# Patient Record
Sex: MALE | Race: WHITE | NOT HISPANIC OR LATINO | Employment: UNEMPLOYED | URBAN - NONMETROPOLITAN AREA
[De-identification: names, ages, dates, MRNs, and addresses within clinical notes are randomized per-mention and may not be internally consistent; named-entity substitution may affect disease eponyms.]

---

## 2017-03-11 ENCOUNTER — APPOINTMENT (OUTPATIENT)
Dept: GENERAL RADIOLOGY | Facility: HOSPITAL | Age: 62
End: 2017-03-11

## 2017-03-11 ENCOUNTER — HOSPITAL ENCOUNTER (INPATIENT)
Facility: HOSPITAL | Age: 62
LOS: 1 days | Discharge: ANOTHER HOSPITAL | End: 2017-03-15
Attending: EMERGENCY MEDICINE | Admitting: INTERNAL MEDICINE

## 2017-03-11 DIAGNOSIS — J44.1 DECOMPENSATED COPD WITH EXACERBATION (CHRONIC OBSTRUCTIVE PULMONARY DISEASE) (HCC): Primary | ICD-10-CM

## 2017-03-11 DIAGNOSIS — M54.50 CHRONIC LOW BACK PAIN WITHOUT SCIATICA, UNSPECIFIED BACK PAIN LATERALITY: ICD-10-CM

## 2017-03-11 DIAGNOSIS — J18.9 PNEUMONIA DUE TO INFECTIOUS ORGANISM, UNSPECIFIED LATERALITY, UNSPECIFIED PART OF LUNG: ICD-10-CM

## 2017-03-11 DIAGNOSIS — R13.13 DYSPHAGIA, PHARYNGEAL PHASE: ICD-10-CM

## 2017-03-11 DIAGNOSIS — R06.89 HYPERCAPNIA: ICD-10-CM

## 2017-03-11 DIAGNOSIS — G89.29 CHRONIC LOW BACK PAIN WITHOUT SCIATICA, UNSPECIFIED BACK PAIN LATERALITY: ICD-10-CM

## 2017-03-11 DIAGNOSIS — F17.200 SMOKER: ICD-10-CM

## 2017-03-11 DIAGNOSIS — Z74.09 IMPAIRED PHYSICAL MOBILITY: ICD-10-CM

## 2017-03-11 DIAGNOSIS — R09.02 HYPOXIA: ICD-10-CM

## 2017-03-11 PROBLEM — J96.21 ACUTE ON CHRONIC RESPIRATORY FAILURE WITH HYPOXIA AND HYPERCAPNIA (HCC): Chronic | Status: ACTIVE | Noted: 2017-03-11

## 2017-03-11 PROBLEM — J96.22 ACUTE ON CHRONIC RESPIRATORY FAILURE WITH HYPOXIA AND HYPERCAPNIA (HCC): Chronic | Status: ACTIVE | Noted: 2017-03-11

## 2017-03-11 LAB
ALBUMIN SERPL-MCNC: 3 G/DL (ref 3.4–4.8)
ALBUMIN/GLOB SERPL: 0.8 G/DL (ref 1.1–1.8)
ALP SERPL-CCNC: 115 U/L (ref 38–126)
ALT SERPL W P-5'-P-CCNC: 35 U/L (ref 21–72)
ANION GAP SERPL CALCULATED.3IONS-SCNC: 8 MMOL/L (ref 5–15)
ARTERIAL PATENCY WRIST A: ABNORMAL
AST SERPL-CCNC: 31 U/L (ref 17–59)
ATMOSPHERIC PRESS: ABNORMAL MMHG
BASE EXCESS BLDA CALC-SCNC: 7.8 MMOL/L (ref -2.4–2.4)
BASOPHILS # BLD AUTO: 0.01 10*3/MM3 (ref 0–0.2)
BASOPHILS NFR BLD AUTO: 0.1 % (ref 0–2)
BDY SITE: ABNORMAL
BILIRUB SERPL-MCNC: 0.3 MG/DL (ref 0.2–1.3)
BUN BLD-MCNC: 19 MG/DL (ref 7–21)
BUN/CREAT SERPL: 30.6 (ref 7–25)
CA-I BLD-MCNC: 4.5 MG/DL (ref 4.5–4.9)
CALCIUM SPEC-SCNC: 8.2 MG/DL (ref 8.4–10.2)
CHLORIDE SERPL-SCNC: 99 MMOL/L (ref 95–110)
CK MB SERPL-CCNC: 0.32 NG/ML (ref 0–5)
CK SERPL-CCNC: <20 U/L (ref 55–170)
CO2 BLDA-SCNC: 36.5 MMOL/L (ref 23–27)
CO2 SERPL-SCNC: 33 MMOL/L (ref 22–31)
CREAT BLD-MCNC: 0.62 MG/DL (ref 0.7–1.3)
D-LACTATE SERPL-SCNC: 1.5 MMOL/L (ref 0.5–2)
DEPRECATED RDW RBC AUTO: 44.3 FL (ref 35.1–43.9)
EOSINOPHIL # BLD AUTO: 0.07 10*3/MM3 (ref 0–0.7)
EOSINOPHIL NFR BLD AUTO: 0.7 % (ref 0–7)
ERYTHROCYTE [DISTWIDTH] IN BLOOD BY AUTOMATED COUNT: 13.3 % (ref 11.5–14.5)
GFR SERPL CREATININE-BSD FRML MDRD: 133 ML/MIN/1.73 (ref 56–130)
GLOBULIN UR ELPH-MCNC: 3.6 GM/DL (ref 2.3–3.5)
GLUCOSE BLD-MCNC: 128 MG/DL (ref 60–100)
GLUCOSE BLDA-MCNC: 144 MMOL/L
HCO3 BLDA-SCNC: 34.7 MMOL/L (ref 22–26)
HCT VFR BLD AUTO: 35.2 % (ref 39–49)
HCT VFR BLD CALC: 37 % (ref 40–54)
HGB BLD-MCNC: 12.1 G/DL (ref 13.7–17.3)
HGB BLDA-MCNC: 12.7 G/DL (ref 14–18)
HOLD SPECIMEN: NORMAL
HOLD SPECIMEN: NORMAL
IMM GRANULOCYTES # BLD: 0.06 10*3/MM3 (ref 0–0.02)
IMM GRANULOCYTES NFR BLD: 0.6 % (ref 0–0.5)
INR PPP: 1.05 (ref 0.8–1.2)
LYMPHOCYTES # BLD AUTO: 1.75 10*3/MM3 (ref 0.6–4.2)
LYMPHOCYTES NFR BLD AUTO: 18.2 % (ref 10–50)
MCH RBC QN AUTO: 30.7 PG (ref 26.5–34)
MCHC RBC AUTO-ENTMCNC: 34.4 G/DL (ref 31.5–36.3)
MCV RBC AUTO: 89.3 FL (ref 80–98)
MODALITY: ABNORMAL
MONOCYTES # BLD AUTO: 0.88 10*3/MM3 (ref 0–0.9)
MONOCYTES NFR BLD AUTO: 9.2 % (ref 0–12)
NEUTROPHILS # BLD AUTO: 6.84 10*3/MM3 (ref 2–8.6)
NEUTROPHILS NFR BLD AUTO: 71.2 % (ref 37–80)
NT-PROBNP SERPL-MCNC: 249 PG/ML (ref 0–900)
PCO2 BLDA: 59 MM HG (ref 35–45)
PH BLDA: 7.39 PH UNITS (ref 7.35–7.45)
PLATELET # BLD AUTO: 345 10*3/MM3 (ref 150–450)
PMV BLD AUTO: 9 FL (ref 8–12)
PO2 BLDA: 67.8 MM HG (ref 80–105)
POTASSIUM BLD-SCNC: 4.6 MMOL/L (ref 3.5–5.1)
POTASSIUM BLDA-SCNC: 3.92 MMOL/L (ref 3.6–4.9)
PROT SERPL-MCNC: 6.6 G/DL (ref 6.3–8.6)
PROTHROMBIN TIME: 13.7 SECONDS (ref 11.1–15.3)
RBC # BLD AUTO: 3.94 10*6/MM3 (ref 4.37–5.74)
SAO2 % BLDCOA: 93.2 %
SODIUM BLD-SCNC: 140 MMOL/L (ref 137–145)
SODIUM BLDA-SCNC: 138 MMOL/L (ref 138–146)
TROPONIN I SERPL-MCNC: <0.012 NG/ML
WBC NRBC COR # BLD: 9.61 10*3/MM3 (ref 3.2–9.8)
WHOLE BLOOD HOLD SPECIMEN: NORMAL
WHOLE BLOOD HOLD SPECIMEN: NORMAL

## 2017-03-11 PROCEDURE — 93010 ELECTROCARDIOGRAM REPORT: CPT | Performed by: INTERNAL MEDICINE

## 2017-03-11 PROCEDURE — 83605 ASSAY OF LACTIC ACID: CPT | Performed by: EMERGENCY MEDICINE

## 2017-03-11 PROCEDURE — 25010000002 CEFTRIAXONE: Performed by: EMERGENCY MEDICINE

## 2017-03-11 PROCEDURE — 83880 ASSAY OF NATRIURETIC PEPTIDE: CPT | Performed by: EMERGENCY MEDICINE

## 2017-03-11 PROCEDURE — 71010 HC CHEST PA OR AP: CPT

## 2017-03-11 PROCEDURE — G0378 HOSPITAL OBSERVATION PER HR: HCPCS

## 2017-03-11 PROCEDURE — 82550 ASSAY OF CK (CPK): CPT | Performed by: EMERGENCY MEDICINE

## 2017-03-11 PROCEDURE — 85025 COMPLETE CBC W/AUTO DIFF WBC: CPT | Performed by: EMERGENCY MEDICINE

## 2017-03-11 PROCEDURE — 25010000002 METHYLPREDNISOLONE PER 125 MG: Performed by: INTERNAL MEDICINE

## 2017-03-11 PROCEDURE — 36600 WITHDRAWAL OF ARTERIAL BLOOD: CPT

## 2017-03-11 PROCEDURE — 93005 ELECTROCARDIOGRAM TRACING: CPT

## 2017-03-11 PROCEDURE — 82553 CREATINE MB FRACTION: CPT | Performed by: EMERGENCY MEDICINE

## 2017-03-11 PROCEDURE — 84484 ASSAY OF TROPONIN QUANT: CPT | Performed by: EMERGENCY MEDICINE

## 2017-03-11 PROCEDURE — 93005 ELECTROCARDIOGRAM TRACING: CPT | Performed by: EMERGENCY MEDICINE

## 2017-03-11 PROCEDURE — 82803 BLOOD GASES ANY COMBINATION: CPT | Performed by: EMERGENCY MEDICINE

## 2017-03-11 PROCEDURE — 99284 EMERGENCY DEPT VISIT MOD MDM: CPT

## 2017-03-11 PROCEDURE — 85610 PROTHROMBIN TIME: CPT | Performed by: EMERGENCY MEDICINE

## 2017-03-11 PROCEDURE — 80053 COMPREHEN METABOLIC PANEL: CPT | Performed by: EMERGENCY MEDICINE

## 2017-03-11 RX ORDER — ALBUTEROL SULFATE 2.5 MG/3ML
2.5 SOLUTION RESPIRATORY (INHALATION)
Status: DISCONTINUED | OUTPATIENT
Start: 2017-03-12 | End: 2017-03-15 | Stop reason: HOSPADM

## 2017-03-11 RX ORDER — GABAPENTIN 100 MG/1
200 CAPSULE ORAL EVERY 8 HOURS SCHEDULED
Status: DISCONTINUED | OUTPATIENT
Start: 2017-03-12 | End: 2017-03-15 | Stop reason: HOSPADM

## 2017-03-11 RX ORDER — LORAZEPAM 1 MG/1
1 TABLET ORAL EVERY 6 HOURS PRN
Status: DISCONTINUED | OUTPATIENT
Start: 2017-03-11 | End: 2017-03-15 | Stop reason: HOSPADM

## 2017-03-11 RX ORDER — AZITHROMYCIN 250 MG/1
500 TABLET, FILM COATED ORAL ONCE
Status: COMPLETED | OUTPATIENT
Start: 2017-03-12 | End: 2017-03-12

## 2017-03-11 RX ORDER — OXYCODONE AND ACETAMINOPHEN 10; 325 MG/1; MG/1
1 TABLET ORAL EVERY 4 HOURS PRN
Status: DISCONTINUED | OUTPATIENT
Start: 2017-03-11 | End: 2017-03-15 | Stop reason: HOSPADM

## 2017-03-11 RX ORDER — IBUPROFEN 600 MG/1
600 TABLET ORAL ONCE
Status: COMPLETED | OUTPATIENT
Start: 2017-03-11 | End: 2017-03-11

## 2017-03-11 RX ORDER — TRAZODONE HYDROCHLORIDE 50 MG/1
50 TABLET ORAL NIGHTLY
Status: DISCONTINUED | OUTPATIENT
Start: 2017-03-12 | End: 2017-03-15 | Stop reason: HOSPADM

## 2017-03-11 RX ORDER — BUDESONIDE AND FORMOTEROL FUMARATE DIHYDRATE 160; 4.5 UG/1; UG/1
2 AEROSOL RESPIRATORY (INHALATION)
Status: DISCONTINUED | OUTPATIENT
Start: 2017-03-11 | End: 2017-03-15 | Stop reason: HOSPADM

## 2017-03-11 RX ORDER — TRAMADOL HYDROCHLORIDE 50 MG/1
50 TABLET ORAL 2 TIMES DAILY
COMMUNITY
End: 2017-03-15 | Stop reason: HOSPADM

## 2017-03-11 RX ORDER — SODIUM CHLORIDE 0.9 % (FLUSH) 0.9 %
1-10 SYRINGE (ML) INJECTION AS NEEDED
Status: DISCONTINUED | OUTPATIENT
Start: 2017-03-11 | End: 2017-03-15 | Stop reason: HOSPADM

## 2017-03-11 RX ORDER — ASPIRIN 81 MG/1
81 TABLET, CHEWABLE ORAL DAILY
Status: DISCONTINUED | OUTPATIENT
Start: 2017-03-12 | End: 2017-03-15 | Stop reason: HOSPADM

## 2017-03-11 RX ORDER — METHYLPREDNISOLONE SODIUM SUCCINATE 125 MG/2ML
60 INJECTION, POWDER, LYOPHILIZED, FOR SOLUTION INTRAMUSCULAR; INTRAVENOUS EVERY 6 HOURS
Status: DISCONTINUED | OUTPATIENT
Start: 2017-03-11 | End: 2017-03-15 | Stop reason: HOSPADM

## 2017-03-11 RX ORDER — AZITHROMYCIN 250 MG/1
500 TABLET, FILM COATED ORAL ONCE
Status: COMPLETED | OUTPATIENT
Start: 2017-03-11 | End: 2017-03-11

## 2017-03-11 RX ORDER — SODIUM CHLORIDE 0.9 % (FLUSH) 0.9 %
10 SYRINGE (ML) INJECTION AS NEEDED
Status: DISCONTINUED | OUTPATIENT
Start: 2017-03-11 | End: 2017-03-15 | Stop reason: HOSPADM

## 2017-03-11 RX ORDER — IBUPROFEN 400 MG/1
400 TABLET ORAL EVERY 6 HOURS PRN
Status: DISCONTINUED | OUTPATIENT
Start: 2017-03-11 | End: 2017-03-15 | Stop reason: HOSPADM

## 2017-03-11 RX ADMIN — IBUPROFEN 600 MG: 600 TABLET ORAL at 20:41

## 2017-03-11 RX ADMIN — CEFTRIAXONE 1 G: 1 INJECTION, POWDER, FOR SOLUTION INTRAMUSCULAR; INTRAVENOUS at 20:50

## 2017-03-11 RX ADMIN — AZITHROMYCIN 500 MG: 250 TABLET, FILM COATED ORAL at 20:50

## 2017-03-11 RX ADMIN — Medication 10 ML: at 23:48

## 2017-03-11 RX ADMIN — OXYCODONE HYDROCHLORIDE AND ACETAMINOPHEN 1 TABLET: 10; 325 TABLET ORAL at 23:52

## 2017-03-11 RX ADMIN — METHYLPREDNISOLONE SODIUM SUCCINATE 60 MG: 125 INJECTION, POWDER, FOR SOLUTION INTRAMUSCULAR; INTRAVENOUS at 23:42

## 2017-03-12 PROCEDURE — 94640 AIRWAY INHALATION TREATMENT: CPT

## 2017-03-12 PROCEDURE — 25010000002 METHYLPREDNISOLONE PER 125 MG: Performed by: INTERNAL MEDICINE

## 2017-03-12 PROCEDURE — 25010000002 CEFTRIAXONE: Performed by: INTERNAL MEDICINE

## 2017-03-12 PROCEDURE — 92610 EVALUATE SWALLOWING FUNCTION: CPT | Performed by: SPEECH-LANGUAGE PATHOLOGIST

## 2017-03-12 PROCEDURE — 94760 N-INVAS EAR/PLS OXIMETRY 1: CPT

## 2017-03-12 PROCEDURE — G8997 SWALLOW GOAL STATUS: HCPCS | Performed by: SPEECH-LANGUAGE PATHOLOGIST

## 2017-03-12 PROCEDURE — 94799 UNLISTED PULMONARY SVC/PX: CPT

## 2017-03-12 PROCEDURE — G0378 HOSPITAL OBSERVATION PER HR: HCPCS

## 2017-03-12 PROCEDURE — G8996 SWALLOW CURRENT STATUS: HCPCS | Performed by: SPEECH-LANGUAGE PATHOLOGIST

## 2017-03-12 RX ADMIN — BUDESONIDE AND FORMOTEROL FUMARATE DIHYDRATE 2 PUFF: 160; 4.5 AEROSOL RESPIRATORY (INHALATION) at 08:05

## 2017-03-12 RX ADMIN — METHYLPREDNISOLONE SODIUM SUCCINATE 60 MG: 125 INJECTION, POWDER, FOR SOLUTION INTRAMUSCULAR; INTRAVENOUS at 05:12

## 2017-03-12 RX ADMIN — METHYLPREDNISOLONE SODIUM SUCCINATE 60 MG: 125 INJECTION, POWDER, FOR SOLUTION INTRAMUSCULAR; INTRAVENOUS at 10:44

## 2017-03-12 RX ADMIN — ALBUTEROL SULFATE 2.5 MG: 2.5 SOLUTION RESPIRATORY (INHALATION) at 18:56

## 2017-03-12 RX ADMIN — GABAPENTIN 200 MG: 100 CAPSULE ORAL at 21:34

## 2017-03-12 RX ADMIN — BUDESONIDE AND FORMOTEROL FUMARATE DIHYDRATE 2 PUFF: 160; 4.5 AEROSOL RESPIRATORY (INHALATION) at 18:56

## 2017-03-12 RX ADMIN — OXYCODONE HYDROCHLORIDE AND ACETAMINOPHEN 1 TABLET: 10; 325 TABLET ORAL at 17:29

## 2017-03-12 RX ADMIN — GABAPENTIN 200 MG: 100 CAPSULE ORAL at 06:40

## 2017-03-12 RX ADMIN — ALBUTEROL SULFATE 2.5 MG: 2.5 SOLUTION RESPIRATORY (INHALATION) at 12:24

## 2017-03-12 RX ADMIN — BUDESONIDE AND FORMOTEROL FUMARATE DIHYDRATE 2 PUFF: 160; 4.5 AEROSOL RESPIRATORY (INHALATION) at 00:02

## 2017-03-12 RX ADMIN — CEFTRIAXONE 1 G: 1 INJECTION, POWDER, FOR SOLUTION INTRAMUSCULAR; INTRAVENOUS at 21:34

## 2017-03-12 RX ADMIN — OXYCODONE HYDROCHLORIDE AND ACETAMINOPHEN 1 TABLET: 10; 325 TABLET ORAL at 05:13

## 2017-03-12 RX ADMIN — ALBUTEROL SULFATE 2.5 MG: 2.5 SOLUTION RESPIRATORY (INHALATION) at 00:02

## 2017-03-12 RX ADMIN — AZITHROMYCIN 500 MG: 250 TABLET, FILM COATED ORAL at 06:40

## 2017-03-12 RX ADMIN — OXYCODONE HYDROCHLORIDE AND ACETAMINOPHEN 1 TABLET: 10; 325 TABLET ORAL at 09:16

## 2017-03-12 RX ADMIN — OXYCODONE HYDROCHLORIDE AND ACETAMINOPHEN 1 TABLET: 10; 325 TABLET ORAL at 21:35

## 2017-03-12 RX ADMIN — METHYLPREDNISOLONE SODIUM SUCCINATE 60 MG: 125 INJECTION, POWDER, FOR SOLUTION INTRAMUSCULAR; INTRAVENOUS at 22:50

## 2017-03-12 RX ADMIN — OXYCODONE HYDROCHLORIDE AND ACETAMINOPHEN 1 TABLET: 10; 325 TABLET ORAL at 13:30

## 2017-03-12 RX ADMIN — ALBUTEROL SULFATE 2.5 MG: 2.5 SOLUTION RESPIRATORY (INHALATION) at 08:06

## 2017-03-12 RX ADMIN — METHYLPREDNISOLONE SODIUM SUCCINATE 60 MG: 125 INJECTION, POWDER, FOR SOLUTION INTRAMUSCULAR; INTRAVENOUS at 17:30

## 2017-03-12 RX ADMIN — GABAPENTIN 200 MG: 100 CAPSULE ORAL at 13:30

## 2017-03-12 RX ADMIN — Medication 10 ML: at 10:44

## 2017-03-12 RX ADMIN — ASPIRIN 81 MG 81 MG: 81 TABLET ORAL at 09:16

## 2017-03-12 NOTE — PLAN OF CARE
Problem: Fall Risk (Adult)  Goal: Identify Related Risk Factors and Signs and Symptoms  Outcome: Outcome(s) achieved Date Met:  03/12/17

## 2017-03-12 NOTE — CONSULTS
"Adult Nutrition  Assessment    Patient Name:  Héctor Koo  YOB: 1955  MRN: [id number]  Admit Date:  3/11/2017    Assessment Date:  3/12/2017          Reason for Assessment       03/12/17 1619    Reason for Assessment    Reason For Assessment/Visit admission assessment;identified at risk by screening criteria    Identified At Risk By Screening Criteria MST SCORE 2+;BMI                Nutrition/Diet History       03/12/17 1645    Nutrition/Diet History    Typical Food/Fluid Intake Lives alone. Cooks/shops for self (when feels like it). Tired of snack foods.     Supplemental Drinks/Foods/Additives Agreed to Ensure Enlive with meals.              Labs/Tests/Procedures/Meds       03/12/17 1648    Labs/Tests/Procedures/Meds    Diagnostic Test/Procedure Review reviewed, pertinent    Significant Vitals reviewed, pertinent            Physical Findings       03/12/17 1648    Physical Findings/Assessment    Additional Documentation Physical Appearance (Group)    Physical Appearance    Overall Physical Appearance generalized wasting;loss of muscle mass    Oral/Mouth Cavity --   Pt does not appear to have teeth, but states can chew regular texture.            Estimated/Assessed Needs       03/12/17 1650    Calculation Measurements    Weight Used For Calculations 47.6 kg (105 lb)    Height Used for Calculations 1.626 m (5' 4\")    Estimated/Assessed Energy Needs    Energy Need Method [place] Je[place]    Age 61    RMR ([place] Jeor Equation) 1202.28    Activity Factors ([place] Je[place])  Out of bed, ambulatory  1.3    Total estimated needs ([place] Jeor) 1562 for weight main    Estimated Kcal Range  7403-6323 for weight gain    Estimated/Assessed Protein Needs    Weight Used for Protein Calculation 59 kg (130 lb)   used IBW for calculation    Protein (gm/kg) 1.3    1.3 Gm Protein (gm) 76.66    Estimated Protein Range 75-85            Nutrition Prescription Ordered       03/12/17 1653    Nutrition " Prescription PO    Current PO Diet Regular    Supplement Ensure Enlive    Supplement Frequency 3 times a day              Comments:  61WM admitted due to exacerbation COPD/pneu/dysphagia (says is not a problem now). Hx: sciatica (lower back).  Significant unintentional wt loss 25-30 lb. Pt lives alone. Shops and cooks for self (when able). Tired of snack foods.  Pt agreed to Ensure Enlive with meals (raeann) while in hospital. Discussed options for meals/supplements at home.  Income is limited.         Electronically signed by:  Laurie Rosas RD  03/12/17 4:59 PM

## 2017-03-12 NOTE — PLAN OF CARE
Problem: Skin Integrity Impairment, Risk/Actual (Adult)  Goal: Identify Related Risk Factors and Signs and Symptoms  Outcome: Outcome(s) achieved Date Met:  03/12/17

## 2017-03-12 NOTE — PROGRESS NOTES
[facility] Medicine [facility]  INPATIENT PROGRESS NOTE    Length of Stay: 0  Date of Admission: 3/11/2017  Primary Care Physician: Cory Correia MD    Subjective   Subjective    Feels SOA.  Breathing is slowly improving .     Review of Systems   Constitutional: Negative for activity change.   Respiratory: Positive for cough and shortness of breath. Negative for chest tightness.    Gastrointestinal: Negative for diarrhea.        All pertinent negatives and positives are as above. All other systems have been reviewed and are negative unless otherwise stated.     Objective    Temp:  [97.2 °F (36.2 °C)-97.8 °F (36.6 °C)] 97.2 °F (36.2 °C)  Heart Rate:  [74-96] 78  Resp:  [18-25] 18  BP: (118-148)/(59-81) 147/71  Physical Exam   Constitutional: No distress.   HENT:   Head: Normocephalic and atraumatic.   Pulmonary/Chest: Effort normal. He has rales.   Abdominal: Soft. He exhibits no distension. There is no tenderness.   Musculoskeletal: Normal range of motion.   Neurological: He is alert.   Skin: Skin is warm and dry. He is not diaphoretic.   Psychiatric: He has a normal mood and affect. His behavior is normal. Thought content normal.           Results Review:  I have reviewed the labs, radiology results, and diagnostic studies.    Laboratory Data:   Lab Results (last 24 hours)     Procedure Component Value Units Date/Time    Blood Gas, Arterial [[id number]]  (Abnormal) Collected:  03/11/17 1845    Specimen:  Arterial Blood Updated:  03/11/17 1853     Site --       Not performed at this site.        Paul's Test --       Not performed at this site.        pH, Arterial 7.387 pH units      pCO2, Arterial 59.0 (H) mm Hg      pO2, Arterial 67.8 (L) mm Hg      HCO3, Arterial 34.7 (H) mmol/L      Base Excess, Arterial 7.8 (H) mmol/L      O2 Saturation, Arterial 93.2 %      Hemoglobin, Blood Gas 12.7 (L) g/dL      Hematocrit, Blood Gas 37.0 (L) %      CO2 Content 36.5 (H)       Sodium, Arterial 138.0 mmol/L      Potassium, Arterial 3.92 mmol/L      Glucose, Arterial 144 mmol/L      Barometric Pressure for Blood Gas -- mmHg       Not performed at this site.        Modality --       Not performed at this site.        Ionized Calcium 4.5 mg/dL     CBC & Differential [[id number]] Collected:  03/11/17 1845    Specimen:  Blood Updated:  03/11/17 1854    Narrative:       The following orders were created for panel order CBC & Differential.  Procedure                               Abnormality         Status                     ---------                               -----------         ------                     CBC Auto Differential[[id number]]         Abnormal            Final result                 Please view results for these tests on the individual orders.    CBC Auto Differential [[id number]]  (Abnormal) Collected:  03/11/17 1845    Specimen:  Blood Updated:  03/11/17 1854     WBC 9.61 10*3/mm3      RBC 3.94 (L) 10*6/mm3      Hemoglobin 12.1 (L) g/dL      Hematocrit 35.2 (L) %      MCV 89.3 fL      MCH 30.7 pg      MCHC 34.4 g/dL      RDW 13.3 %      RDW-SD 44.3 (H) fl      MPV 9.0 fL      Platelets 345 10*3/mm3      Neutrophil % 71.2 %      Lymphocyte % 18.2 %      Monocyte % 9.2 %      Eosinophil % 0.7 %      Basophil % 0.1 %      Immature Grans % 0.6 (H) %      Neutrophils, Absolute 6.84 10*3/mm3      Lymphocytes, Absolute 1.75 10*3/mm3      Monocytes, Absolute 0.88 10*3/mm3      Eosinophils, Absolute 0.07 10*3/mm3      Basophils, Absolute 0.01 10*3/mm3      Immature Grans, Absolute 0.06 (H) 10*3/mm3     Lactic Acid, Plasma [[id number]]  (Normal) Collected:  03/11/17 1845    Specimen:  Blood Updated:  03/11/17 1902     Lactate 1.5 mmol/L     Comprehensive Metabolic Panel [[id number]]  (Abnormal) Collected:  03/11/17 1845    Specimen:  Blood Updated:  03/11/17 1903     Glucose 128 (H) mg/dL      BUN 19 mg/dL      Creatinine 0.62 (L) mg/dL      Sodium 140 mmol/L      Potassium 4.6 mmol/L       Chloride 99 mmol/L      CO2 33.0 (H) mmol/L      Calcium 8.2 (L) mg/dL      Total Protein 6.6 g/dL      Albumin 3.00 (L) g/dL      ALT (SGPT) 35 U/L      AST (SGOT) 31 U/L      Alkaline Phosphatase 115 U/L      Total Bilirubin 0.3 mg/dL      eGFR Non African Amer 133 (H) mL/min/1.73      Globulin 3.6 (H) gm/dL      A/G Ratio 0.8 (L) g/dL      BUN/Creatinine Ratio 30.6 (H)      Anion Gap 8.0 mmol/L     CK [[id number]]  (Abnormal) Collected:  03/11/17 1845    Specimen:  Blood Updated:  03/11/17 1950     Creatine Kinase <20 (L) U/L     Troponin [[id number]]  (Normal) Collected:  03/11/17 1845    Specimen:  Blood Updated:  03/11/17 2003     Troponin I <0.012 ng/mL     CK-MB [[id number]]  (Normal) Collected:  03/11/17 1845    Specimen:  Blood Updated:  03/11/17 2003     CKMB 0.32 ng/mL     BNP [[id number]]  (Normal) Collected:  03/11/17 1845    Specimen:  Blood Updated:  03/11/17 2003     proBNP 249.0 pg/mL     Protime-INR [[id number]]  (Normal) Collected:  03/11/17 1845    Specimen:  Blood Updated:  03/11/17 2009     Protime 13.7 Seconds      INR 1.05     Narrative:       Therapeutic range for most indications is 2.0-3.0 INR,  or 2.5-3.5 for mechanical heart valves.    [place] Draw [[id number]] Collected:  03/11/17 1845    Specimen:  Blood Updated:  03/11/17 2301    Narrative:       The following orders were created for panel order [place] Draw.  Procedure                               Abnormality         Status                     ---------                               -----------         ------                     Light Blue Top[[id number]]                                    Final result               Green Top (Gel)[[id number]]                                   Final result               Lavender Top[[id number]]                                      Final result               Gold Top - SST[[id number]]                                    Final result                 Please view results for these tests on the individual orders.    Light  Blue Top [[id number]] Collected:  03/11/17 1845    Specimen:  Blood Updated:  03/11/17 2301     Extra Tube hold for add-on       Auto resulted       Green Top (Gel) [[id number]] Collected:  03/11/17 1845    Specimen:  Blood Updated:  03/11/17 2301     Extra Tube Hold for add-ons.       Auto resulted.       Lavender Top [[id number]] Collected:  03/11/17 1845    Specimen:  Blood Updated:  03/11/17 2301     Extra Tube hold for add-on       Auto resulted       Gold Top - SST [[id number]] Collected:  03/11/17 1845    Specimen:  Blood Updated:  03/11/17 2301     Extra Tube Hold for add-ons.       Auto resulted.             Culture Data:   No results found for: BLOODCX  No results found for: URINECX  No results found for: RESPCX  No results found for: WOUNDCX  No results found for: STOOLCX  No components found for: BODYFLD    Radiology Data:   Imaging Results (last 24 hours)     Procedure Component Value Units Date/Time    XR Chest 1 View [[id number]] Collected:  03/11/17 1950     Updated:  03/11/17 1955    Narrative:       Patient Name:  ORAL FRYE  Patient ID:  [id number]   Ordering:  SHY SMITH  Attending:  SHY SMITH  Referring:  SHY SMITH  ------------------------------------------------  PROCEDURE: XR CHEST 1 VIEW    INDICATION FOR PROCEDURE:  61 years -old patient presents for  evaluation of midsternal chest pain    COMPARISON:  December 12, 2015.    FINDINGS: XR CHEST 1 view  reveals the lungs are expanded. There  is heterogeneous airspace consolidation and atelectasis. There is  left apical pleural thickening, similar to previous study. There  may also be related apical pleural thickening. There is blunting  of left lateral costophrenic angle suggesting a small pleural  effusion or pleural thickening.    There is no radiographic evidence for pneumothorax. Mediastinal  and cardiac silhouettes are within normal limits.     The bones are osteopenic. The skeletal structures are within  normal limits.      A large amount of bowel gas is visualized. A nonspecific lucency  is visible in the right upper quadrant that may represent bowel  gas.      Impression:         1.  Probable bilateral airspace disease. This likely represents  pneumonia.   2.  Patient has extensive sequela of pleural thickening and  pulmonary fibrosis.    Electronically signed by:  Jacquelyn Melendrez MD  3/11/2017 7:54 PM CST  Workstation: [employer]          I have reviewed the patient current medications.     Assessment/Plan     Hospital Problem List     * (Principal)Acute on chronic respiratory failure with hypoxia and hypercapnia (Chronic)    Decompensated COPD with exacerbation (chronic obstructive pulmonary disease)    Chronic pain (Chronic)    Pneumonia          Plan:    COPD exacerbation - solumedrol  PNA - rocephin/azithromycin  Deconditioning - PTOT              Discharge Planning: I expect patient to be discharged to home in 1-2 days.    Chris Marie MD   03/12/17   3:43 PM

## 2017-03-12 NOTE — PLAN OF CARE
Problem: Patient Care Overview (Adult)  Goal: Plan of Care Review  Outcome: Ongoing (interventions implemented as appropriate)    03/12/17 0907   Coping/Psychosocial Response Interventions   Plan Of Care Reviewed With patient   Outcome Evaluation   Outcome Summary/Follow up Plan Evaluation completed; 1-2 follow up sessions for diet tolerance.          Problem: Inpatient SLP  Goal: Dysphagia- Patient will safely consume diet as per recommendation with no signs/symptoms of aspiration  Outcome: Ongoing (interventions implemented as appropriate)    03/12/17 0907   Safely Consume Diet   Safely Consume Diet- SLP, Date Established 03/12/17   Safely Consume Diet- SLP, Time to Achieve 2 - 3 days   Safely Consume Diet- SLP, Additional Goal Pt to tolerate least restrictive diet with no s/s of aspiration for adequate nutrition/hydration.

## 2017-03-12 NOTE — PROGRESS NOTES
Acute Care - Speech Language Pathology Initial Evaluation  [facility]     Patient Name: Héctor Koo  : 1955  MRN: [id number]  Today's Date: 3/12/2017               Admit Date: 3/11/2017    Evaluation completed; 1-2 follow up sessions needed to ensure swallow safety and efficiency. Pt with mild cough with straw however when straw removed no s/s of aspiration noted.        Visit Dx:    ICD-10-CM ICD-9-CM   1. Decompensated COPD with exacerbation (chronic obstructive pulmonary disease) J44.1 491.21   2. Pneumonia due to infectious organism, unspecified laterality, unspecified part of lung J18.9 136.9     484.8   3. Hypoxia R09.02 799.02   4. Hypercapnia R06.89 786.09   5. Chronic low back pain without sciatica, unspecified back pain laterality M54.5 724.2    G89.29 338.29   6. Smoker F17.200 305.1   7. Dysphagia, pharyngeal phase R13.13 787.23     Patient Active Problem List   Diagnosis   • Decompensated COPD with exacerbation (chronic obstructive pulmonary disease)   • Chronic pain   • Acute on chronic respiratory failure with hypoxia and hypercapnia   • Pneumonia     Past Medical History   Diagnosis Date   • Chronic back pain    • Chronic hepatitis      B and C   • Chronic obstructive lung disease    • Chronic pain    • Drug dependence    • Nondependent alcohol abuse, in remission    • Pneumonia      Past Surgical History   Procedure Laterality Date   • Diaphragmatic hernia repair     • Hip surgery  2015   • Liver biopsy     • Joint replacement              EDUCATION  The patient has been educated in the following areas:   Dysphagia (Swallowing Impairment).    SLP Recommendation and Plan              Anticipated Discharge Disposition: home with home health                Plan of Care Review  Plan Of Care Reviewed With: patient  Outcome Summary/Follow up Plan: Evaluation completed; 1-2 follow up sessions for diet tolerance.           IP SLP Goals       17 0907          Safely Consume  Diet    Safely Consume Diet- SLP, Date Established 03/12/17  -EK      Safely Consume Diet- SLP, Time to Achieve 2 - 3 days  -EK      Safely Consume Diet- SLP, Additional Goal Pt to tolerate least restrictive diet with no s/s of aspiration for adequate nutrition/hydration.   -EK        User Key  (r) = Recorded By, (t) = Taken By, (c) = Cosigned By    Initials Name Provider Type    SHAHZAD Jung Speech and Language Pathologist              Time Calculation:         Time Calculation- SLP       03/12/17 0909          Time Calculation- SLP    SLP Start Time 0840  -EK      SLP Stop Time 0910  -EK      SLP Time Calculation (min) 30 min  -EK      SLP Received On 03/12/17  -EK      SLP Goal Re-Cert Due Date 03/25/17  -EK        User Key  (r) = Recorded By, (t) = Taken By, (c) = Cosigned By    Initials Name Provider Type    SHAHZAD Jung Speech and Language Pathologist          Therapy Charges for Today     Code Description Service Date Service Provider Modifiers Qty    74723670813 HC ST SWALLOWING CURRENT STATUS 3/12/2017 SHAHZAD Haq GN, CI 1    72914966136 HC ST SWALLOWING PROJECTED 3/12/2017 SHAHZAD Haq GN, CI 1    53905005018 HC ST EVAL ORAL PHARYNG SWALLOW 2 3/12/2017 SHAHZAD Haq GN 1              SLP G-Codes  Functional Limitations: Swallowing  Swallow Current Status (): At least 1 percent but less than 20 percent impaired, limited or restricted  Swallow Goal Status (): At least 1 percent but less than 20 percent impaired, limited or restricted      SHAHZAD Haq  3/12/2017

## 2017-03-12 NOTE — PLAN OF CARE
Problem: Patient Care Overview (Adult)  Goal: Plan of Care Review  Outcome: Ongoing (interventions implemented as appropriate)    03/12/17 1629   Coping/Psychosocial Response Interventions   Plan Of Care Reviewed With patient   Patient Care Overview   Progress no change   Outcome Evaluation   Outcome Summary/Follow up Plan patient weak, appetite good, vitals stable       Goal: Adult Individualization and Mutuality  Outcome: Ongoing (interventions implemented as appropriate)  Goal: Discharge Needs Assessment  Outcome: Ongoing (interventions implemented as appropriate)    Problem: Pneumonia (Adult)  Goal: Signs and Symptoms of Listed Potential Problems Will be Absent or Manageable (Pneumonia)  Outcome: Ongoing (interventions implemented as appropriate)    Problem: Infection, Risk/Actual (Adult)  Goal: Infection Prevention/Resolution  Outcome: Ongoing (interventions implemented as appropriate)    Problem: Skin Integrity Impairment, Risk/Actual (Adult)  Goal: Skin Integrity/Wound Healing  Outcome: Ongoing (interventions implemented as appropriate)    Problem: Fall Risk (Adult)  Goal: Absence of Falls  Outcome: Ongoing (interventions implemented as appropriate)

## 2017-03-12 NOTE — PLAN OF CARE
Problem: Patient Care Overview (Adult)  Goal: Plan of Care Review  Outcome: Ongoing (interventions implemented as appropriate)    03/12/17 0443   Coping/Psychosocial Response Interventions   Plan Of Care Reviewed With patient   Patient Care Overview   Progress no change   Outcome Evaluation   Outcome Summary/Follow up Plan no falls; educated pt on importance of turning to prevent skin breakdown; pt afebrile;        Goal: Adult Individualization and Mutuality  Outcome: Ongoing (interventions implemented as appropriate)  Goal: Discharge Needs Assessment  Outcome: Ongoing (interventions implemented as appropriate)    Problem: Pneumonia (Adult)  Goal: Signs and Symptoms of Listed Potential Problems Will be Absent or Manageable (Pneumonia)  Outcome: Ongoing (interventions implemented as appropriate)    Problem: Infection, Risk/Actual (Adult)  Goal: Identify Related Risk Factors and Signs and Symptoms  Outcome: Outcome(s) achieved Date Met:  03/12/17  Goal: Infection Prevention/Resolution  Outcome: Ongoing (interventions implemented as appropriate)    Problem: Skin Integrity Impairment, Risk/Actual (Adult)  Goal: Skin Integrity/Wound Healing  Outcome: Ongoing (interventions implemented as appropriate)  Educated pt on importance of turning to prevent skin breakdown     Problem: Fall Risk (Adult)  Goal: Absence of Falls  Outcome: Ongoing (interventions implemented as appropriate)

## 2017-03-12 NOTE — H&P
History and Physical  Jaya Harrison MD  Hospitalist      Patient Care Team:  Cory Correia MD as PCP - General    Chief complaint   Chief Complaint   Patient presents with   • Shortness of Breath       Subjective     Patient is a 61 y.o. male admitted for dyspnea, cough, wheezing. His symptoms are chronic, became worse lately.      History  Past Medical History   Diagnosis Date   • Chronic back pain    • Chronic hepatitis      B and C   • Chronic obstructive lung disease    • Chronic pain    • Drug dependence    • Nondependent alcohol abuse, in remission    • Pneumonia      Past Surgical History   Procedure Laterality Date   • Diaphragmatic hernia repair     • Hip surgery  12/14/2015   • Liver biopsy       Family History   Problem Relation Age of Onset   • Diabetes Neg Hx    • Hypertension Neg Hx    • Coronary artery disease Neg Hx      Social History   Substance Use Topics   • Smoking status: Current Every Day Smoker     Packs/day: 0.50   • Smokeless tobacco: None   • Alcohol use No      Comment: quit 10 years ago, prev alcoholic       (Not in a hospital admission)  Allergies:  Review of patient's allergies indicates no known allergies.    Review of Systems  Review of Systems   Constitutional: Negative for chills and fever.   Respiratory: Positive for cough, shortness of breath and wheezing. Negative for stridor.    Cardiovascular: Negative for chest pain, palpitations and leg swelling.   Gastrointestinal: Negative for abdominal distention, constipation and diarrhea.   Genitourinary: Negative for dysuria, frequency and urgency.   Musculoskeletal: Positive for arthralgias, back pain and neck pain.   Neurological: Positive for weakness and light-headedness. Negative for numbness.   Psychiatric/Behavioral: Negative for agitation and behavioral problems.   All other systems reviewed and are negative.      Objective     Vital Signs  Temp:  [97.7 °F (36.5 °C)] 97.7 °F (36.5 °C)  Heart Rate:  [74-92] 74  Resp:   [20-25] 20  BP: (118-148)/(59-81) 136/81    Physical Exam:  Physical Exam   Constitutional: He is oriented to person, place, and time. He appears cachectic. He has a sickly appearance.   HENT:   Head: Normocephalic.   Eyes: EOM are normal.   Neck: Neck supple.   Cardiovascular: Normal rate and regular rhythm.    Pulmonary/Chest: He is in respiratory distress. He has wheezes. He has rales.   Abdominal: Soft. Bowel sounds are normal. He exhibits no distension and no mass. There is no tenderness. There is no guarding.   Musculoskeletal: Normal range of motion. He exhibits no edema.   Neurological: He is alert and oriented to person, place, and time. No cranial nerve deficit.   Skin: Skin is dry. No erythema.   Psychiatric: He has a normal mood and affect. His behavior is normal.   Vitals reviewed.      Results Review:   Lab Results (last 24 hours)     Procedure Component Value Units Date/Time    [place] Draw [[id number]] Collected:  03/11/17 1845    Specimen:  Blood Updated:  03/11/17 1850    Narrative:       The following orders were created for panel order [place] Draw.  Procedure                               Abnormality         Status                     ---------                               -----------         ------                     Light Blue Top[[id number]]                                    In process                 Green Top (Gel)[[id number]]                                   In process                 Lavender Top[[id number]]                                      In process                 Gold Top - SST[[id number]]                                    In process                   Please view results for these tests on the individual orders.    Light Blue Top [[id number]] Collected:  03/11/17 1845    Specimen:  Blood Updated:  03/11/17 1850    Green Top (Gel) [[id number]] Collected:  03/11/17 1845    Specimen:  Blood Updated:  03/11/17 1850    Gold Top - SST [[id number]] Collected:  03/11/17 1845    Specimen:  Blood  Updated:  03/11/17 1850    Lavender Top [[id number]] Collected:  03/11/17 1845    Specimen:  Blood Updated:  03/11/17 1850    Blood Gas, Arterial [[id number]]  (Abnormal) Collected:  03/11/17 1845    Specimen:  Arterial Blood Updated:  03/11/17 1853     Site --       Not performed at this site.        Paul's Test --       Not performed at this site.        pH, Arterial 7.387 pH units      pCO2, Arterial 59.0 (H) mm Hg      pO2, Arterial 67.8 (L) mm Hg      HCO3, Arterial 34.7 (H) mmol/L      Base Excess, Arterial 7.8 (H) mmol/L      O2 Saturation, Arterial 93.2 %      Hemoglobin, Blood Gas 12.7 (L) g/dL      Hematocrit, Blood Gas 37.0 (L) %      CO2 Content 36.5 (H)      Sodium, Arterial 138.0 mmol/L      Potassium, Arterial 3.92 mmol/L      Glucose, Arterial 144 mmol/L      Barometric Pressure for Blood Gas -- mmHg       Not performed at this site.        Modality --       Not performed at this site.        Ionized Calcium 4.5 mg/dL     CBC & Differential [[id number]] Collected:  03/11/17 1845    Specimen:  Blood Updated:  03/11/17 1854    Narrative:       The following orders were created for panel order CBC & Differential.  Procedure                               Abnormality         Status                     ---------                               -----------         ------                     CBC Auto Differential[[id number]]         Abnormal            Final result                 Please view results for these tests on the individual orders.    CBC Auto Differential [[id number]]  (Abnormal) Collected:  03/11/17 1845    Specimen:  Blood Updated:  03/11/17 1854     WBC 9.61 10*3/mm3      RBC 3.94 (L) 10*6/mm3      Hemoglobin 12.1 (L) g/dL      Hematocrit 35.2 (L) %      MCV 89.3 fL      MCH 30.7 pg      MCHC 34.4 g/dL      RDW 13.3 %      RDW-SD 44.3 (H) fl      MPV 9.0 fL      Platelets 345 10*3/mm3      Neutrophil % 71.2 %      Lymphocyte % 18.2 %      Monocyte % 9.2 %      Eosinophil % 0.7 %      Basophil % 0.1 %       Immature Grans % 0.6 (H) %      Neutrophils, Absolute 6.84 10*3/mm3      Lymphocytes, Absolute 1.75 10*3/mm3      Monocytes, Absolute 0.88 10*3/mm3      Eosinophils, Absolute 0.07 10*3/mm3      Basophils, Absolute 0.01 10*3/mm3      Immature Grans, Absolute 0.06 (H) 10*3/mm3     Lactic Acid, Plasma [[id number]]  (Normal) Collected:  03/11/17 1845    Specimen:  Blood Updated:  03/11/17 1902     Lactate 1.5 mmol/L     Comprehensive Metabolic Panel [[id number]]  (Abnormal) Collected:  03/11/17 1845    Specimen:  Blood Updated:  03/11/17 1903     Glucose 128 (H) mg/dL      BUN 19 mg/dL      Creatinine 0.62 (L) mg/dL      Sodium 140 mmol/L      Potassium 4.6 mmol/L      Chloride 99 mmol/L      CO2 33.0 (H) mmol/L      Calcium 8.2 (L) mg/dL      Total Protein 6.6 g/dL      Albumin 3.00 (L) g/dL      ALT (SGPT) 35 U/L      AST (SGOT) 31 U/L      Alkaline Phosphatase 115 U/L      Total Bilirubin 0.3 mg/dL      eGFR Non African Amer 133 (H) mL/min/1.73      Globulin 3.6 (H) gm/dL      A/G Ratio 0.8 (L) g/dL      BUN/Creatinine Ratio 30.6 (H)      Anion Gap 8.0 mmol/L     CK [[id number]]  (Abnormal) Collected:  03/11/17 1845    Specimen:  Blood Updated:  03/11/17 1950     Creatine Kinase <20 (L) U/L     Troponin [[id number]]  (Normal) Collected:  03/11/17 1845    Specimen:  Blood Updated:  03/11/17 2003     Troponin I <0.012 ng/mL     CK-MB [[id number]]  (Normal) Collected:  03/11/17 1845    Specimen:  Blood Updated:  03/11/17 2003     CKMB 0.32 ng/mL     BNP [[id number]]  (Normal) Collected:  03/11/17 1845    Specimen:  Blood Updated:  03/11/17 2003     proBNP 249.0 pg/mL     Protime-INR [[id number]]  (Normal) Collected:  03/11/17 1845    Specimen:  Blood Updated:  03/11/17 2009     Protime 13.7 Seconds      INR 1.05     Narrative:       Therapeutic range for most indications is 2.0-3.0 INR,  or 2.5-3.5 for mechanical heart valves.          Imaging Results (last 24 hours)     Procedure Component Value Units Date/Time    XR Chest 1 View  [[id number]] Collected:  03/11/17 1950     Updated:  03/11/17 1955    Narrative:       Patient Name:  ORAL FRYE  Patient ID:  [id number]   Ordering:  SHY SMITH  Attending:  SHY SMITH  Referring:  SHY SMITH  ------------------------------------------------  PROCEDURE: XR CHEST 1 VIEW    INDICATION FOR PROCEDURE:  61 years -old patient presents for  evaluation of midsternal chest pain    COMPARISON:  December 12, 2015.    FINDINGS: XR CHEST 1 view  reveals the lungs are expanded. There  is heterogeneous airspace consolidation and atelectasis. There is  left apical pleural thickening, similar to previous study. There  may also be related apical pleural thickening. There is blunting  of left lateral costophrenic angle suggesting a small pleural  effusion or pleural thickening.    There is no radiographic evidence for pneumothorax. Mediastinal  and cardiac silhouettes are within normal limits.     The bones are osteopenic. The skeletal structures are within  normal limits.     A large amount of bowel gas is visualized. A nonspecific lucency  is visible in the right upper quadrant that may represent bowel  gas.      Impression:         1.  Probable bilateral airspace disease. This likely represents  pneumonia.   2.  Patient has extensive sequela of pleural thickening and  pulmonary fibrosis.    Electronically signed by:  Jacquelyn Melendrez MD  3/11/2017 7:54 PM CST  Workstation: [id number]          Assessment/Plan     Principal Problem:    Acute on chronic respiratory failure with hypoxia and hypercapnia  Active Problems:    Decompensated COPD with exacerbation (chronic obstructive pulmonary disease)    Pneumonia    Chronic pain    IV steroids, IV antibiotics, nebulized treatments    Jaya Harrison MD  03/11/17  10:12 PM

## 2017-03-12 NOTE — ED PROVIDER NOTES
Subjective   HPI Comments: 61 years old well-developed but poor nourished male came complaining of shortness of breath.  He stated he lives alone and was brought by EMS.  He was given nebulizer treatment in route to the emergency department    He had a shortness of breath for 2 weeks chronic back problem and abdominal pain occasional for 4 weeks.    Past surgical history: Right hip surgery:, [place] had a [place] on his [place].  Hiatal hernia.    Denies any allergies.    Past medical history: COPD, diabetes, hepatitis, chronic back pain,        Family history: Diabetes, cancer.    Smokes tobacco about a pack a day of cigarettes.        Patient is a 61 y.o. male presenting with shortness of breath.   History provided by:  Patient  Shortness of Breath   Severity:  Moderate  Onset quality:  Gradual  Duration:  2 weeks  Timing:  Intermittent  Progression:  Waxing and waning  Chronicity:  Recurrent  Context: activity, fumes and smoke exposure    Relieved by:  Nothing  Worsened by:  Nothing  Ineffective treatments:  None tried  Associated symptoms: abdominal pain and wheezing    Associated symptoms: no chest pain, no cough, no fever, no headaches, no neck pain, no rash and no sore throat    Risk factors: alcohol use    Risk factors: no recent surgery        Review of Systems   Constitutional: Negative for activity change, appetite change, fatigue and fever.   HENT: Negative for congestion, facial swelling, mouth sores, nosebleeds, sore throat and trouble swallowing.    Eyes: Negative for discharge, redness and itching.   Respiratory: Positive for shortness of breath and wheezing. Negative for apnea and cough.    Cardiovascular: Negative for chest pain and palpitations.   Gastrointestinal: Positive for abdominal pain. Negative for blood in stool and nausea.   Endocrine: Negative for cold intolerance, heat intolerance, polydipsia, polyphagia and polyuria.   Genitourinary: Negative for difficulty urinating, dysuria, flank pain,  frequency and hematuria.   Musculoskeletal: Negative for gait problem, joint swelling and neck pain.   Skin: Negative.  Negative for color change, pallor and rash.   Allergic/Immunologic: Negative for environmental allergies.   Neurological: Negative for dizziness, seizures, syncope, speech difficulty, light-headedness, numbness and headaches.   Hematological: Negative for adenopathy.   Psychiatric/Behavioral: Negative for agitation, behavioral problems, confusion and sleep disturbance. The patient is not nervous/anxious.        Past Medical History   Diagnosis Date   • Chronic back pain    • Chronic hepatitis      B and C   • Chronic obstructive lung disease    • Chronic pain    • Drug dependence    • Nondependent alcohol abuse, in remission    • Pneumonia        No Known Allergies    Past Surgical History   Procedure Laterality Date   • Diaphragmatic hernia repair     • Hip surgery  12/14/2015   • Liver biopsy     • Joint replacement         Family History   Problem Relation Age of Onset   • Diabetes Neg Hx    • Hypertension Neg Hx    • Coronary artery disease Neg Hx        Social History     Social History   • Marital status:      Spouse name: N/A   • Number of children: N/A   • Years of education: N/A     Social History Main Topics   • Smoking status: Current Every Day Smoker     Packs/day: 1.00   • Smokeless tobacco: None   • Alcohol use No      Comment: quit 10 years ago, prev alcoholic   • Drug use: No      Comment: Misused opiates, quit 10 years ago   • Sexual activity: Defer     Other Topics Concern   • None     Social History Narrative   • None           Objective   Physical Exam   Constitutional: He is oriented to person, place, and time. He appears well-developed and well-nourished.   HENT:   Head: Normocephalic and atraumatic.   Nose: Nose normal.   Mouth/Throat: Oropharynx is clear and moist.   Eyes: Conjunctivae and EOM are normal. Pupils are equal, round, and reactive to light.   Neck: Normal  range of motion. Neck supple.   Cardiovascular: Normal rate, regular rhythm, normal heart sounds and intact distal pulses.    Pulmonary/Chest: Effort normal. He has wheezes.   Abdominal: Soft. Bowel sounds are normal.   Musculoskeletal: Normal range of motion.   Neurological: He is alert and oriented to person, place, and time.   Skin: Skin is warm and dry.   Psychiatric: He has a normal mood and affect. His behavior is normal. Judgment and thought content normal.   Nursing note and vitals reviewed.      Procedures         ED Course  ED Course        Labs Reviewed   COMPREHENSIVE METABOLIC PANEL - Abnormal; Notable for the following:        Result Value    Glucose 128 (*)     Creatinine 0.62 (*)     CO2 33.0 (*)     Calcium 8.2 (*)     Albumin 3.00 (*)     eGFR Non  Amer 133 (*)     Globulin 3.6 (*)     A/G Ratio 0.8 (*)     BUN/Creatinine Ratio 30.6 (*)     All other components within normal limits   CBC WITH AUTO DIFFERENTIAL - Abnormal; Notable for the following:     RBC 3.94 (*)     Hemoglobin 12.1 (*)     Hematocrit 35.2 (*)     RDW-SD 44.3 (*)     Immature Grans % 0.6 (*)     Immature Grans, Absolute 0.06 (*)     All other components within normal limits   BLOOD GAS, ARTERIAL - Abnormal; Notable for the following:     pCO2, Arterial 59.0 (*)     pO2, Arterial 67.8 (*)     HCO3, Arterial 34.7 (*)     Base Excess, Arterial 7.8 (*)     Hemoglobin, Blood Gas 12.7 (*)     Hematocrit, Blood Gas 37.0 (*)     CO2 Content 36.5 (*)     All other components within normal limits   CK - Abnormal; Notable for the following:     Creatine Kinase <20 (*)     All other components within normal limits   LACTIC ACID, PLASMA - Normal   PROTIME-INR - Normal    Narrative:     Therapeutic range for most indications is 2.0-3.0 INR,  or 2.5-3.5 for mechanical heart valves.   TROPONIN (IN-HOUSE) - Normal   CK MB - Normal   BNP (IN-HOUSE) - Normal   RAINBOW DRAW    Narrative:     The following orders were created for panel order  [place] Draw.  Procedure                               Abnormality         Status                     ---------                               -----------         ------                     Light Blue Top[[id number]]                                    Final result               Green Top (Gel)[[id number]]                                   Final result               Lavender Top[[id number]]                                      Final result               Gold Top - SST[[id number]]                                    Final result                 Please view results for these tests on the individual orders.   TROPONIN (IN-HOUSE)   TROPONIN (IN-HOUSE)   CK   CK   CK MB   CK MB   CBC AND DIFFERENTIAL    Narrative:     The following orders were created for panel order CBC & Differential.  Procedure                               Abnormality         Status                     ---------                               -----------         ------                     CBC Auto Differential[[id number]]         Abnormal            Final result                 Please view results for these tests on the individual orders.   LIGHT BLUE TOP   GREEN TOP   LAVENDER TOP   GOLD TOP - SST        XR Chest 1 View   Final Result      1.  Probable bilateral airspace disease. This likely represents   pneumonia.    2.  Patient has extensive sequela of pleural thickening and   pulmonary fibrosis.      Electronically signed by:  Jacquelyn Melendrez MD  3/11/2017 7:54 PM [facility]   [facility]: [facility]    Final diagnoses:   Decompensated COPD with exacerbation (chronic obstructive pulmonary disease)   Pneumonia due to infectious organism, unspecified laterality, unspecified part of lung   Hypoxia   Hypercapnia   Chronic low back pain without sciatica, unspecified back pain laterality   Smoker            Ritchie Alicea MD  03/11/17 5887

## 2017-03-13 LAB
BILIRUB UR QL STRIP: NEGATIVE
CLARITY UR: ABNORMAL
COLOR UR: YELLOW
GLUCOSE UR STRIP-MCNC: ABNORMAL MG/DL
HGB UR QL STRIP.AUTO: NEGATIVE
KETONES UR QL STRIP: NEGATIVE
LEUKOCYTE ESTERASE UR QL STRIP.AUTO: NEGATIVE
NITRITE UR QL STRIP: NEGATIVE
PH UR STRIP.AUTO: 7.5 [PH] (ref 5–9)
PROT UR QL STRIP: NEGATIVE
SP GR UR STRIP: 1.03 (ref 1–1.03)
UROBILINOGEN UR QL STRIP: ABNORMAL

## 2017-03-13 PROCEDURE — G0378 HOSPITAL OBSERVATION PER HR: HCPCS

## 2017-03-13 PROCEDURE — 25010000002 METHYLPREDNISOLONE PER 125 MG: Performed by: INTERNAL MEDICINE

## 2017-03-13 PROCEDURE — G8978 MOBILITY CURRENT STATUS: HCPCS

## 2017-03-13 PROCEDURE — 97530 THERAPEUTIC ACTIVITIES: CPT

## 2017-03-13 PROCEDURE — 81003 URINALYSIS AUTO W/O SCOPE: CPT | Performed by: INTERNAL MEDICINE

## 2017-03-13 PROCEDURE — 25010000002 CEFTRIAXONE: Performed by: INTERNAL MEDICINE

## 2017-03-13 PROCEDURE — 94760 N-INVAS EAR/PLS OXIMETRY 1: CPT

## 2017-03-13 PROCEDURE — 94799 UNLISTED PULMONARY SVC/PX: CPT

## 2017-03-13 PROCEDURE — G8998 SWALLOW D/C STATUS: HCPCS | Performed by: SPEECH-LANGUAGE PATHOLOGIST

## 2017-03-13 PROCEDURE — 97116 GAIT TRAINING THERAPY: CPT

## 2017-03-13 PROCEDURE — 92526 ORAL FUNCTION THERAPY: CPT | Performed by: SPEECH-LANGUAGE PATHOLOGIST

## 2017-03-13 PROCEDURE — G8979 MOBILITY GOAL STATUS: HCPCS

## 2017-03-13 PROCEDURE — 97162 PT EVAL MOD COMPLEX 30 MIN: CPT

## 2017-03-13 RX ORDER — SODIUM CHLORIDE 9 MG/ML
INJECTION, SOLUTION INTRAVENOUS
Status: COMPLETED
Start: 2017-03-13 | End: 2017-03-13

## 2017-03-13 RX ORDER — GUAIFENESIN/DEXTROMETHORPHAN 100-10MG/5
5 SYRUP ORAL EVERY 6 HOURS PRN
Status: DISCONTINUED | OUTPATIENT
Start: 2017-03-13 | End: 2017-03-15 | Stop reason: HOSPADM

## 2017-03-13 RX ORDER — NICOTINE 21 MG/24HR
1 PATCH, TRANSDERMAL 24 HOURS TRANSDERMAL EVERY 24 HOURS
Status: DISCONTINUED | OUTPATIENT
Start: 2017-03-13 | End: 2017-03-15 | Stop reason: HOSPADM

## 2017-03-13 RX ADMIN — Medication 10 ML: at 09:22

## 2017-03-13 RX ADMIN — GUAIFENESIN AND DEXTROMETHORPHAN 5 ML: 100; 10 SYRUP ORAL at 17:50

## 2017-03-13 RX ADMIN — ALBUTEROL SULFATE 2.5 MG: 2.5 SOLUTION RESPIRATORY (INHALATION) at 19:40

## 2017-03-13 RX ADMIN — BUDESONIDE AND FORMOTEROL FUMARATE DIHYDRATE 2 PUFF: 160; 4.5 AEROSOL RESPIRATORY (INHALATION) at 07:31

## 2017-03-13 RX ADMIN — CEFTRIAXONE 1 G: 1 INJECTION, POWDER, FOR SOLUTION INTRAMUSCULAR; INTRAVENOUS at 20:30

## 2017-03-13 RX ADMIN — OXYCODONE HYDROCHLORIDE AND ACETAMINOPHEN 1 TABLET: 10; 325 TABLET ORAL at 09:20

## 2017-03-13 RX ADMIN — METHYLPREDNISOLONE SODIUM SUCCINATE 60 MG: 125 INJECTION, POWDER, FOR SOLUTION INTRAMUSCULAR; INTRAVENOUS at 11:18

## 2017-03-13 RX ADMIN — ASPIRIN 81 MG 81 MG: 81 TABLET ORAL at 09:16

## 2017-03-13 RX ADMIN — ALBUTEROL SULFATE 2.5 MG: 2.5 SOLUTION RESPIRATORY (INHALATION) at 07:26

## 2017-03-13 RX ADMIN — OXYCODONE HYDROCHLORIDE AND ACETAMINOPHEN 1 TABLET: 10; 325 TABLET ORAL at 05:29

## 2017-03-13 RX ADMIN — Medication 3 ML: at 20:30

## 2017-03-13 RX ADMIN — NICOTINE 1 PATCH: 21 PATCH TRANSDERMAL at 14:11

## 2017-03-13 RX ADMIN — GABAPENTIN 200 MG: 100 CAPSULE ORAL at 14:05

## 2017-03-13 RX ADMIN — GABAPENTIN 200 MG: 100 CAPSULE ORAL at 05:32

## 2017-03-13 RX ADMIN — OXYCODONE HYDROCHLORIDE AND ACETAMINOPHEN 1 TABLET: 10; 325 TABLET ORAL at 17:58

## 2017-03-13 RX ADMIN — BUDESONIDE AND FORMOTEROL FUMARATE DIHYDRATE 2 PUFF: 160; 4.5 AEROSOL RESPIRATORY (INHALATION) at 19:40

## 2017-03-13 RX ADMIN — OXYCODONE HYDROCHLORIDE AND ACETAMINOPHEN 1 TABLET: 10; 325 TABLET ORAL at 14:12

## 2017-03-13 RX ADMIN — METHYLPREDNISOLONE SODIUM SUCCINATE 60 MG: 125 INJECTION, POWDER, FOR SOLUTION INTRAMUSCULAR; INTRAVENOUS at 05:32

## 2017-03-13 RX ADMIN — GABAPENTIN 200 MG: 100 CAPSULE ORAL at 21:29

## 2017-03-13 RX ADMIN — SODIUM CHLORIDE 250 ML: 9 INJECTION, SOLUTION INTRAVENOUS at 20:30

## 2017-03-13 RX ADMIN — OXYCODONE HYDROCHLORIDE AND ACETAMINOPHEN 1 TABLET: 10; 325 TABLET ORAL at 22:19

## 2017-03-13 RX ADMIN — METHYLPREDNISOLONE SODIUM SUCCINATE 60 MG: 125 INJECTION, POWDER, FOR SOLUTION INTRAMUSCULAR; INTRAVENOUS at 17:41

## 2017-03-13 RX ADMIN — METHYLPREDNISOLONE SODIUM SUCCINATE 60 MG: 125 INJECTION, POWDER, FOR SOLUTION INTRAMUSCULAR; INTRAVENOUS at 22:19

## 2017-03-13 NOTE — PLAN OF CARE
Problem: Patient Care Overview (Adult)  Goal: Plan of Care Review  Outcome: Ongoing (interventions implemented as appropriate)    03/13/17 0915   Coping/Psychosocial Response Interventions   Plan Of Care Reviewed With patient   Outcome Evaluation   Outcome Summary/Follow up Plan Initial PT evaluation complete. Patient demonstrates low fall risk (Tinetti: 25/28) but can be impulsive at times and de-sats when he removes his supplemental O2. Patient would benefit from skilled PT to return to [facility] and to community.        Goal: Discharge Needs Assessment  Outcome: Ongoing (interventions implemented as appropriate)    03/13/17 0915   Self-Care   Equipment Currently Used at Home cane, straight  (Patient also has RW and W/C but reports he does not use them)         Problem: Inpatient Physical Therapy  Goal: Gait Training Goal STG- PT  Outcome: Ongoing (interventions implemented as appropriate)    03/13/17 0915   Gait Training PT STG   Gait Training Goal PT STG, Date Established 03/13/17   Gait Training Goal PT STG, Time to Achieve by discharge   Gait Training Goal PT STG, Goehner Level conditional independence   Gait Training Goal PT STG, Assist Device cane, straight   Gait Training Goal PT STG, Distance to Achieve 300   Gait Training Goal PT STG, Date Goal Reviewed 03/13/17       Goal: Stair Training Goal STG- PT  Outcome: Ongoing (interventions implemented as appropriate)    03/13/17 0915   Stair Training PT STG   Stair Training Goal PT STG, Date Established 03/13/17   Stair Training Goal PT STG, Time to Achieve by discharge   Stair Training Goal PT STG, Number of Steps 12   Stair Training Goal PT STG, Goehner Level conditional independence   Stair Training Goal PT STG, Assist Device 1 handrail   Stair Training Goal PT STG, Date Goal Reviewed 03/13/17       Goal: Physical Therapy Goal 2 LTG  Outcome: Ongoing (interventions implemented as appropriate)    03/13/17 0915   Physical Therapy- 2 LTG   Physical Therapy  PT LTG 2, Date Established 03/13/17   Physical Therapy PT LTG 2, Activity Type Tinetti Fall Risk Assessment.   Physical Therapy PT LTG 2, Additional Goal Score 27/28 or low fall risk.   Physical Therapy PT LTG 2, Date Goal Reviewed 03/13/17

## 2017-03-13 NOTE — THERAPY DISCHARGE NOTE
Acute Care - Speech Language Pathology /Discharge  [facility]     Patient Name: Héctor Koo  : 1955  MRN: [id number]  Today's Date: 3/13/2017         Admit Date: 3/11/2017   Pt present with no s/s of aspiration. Pt swallow WFL. Pt with increased po intake this date. Pt to remain on regular diet and regular liquids.         Visit Dx:     ICD-10-CM ICD-9-CM   1. Decompensated COPD with exacerbation (chronic obstructive pulmonary disease) J44.1 491.21   2. Pneumonia due to infectious organism, unspecified laterality, unspecified part of lung J18.9 136.9     484.8   3. Hypoxia R09.02 799.02   4. Hypercapnia R06.89 786.09   5. Chronic low back pain without sciatica, unspecified back pain laterality M54.5 724.2    G89.29 338.29   6. Smoker F17.200 305.1   7. Dysphagia, pharyngeal phase R13.13 787.23   8. Impaired physical mobility Z74.09 781.99     Patient Active Problem List   Diagnosis   • Decompensated COPD with exacerbation (chronic obstructive pulmonary disease)   • Chronic pain   • Acute on chronic respiratory failure with hypoxia and hypercapnia   • Pneumonia              Adult Rehabilitation Note       17 1134 17 0915       Rehab Assessment/Intervention    Discipline speech language pathologist  -EK physical therapist  -CZ     Document Type therapy note (daily note)  -EK evaluation  -CZ     Subjective Information no complaints;agree to therapy  -EK agree to therapy;complains of;pain  -CZ     Patient Effort, Rehab Treatment good  -EK good  -CZ     Symptoms Noted During/After Treatment  shortness of breath  -CZ     Precautions/Limitations fall precautions  -EK fall precautions   Low fall risk ([place]: ).  -CZ     Precautions/Limitations, Vision WFL  -EK      Precautions/Limitations, Hearing WFL  -EK      Equipment Issued to Patient  gait belt  -CZ     Recorded by [EK] Corinne Gonzalez, TIM-SLP [CZ] Nash Diallo, PT     Vital Signs    Pre Systolic BP Rehab  118   "-CZ     Pre Treatment Diastolic BP  65  -CZ     Post Systolic BP Rehab  132  -CZ     Post Treatment Diastolic BP  72  -CZ     Pretreatment Heart Rate (beats/min)  89  -CZ     Posttreatment Heart Rate (beats/min)  81  -CZ     Pre SpO2 (%)  94  -CZ     O2 Delivery Pre Treatment  supplemental O2   2 LPM  -CZ     Intra SpO2 (%)  79   Returned to 93% in 30 sec with 2 LPM supplemental O2.  -CZ     O2 Delivery Intra Treatment  room air   Patient removed his supplemental O2 to ambulate to bathroom.  -CZ     Post SpO2 (%)  94  -CZ     O2 Delivery Post Treatment  supplemental O2  -CZ     Pre Patient Position  Supine  -CZ     Intra Patient Position  Standing  -CZ     Post Patient Position  Supine  -CZ     Recorded by  [CZ] Nash Diallo, PT     Pain Assessment    Pain Assessment 0-10  -EK 0-10  -CZ     Pain Score 6  -EK 8  -CZ     Post Pain Score 6  -EK 6  -CZ     Pain Type Chronic pain  -EK Chronic pain  -CZ     Pain Location Back  -EK Back  -CZ     Pain Orientation  Mid  -CZ     Pain Intervention(s)  Medication (See MAR)  -CZ     Response to Interventions  --   Patient reports,\"It's easing down.\"  -CZ     Recorded by [EK] Corinne Gonzalez, CCC-SLP [CZ] Nash Diallo, PT     Vision Assessment/Intervention    Visual Impairment  WFL  -CZ     Recorded by  [CZ] Nash Diallo, PT     Cognitive Assessment/Intervention    Current Cognitive/Communication Assessment  functional  -CZ     Orientation Status  oriented x 4  -CZ     Follows Commands/Answers Questions  100% of the time  -CZ     Personal Safety  mild impairment  -CZ     Personal Safety Interventions  nonskid shoes/slippers when out of bed;gait belt  -CZ     Recorded by  [CZ] Nash Diallo, PT     Dysphagia Other 1    Dysphagia Other 1 Objective Pt tolerate least restrictive diet with no s/s of aspiration for adequate nutrition and hydration.   -EK      Status: Dysphagia Other 1 Achieved  -EK      Dysphagia Other 1 Progress 100%;without cues  -EK      " Comments: Dysphagia Other 1 Goal met with no s/s of aspiration    Pt had  salad with adequate mastication & swallow int.  -EK      Recorded by [EK] Corinne Gonzalez CCC-SLP      ROM (Range of Motion)    General ROM  no range of motion deficits identified  -CZ     Recorded by  [CZ] Nash Diallo PT     MMT (Manual Muscle Testing)    General MMT Assessment  no strength deficits identified  -CZ     General MMT Assessment Detail  4+/5 bilateral LEs.  -CZ     Recorded by  [CZ] Nash Diallo, PT     Mobility Assessment/Training    Extremity Weight-Bearing Status  --   WBAT  -CZ     Recorded by  [CZ] Nash Diallo PT     Bed Mobility, Assessment/Treatment    Bed Mobility, Roll Left, [place]  independent  -CZ     Bed Mobility, Roll Right, [place]  independent  -CZ     Bed Mobility, Scoot/Bridge, [place]  independent  -CZ     Bed Mob, Supine to Sit, [place]  independent  -CZ     Bed Mob, Sit to Supine, [place]  independent  -CZ     Bed Mob, Sidelying to Sit, [place]  independent  -CZ     Bed Mob, Sit to Sidelying, [place]  independent  -CZ     Recorded by  [CZ] Nash Diallo, PT     Transfer Assessment/Treatment    Transfers, Sit-Stand [place]  conditional independence  -CZ     Transfers, Stand-Sit [place]  conditional independence  -CZ     Transfers, Sit-Stand-Sit, Assist Device  rolling walker  -CZ     Transfer, Impairments  --   Impulsive at times.  -CZ     Recorded by  [CZ] Nash Diallo, PT     Gait Assessment/Treatment    Gait, [place] Level  contact guard assist  -CZ     Gait, Assistive Device  rolling walker  -CZ     Gait, Distance (Feet)  200  -CZ     Gait, Gait Pattern Analysis  swing-through gait  -CZ     Gait, Impairments  --   Shortness of breath.  -CZ     Recorded by  [CZ] Nash Diallo, PT     Positioning and Restraints    Pre-Treatment Position in bed  -EK in bed  -CZ     Post Treatment Position bed  -EK bed  -CZ     In Bed call  light within reach;encouraged to call for assist  -EK side lying right;exit alarm on  -CZ     Recorded by [EK] Corinne Gonzalez CCC-SLP [CZ] Nash Diallo, PT       User Key  (r) = Recorded By, (t) = Taken By, (c) = Cosigned By    Initials Name Effective Dates    RACHELL JungSLP 12/30/16 -     CZ Nash Diallo, PT 02/17/17 -               IP SLP Goals       03/13/17 1140 03/12/17 0907       Safely Consume Diet    Safely Consume Diet- SLP, Date Established  03/12/17  -EK     Safely Consume Diet- SLP, Time to Achieve  2 - 3 days  -EK     Safely Consume Diet- SLP, Additional Goal  Pt to tolerate least restrictive diet with no s/s of aspiration for adequate nutrition/hydration.   -EK     Safely Consume Diet- SLP, Date Goal Reviewed 03/13/17  -EK      Safely Consume Diet- SLP, Outcome goal met  -EK        User Key  (r) = Recorded By, (t) = Taken By, (c) = Cosigned By    Initials Name Provider Type    SHAHZAD Jung Speech and Language Pathologist          EDUCATION  The patient has been educated in the following areas:   Dysphagia (Swallowing Impairment).    SLP Recommendation and Plan              Anticipated Discharge Disposition: home with home health                Plan of Care Review  Plan Of Care Reviewed With: patient  Progress: improving  Outcome Summary/Follow up Plan: Swallow WFL at this time; no further reports of globus.            Time Calculation:         Time Calculation- SLP       03/13/17 1147          Time Calculation- SLP    SLP Start Time 1134  -EK      SLP Stop Time 1149  -EK      SLP Time Calculation (min) 15 min  -EK      SLP Received On 03/13/17  -EK        User Key  (r) = Recorded By, (t) = Taken By, (c) = Cosigned By    Initials Name Provider Type    RYLEY Gonzalez CCC-SLP Speech and Language Pathologist          Therapy Charges for Today     Code Description Service Date Service Provider Modifiers Qty     45611857462 HC ST SWALLOWING CURRENT STATUS 3/12/2017 SHAHZAD Haq GN, CI 1    19622451939 HC ST SWALLOWING PROJECTED 3/12/2017 SHAHZAD Haq GN, CI 1    89969338620 HC ST EVAL ORAL PHARYNG SWALLOW 2 3/12/2017 SHAHZAD Haq GN 1    77466178531 HC ST SWALLOWING DISCHARGE 3/13/2017 SHAHZAD Haq GN, CI 1    06769346757 HC ST TREATMENT SWALLOW 1 3/13/2017 SHAHZAD Haq GN 1          SLP G-Codes  Functional Limitations: Swallowing  Swallow Current Status (): At least 1 percent but less than 20 percent impaired, limited or restricted  Swallow Goal Status (): At least 1 percent but less than 20 percent impaired, limited or restricted  Swallow Discharge Status (): At least 1 percent but less than 20 percent impaired, limited or restricted    SLP Discharge Summary  Anticipated Discharge Disposition: home with home health  Reason for Discharge: At baseline function  Outcomes Achieved: Able to achieve all goals within established timeline  Discharge Destination: other (comment) (D/C from SLP due to goals met.)    SHAHZAD Haq  3/13/2017

## 2017-03-13 NOTE — PLAN OF CARE
Problem: Patient Care Overview (Adult)  Goal: Plan of Care Review  Outcome: Outcome(s) achieved Date Met:  03/13/17 03/13/17 1140   Coping/Psychosocial Response Interventions   Plan Of Care Reviewed With patient   Patient Care Overview   Progress improving   Outcome Evaluation   Outcome Summary/Follow up Plan Swallow WFL at this time; no further reports of globus.         Problem: Inpatient SLP  Goal: Dysphagia- Patient will safely consume diet as per recommendation with no signs/symptoms of aspiration  Outcome: Outcome(s) achieved Date Met:  03/13/17 03/12/17 0907 03/13/17 1140   Safely Consume Diet   Safely Consume Diet- SLP, Date Established 03/12/17 --    Safely Consume Diet- SLP, Time to Achieve 2 - 3 days --    Safely Consume Diet- SLP, Additional Goal Pt to tolerate least restrictive diet with no s/s of aspiration for adequate nutrition/hydration.  --    Safely Consume Diet- SLP, Date Goal Reviewed --  03/13/17   Safely Consume Diet- SLP, Outcome --  goal met

## 2017-03-13 NOTE — PROGRESS NOTES
Progress Note  Jaya Harrison MD  Hospitalist     LOS: 0 days   Patient Care Team:  Cory Correia MD as PCP - General    Chief Complaint: dyspnea    Subjective     Interval History:     Patient Complaints: cough, congestion, wheezing    History taken from: patient    Medication Review:   Current Facility-Administered Medications   Medication Dose Route Frequency Provider Last Rate Last Dose   • albuterol (PROVENTIL) nebulizer solution 0.083% 2.5 mg/3mL  2.5 mg Nebulization Q6H - RT Jaya Harrison MD   2.5 mg at 03/13/17 0726   • aspirin chewable tablet 81 mg  81 mg Oral Daily Jaya Harrison MD   81 mg at 03/13/17 0916   • budesonide-formoterol (SYMBICORT) 160-4.5 MCG/ACT inhaler 2 puff  2 puff Inhalation BID - RT Jaya Harrison MD   2 puff at 03/13/17 0731   • cefTRIAXone (ROCEPHIN) 1 g/100 mL 0.9% NS (MBP)  1 g Intravenous Q24H Jaya Harrison MD   Stopped at 03/12/17 2240   • gabapentin (NEURONTIN) capsule 200 mg  200 mg Oral Q8H Jaya Harrison MD   200 mg at 03/13/17 0532   • ibuprofen (ADVIL,MOTRIN) tablet 400 mg  400 mg Oral Q6H PRN Jaya Harrison MD       • influenza vac split quad (FLUZONE QUADRIVALENT) IM suspension 0.5 mL  0.5 mL Intramuscular During Hospitalization Jaya Harrison MD       • LORazepam (ATIVAN) tablet 1 mg  1 mg Oral Q6H PRN Jaya Harrison MD       • methylPREDNISolone sodium succinate (SOLU-Medrol) injection 60 mg  60 mg Intravenous Q6H Jaya Harrison MD   60 mg at 03/13/17 1118   • nicotine (NICODERM CQ) 21 MG/24HR patch 1 patch  1 patch Transdermal Q24H Jaya Harrison MD       • oxyCODONE-acetaminophen (PERCOCET)  MG per tablet 1 tablet  1 tablet Oral Q4H PRN Jaya Harrison MD   1 tablet at 03/13/17 0920   • sodium chloride 0.9 % flush 1-10 mL  1-10 mL Intravenous PRN Jaya Harrison MD   10 mL at 03/12/17 1044   • sodium chloride 0.9 % flush 10 mL  10 mL Intravenous PRN Ritchie Alicea MD   10 mL at 03/13/17 0922   • traZODone (DESYREL) tablet 50 mg  50 mg Oral Nightly Jaya  MD Hunter   50 mg at 03/11/17 3079       Review of Systems:   Review of Systems   Constitutional: Positive for fatigue.   Respiratory: Positive for cough, shortness of breath and wheezing.    Cardiovascular: Negative for chest pain, palpitations and leg swelling.   Gastrointestinal: Negative for abdominal pain.   Genitourinary: Negative for frequency and urgency.   Musculoskeletal: Positive for back pain and neck stiffness.   Skin: Negative for pallor.   Neurological: Positive for weakness, light-headedness and numbness.   Psychiatric/Behavioral: Negative for agitation and behavioral problems.   All other systems reviewed and are negative.      Objective     Vital Signs  Temp:  [96.3 °F (35.7 °C)-97.9 °F (36.6 °C)] 96.3 °F (35.7 °C)  Heart Rate:  [70-98] 70  Resp:  [18] 18  BP: (114-158)/(62-88) 114/62    Physical Exam:  Physical Exam   Constitutional: He is oriented to person, place, and time. He appears well-developed and well-nourished. He appears cachectic.   HENT:   Head: Normocephalic and atraumatic.   Eyes: EOM are normal.   Neck: Neck supple.   Cardiovascular: Normal rate and regular rhythm.    Pulmonary/Chest: Effort normal. He has wheezes.   Abdominal: Soft. Bowel sounds are normal.   Musculoskeletal: Normal range of motion. He exhibits no edema or tenderness.   Neurological: He is alert and oriented to person, place, and time.   Skin: Skin is warm and dry.   Psychiatric: He has a normal mood and affect. His behavior is normal.   Vitals reviewed.       Results Review:    Lab Results (last 24 hours)     ** No results found for the last 24 hours. **          Imaging Results (last 24 hours)     ** No results found for the last 24 hours. **          Assessment/Plan     Principal Problem:    Acute on chronic respiratory failure with hypoxia and hypercapnia  Active Problems:    Decompensated COPD with exacerbation (chronic obstructive pulmonary disease)    Pneumonia    Chronic pain    Continue IV steroids,  yaritza napoles PT/OT    Jaya Harrison MD  03/13/17  1:31 PM

## 2017-03-13 NOTE — PROGRESS NOTES
Acute Care - Physical Therapy Initial Evaluation  [facility]     Patient Name: Héctor Koo  : 1955  MRN: [id number]  Today's Date: 3/13/2017   Onset of Illness/Injury or Date of Surgery Date: 17  Date of Referral to PT: 17  Referring Physician: Dr. Marie      Admit Date: 3/11/2017     Visit Dx:    ICD-10-CM ICD-9-CM   1. Decompensated COPD with exacerbation (chronic obstructive pulmonary disease) J44.1 491.21   2. Pneumonia due to infectious organism, unspecified laterality, unspecified part of lung J18.9 136.9     484.8   3. Hypoxia R09.02 799.02   4. Hypercapnia R06.89 786.09   5. Chronic low back pain without sciatica, unspecified back pain laterality M54.5 724.2    G89.29 338.29   6. Smoker F17.200 305.1   7. Dysphagia, pharyngeal phase R13.13 787.23   8. Impaired physical mobility Z74.09 781.99     Patient Active Problem List   Diagnosis   • Decompensated COPD with exacerbation (chronic obstructive pulmonary disease)   • Chronic pain   • Acute on chronic respiratory failure with hypoxia and hypercapnia   • Pneumonia     Past Medical History   Diagnosis Date   • Chronic back pain    • Chronic hepatitis      B and C   • Chronic obstructive lung disease    • Chronic pain    • Drug dependence    • Nondependent alcohol abuse, in remission    • Pneumonia      Past Surgical History   Procedure Laterality Date   • Diaphragmatic hernia repair     • Hip surgery  2015   • Liver biopsy     • Joint replacement            PT ASSESSMENT (last 72 hours)      PT Evaluation       17 0915 17 0840    Rehab Evaluation    Document Type evaluation  -CZ evaluation  -EK    Subjective Information agree to therapy;complains of;pain  -CZ no complaints  -EK    Patient Effort, Rehab Treatment good  -CZ     Symptoms Noted During/After Treatment shortness of breath  -CZ     General Information    Patient Profile Review yes  -CZ     Onset of Illness/Injury or Date of Surgery Date 17   -CZ     Referring Physician Dr. Marie  -CZ     Pertinent History Of Current Problem Patient admitted with Resp. Failure with Hypoxia and Hypercapnia.  -CZ     Precautions/Limitations fall precautions   Low fall risk (Tinetti: 25/29).  -CZ     Prior Level of Function independent:;community mobility;gait;transfer;bed mobility  -CZ     Equipment Currently Used at Home cane, straight   Patient also has RW and W/C but reports he does not use them  -CZ     Plans/Goals Discussed With patient  -CZ     Risks Reviewed patient:  -CZ     Benefits Reviewed patient:  -CZ     Living Environment    Lives With alone  -CZ     Living Arrangements apartment  -CZ     Home Accessibility stairs to enter home  -CZ     Number of Stairs to Enter Home 12  -CZ     Stair Railings at Home outside, present on right side  -CZ     Clinical Impression    Date of Referral to PT 03/12/17  -CZ     PT Diagnosis Impaired Physical Mobility  -CZ     Functional Level At Time Of Evaluation CGA with gait, Independent with transfers.  -CZ     Patient/Family Goals Statement Patient wishes to return home.  -CZ     Criteria for Skilled Therapeutic Interventions Met treatment indicated  -CZ     Impairments Found (describe specific impairments) gait, locomotion, and balance;aerobic capacity/endurance  -CZ     Rehab Potential good, to achieve stated therapy goals  -CZ     Predicted Duration of Therapy Intervention (days/wks) 14 days  -CZ     Vital Signs    Pre Systolic BP Rehab 118  -CZ     Pre Treatment Diastolic BP 65  -CZ     Post Systolic BP Rehab 132  -CZ     Post Treatment Diastolic BP 72  -CZ     Pretreatment Heart Rate (beats/min) 89  -CZ     Posttreatment Heart Rate (beats/min) 81  -CZ     Pre SpO2 (%) 94  -CZ     O2 Delivery Pre Treatment supplemental O2   2 LPM  -CZ     Intra SpO2 (%) 79   Returned to 93% in 30 sec with 2 LPM supplemental O2.  -CZ     O2 Delivery Intra Treatment room air   Patient removed his supplemental O2 to ambulate to bathroom.  -CZ   "   Post SpO2 (%) 94  -CZ     O2 Delivery Post Treatment supplemental O2  -CZ     Pre Patient Position Supine  -CZ     Intra Patient Position Standing  -CZ     Post Patient Position Supine  -CZ     Pain Assessment    Pain Assessment 0-10  -CZ 0-10  -EK    Pain Score 8  -CZ 7  -EK    Post Pain Score 6  -CZ 7  -EK    Pain Type Chronic pain  -CZ Chronic pain  -EK    Pain Location Back  -CZ Back  -EK    Pain Orientation Mid  -CZ     Pain Intervention(s) Medication (See MAR)  -CZ     Response to Interventions --   Patient reports,\"It's easing down.\"  -CZ     Vision Assessment/Intervention    Visual Impairment WFL  -CZ     Cognitive Assessment/Intervention    Current Cognitive/Communication Assessment functional  -CZ functional  -EK    Orientation Status oriented x 4  -CZ     Follows Commands/Answers Questions 100% of the time  -CZ     Personal Safety mild impairment  -CZ     Personal Safety Interventions nonskid shoes/slippers when out of bed;gait belt  -CZ     ROM (Range of Motion)    General ROM no range of motion deficits identified  -CZ     MMT (Manual Muscle Testing)    General MMT Assessment no strength deficits identified  -CZ     General MMT Assessment Detail 4+/5 bilateral LEs.  -CZ     Mobility Assessment/Training    Extremity Weight-Bearing Status --   WBAT  -CZ     Bed Mobility, Assessment/Treatment    Bed Mobility, Roll Left, [place] independent  -CZ     Bed Mobility, Roll Right, [place] independent  -CZ     Bed Mobility, Scoot/Bridge, [place] independent  -CZ     Bed Mob, Supine to Sit, [place] independent  -CZ     Bed Mob, Sit to Supine, [place] independent  -CZ     Bed Mob, Sidelying to Sit, [place] independent  -CZ     Bed Mob, Sit to Sidelying, [place] independent  -CZ     Transfer Assessment/Treatment    Transfers, Sit-Stand [place] conditional independence  -CZ     Transfers, Stand-Sit [place] conditional independence  -CZ     Transfers, Sit-Stand-Sit, " Assist Device rolling walker  -CZ     Transfer, Impairments --   Impulsive at times.  -CZ     Gait Assessment/Treatment    Gait, [place] Level contact guard assist  -CZ     Gait, Assistive Device rolling walker  -CZ     Gait, Distance (Feet) 200  -CZ     Gait, Gait Pattern Analysis swing-through gait  -CZ     Gait, Impairments --   Shortness of breath.  -CZ     Positioning and Restraints    Pre-Treatment Position in bed  -CZ in bed  -EK    Post Treatment Position bed  -CZ bed  -EK    In Bed side lying right;exit alarm on  -CZ call light within reach;encouraged to call for assist  -EK      03/11/17 8513       General Information    Equipment Currently Used at Home none  -     Living Environment    Lives With alone  -     Living Arrangements apartment  -     Home Accessibility no concerns  -     Stair Railings at Home outside, present on right side  -     Type of Financial/Environmental Concern none  -     Transportation Available none  -       User Key  (r) = Recorded By, (t) = Taken By, (c) = Cosigned By    Initials Name Provider Type     Corinne Gonzalez CCC-SLP Speech and Language Pathologist     Jacquelyn Nelson, RN Registered Nurse     Nash Diallo, PT Physical Therapist          Physical Therapy Education     Title: PT OT SLP Therapies (Active)     Topic: Physical Therapy (Active)     Point: Precautions (Active)    Learning Progress Summary    Learner Readiness Method Response Comment Documented by Status   Patient Acceptance E NR Discussed results of Tinetti Fall Risk Assessment with patient: 25/28 or low fall risk.  However, patient removes his O2 at times.  Cautioned patient to avoid this as his O2 sats decreased to 79.  03/13/17 1103 Active                      User Key     Initials Effective Dates Name Provider Type Discipline     02/17/17 -  Nash Diallo, PT Physical Therapist PT                PT Recommendation and Plan  Anticipated Discharge Disposition:  home with home health  Planned Therapy Interventions: balance training, gait training, neuromuscular re-education, stair training  PT Frequency: other (see comments) (5-14x/week.)  Plan of Care Review  Plan Of Care Reviewed With: patient  Outcome Summary/Follow up Plan: Initial PT evaluation complete.  Patient demonstrates low fall risk (Tinetti: 25/28) but can be impulsive at times and  de-sats when he removes his supplemental O2. Patient would benefit from skilled PT to return to [facility] and to community.           IP PT Goals       03/13/17 0915          Gait Training PT STG    Gait Training Goal PT STG, Date Established 03/13/17  -CZ      Gait Training Goal PT STG, Time to Achieve by discharge  -CZ      Gait Training Goal PT STG, [place] Level conditional independence  -CZ      Gait Training Goal PT STG, Assist Device cane, straight  -CZ      Gait Training Goal PT STG, Distance to Achieve 300  -CZ      Gait Training Goal PT STG, Date Goal Reviewed 03/13/17  -CZ      Stair Training PT STG    Stair Training Goal PT STG, Date Established 03/13/17  -CZ      Stair Training Goal PT STG, Time to Achieve by discharge  -CZ      Stair Training Goal PT STG, Number of Steps 12  -CZ      Stair Training Goal PT STG, [place] Level conditional independence  -CZ      Stair Training Goal PT STG, Assist Device 1 handrail  -CZ      Stair Training Goal PT STG, Date Goal Reviewed 03/13/17  -CZ      Physical Therapy- 2 LTG    Physical Therapy PT LTG 2, Date Established 03/13/17  -CZ      Physical Therapy PT LTG 2, Activity Type Tinetti Fall Risk Assessment.  -CZ      Physical Therapy PT LTG 2, Additional Goal Score 27/28 or low fall risk.  -CZ      Physical Therapy PT LTG 2, Date Goal Reviewed 03/13/17  -CZ        User Key  (r) = Recorded By, (t) = Taken By, (c) = Cosigned By    Initials Name Provider Type    DARREN Diallo PT Physical Therapist                Outcome Measures       03/13/17 0915          Shane  "Assessment    Tinetti Assessment yes  -CZ      Sitting Balance 1  -CZ      Arises 2  -CZ      Attempts to Rise 2  -CZ      Immediate Standing Balance (first 5 sec) 2  -CZ      Standing Balance 2  -CZ      Sternal Nudge (feet close together) 1  -CZ      Eyes Closed (feet close together) 1  -CZ      Turning 360 Degrees- Steps 1  -CZ      Turning 360 Degrees- Steadiness 1  -CZ      Sitting Down 1  -CZ      Tinetti Balance Score 14  -CZ      Gait Initiation (immediate after told \"go\") 1  -CZ      Step Length- Right Swing 1  -CZ      Step Length- Left Swing 1  -CZ      Foot Clearance- Right Foot 1  -CZ      Foot Clearance- Left Foot 1  -CZ      Step Symmetry 1  -CZ      Step Continuity 1  -CZ      Path (excursion) 1  -CZ      Trunk 2  -CZ      Base of Support 1  -CZ      Gait Score 11  -CZ      Tinetti Total Score 25  -CZ      Tinetti Assistive Device rolling walker  -CZ      Tinetti Assessment Comments Low fall risk.  -CZ      Functional Assessment    Outcome Measure Options Tinetti  -CZ        User Key  (r) = Recorded By, (t) = Taken By, (c) = Cosigned By    Initials Name Provider Type    CZ Nash Diallo, PT Physical Therapist           Time Calculation:         PT Charges       03/13/17 1111          Time Calculation    Start Time 0915  -CZ      Stop Time 1009  -CZ      Time Calculation (min) 54 min  -CZ      Time Calculation- PT    Total Timed Code Minutes- PT 39 minute(s)  -CZ        User Key  (r) = Recorded By, (t) = Taken By, (c) = Cosigned By    Initials Name Provider Type    CZ Nash Diallo, PT Physical Therapist          Therapy Charges for Today     Code Description Service Date Service Provider Modifiers Qty    28425208394 HC PT MOBILITY CURRENT 3/13/2017 Nash Diallo, PT GP, CI 1    74799200470 HC PT MOBILITY PROJECTED 3/13/2017 Nash Diallo, PT GP, CH 1    69957452448 HC PT EVAL MOD COMPLEXITY 1 3/13/2017 Nash Diallo, PT GP 1    36105377071 HC GAIT TRAINING EA 15 MIN 3/13/2017 Nash SYED" Imani, PT GP 1    [id number]  PT THERAPEUTIC ACT EA 15 MIN 3/13/2017 Nash Diallo, PT GP 2          PT G-Codes  PT Professional Judgement Used?: Yes  Outcome Measure Options: Tinetti  Score: 25  Functional Limitation: Mobility: Walking and moving around  Mobility: Walking and Moving Around Current Status (): At least 1 percent but less than 20 percent impaired, limited or restricted  Mobility: Walking and Moving Around Goal Status (): 0 percent impaired, limited or restricted      Nash Diallo, PT  3/13/2017

## 2017-03-13 NOTE — PLAN OF CARE
Problem: Patient Care Overview (Adult)  Goal: Plan of Care Review  Outcome: Ongoing (interventions implemented as appropriate)    03/12/17 1629 03/13/17 0503   Coping/Psychosocial Response Interventions   Plan Of Care Reviewed With --  patient   Outcome Evaluation   Outcome Summary/Follow up Plan patient weak, appetite good, vitals stable --        Goal: Adult Individualization and Mutuality  Outcome: Ongoing (interventions implemented as appropriate)  Goal: Discharge Needs Assessment  Outcome: Ongoing (interventions implemented as appropriate)    Problem: Pneumonia (Adult)  Goal: Signs and Symptoms of Listed Potential Problems Will be Absent or Manageable (Pneumonia)  Outcome: Ongoing (interventions implemented as appropriate)    Problem: Infection, Risk/Actual (Adult)  Goal: Infection Prevention/Resolution  Outcome: Ongoing (interventions implemented as appropriate)    Problem: Skin Integrity Impairment, Risk/Actual (Adult)  Goal: Skin Integrity/Wound Healing  Outcome: Ongoing (interventions implemented as appropriate)    Problem: Fall Risk (Adult)  Goal: Absence of Falls  Outcome: Ongoing (interventions implemented as appropriate)

## 2017-03-13 NOTE — SIGNIFICANT NOTE
03/13/17 1422   SLP Discharge Summary   Reason for Discharge At baseline function   Outcomes Achieved Able to achieve all goals within established timeline   Discharge Destination other (comment)  (D/C from SLP due to goals met.)

## 2017-03-13 NOTE — PLAN OF CARE
Problem: Pressure Ulcer Risk (Yogi Scale) (Adult,Obstetrics,Pediatric)  Goal: Identify Related Risk Factors and Signs and Symptoms  Outcome: Outcome(s) achieved Date Met:  03/13/17

## 2017-03-13 NOTE — PLAN OF CARE
Problem: Patient Care Overview (Adult)  Goal: Plan of Care Review  Outcome: Ongoing (interventions implemented as appropriate)    03/13/17 1753   Coping/Psychosocial Response Interventions   Plan Of Care Reviewed With patient   Patient Care Overview   Progress improving   Outcome Evaluation   Outcome Summary/Follow up Plan pt is feeling some better. still evelia boyer meds ordered q6 prn       Goal: Adult Individualization and Mutuality  Outcome: Ongoing (interventions implemented as appropriate)    03/12/17 1629 03/13/17 1753   Individualization   Patient Specific Preferences patient likes apple juice & strawberry ice cream --    Patient Specific Goals --  To get better and go home       Goal: Discharge Needs Assessment  Outcome: Ongoing (interventions implemented as appropriate)    Problem: Pneumonia (Adult)  Goal: Signs and Symptoms of Listed Potential Problems Will be Absent or Manageable (Pneumonia)  Outcome: Ongoing (interventions implemented as appropriate)    Problem: Fall Risk (Adult)  Goal: Absence of Falls  Outcome: Ongoing (interventions implemented as appropriate)    Problem: Pressure Ulcer Risk (Yogi Scale) (Adult,Obstetrics,Pediatric)  Goal: Skin Integrity  Outcome: Ongoing (interventions implemented as appropriate)

## 2017-03-14 PROCEDURE — 94799 UNLISTED PULMONARY SVC/PX: CPT

## 2017-03-14 PROCEDURE — 97116 GAIT TRAINING THERAPY: CPT

## 2017-03-14 PROCEDURE — 25010000002 CEFTRIAXONE: Performed by: INTERNAL MEDICINE

## 2017-03-14 PROCEDURE — 25010000002 METHYLPREDNISOLONE PER 125 MG: Performed by: INTERNAL MEDICINE

## 2017-03-14 PROCEDURE — 94760 N-INVAS EAR/PLS OXIMETRY 1: CPT

## 2017-03-14 RX ORDER — LACTULOSE 10 G/15ML
10 SOLUTION ORAL 3 TIMES DAILY
Status: DISCONTINUED | OUTPATIENT
Start: 2017-03-14 | End: 2017-03-15 | Stop reason: HOSPADM

## 2017-03-14 RX ADMIN — METHYLPREDNISOLONE SODIUM SUCCINATE 60 MG: 125 INJECTION, POWDER, FOR SOLUTION INTRAMUSCULAR; INTRAVENOUS at 11:42

## 2017-03-14 RX ADMIN — CEFTRIAXONE 1 G: 1 INJECTION, POWDER, FOR SOLUTION INTRAMUSCULAR; INTRAVENOUS at 20:51

## 2017-03-14 RX ADMIN — ALBUTEROL SULFATE 2.5 MG: 2.5 SOLUTION RESPIRATORY (INHALATION) at 12:11

## 2017-03-14 RX ADMIN — LORAZEPAM 1 MG: 1 TABLET ORAL at 00:07

## 2017-03-14 RX ADMIN — LORAZEPAM 1 MG: 1 TABLET ORAL at 20:57

## 2017-03-14 RX ADMIN — LACTULOSE 10 G: 10 SOLUTION ORAL at 11:51

## 2017-03-14 RX ADMIN — GABAPENTIN 200 MG: 100 CAPSULE ORAL at 21:00

## 2017-03-14 RX ADMIN — ASPIRIN 81 MG 81 MG: 81 TABLET ORAL at 08:48

## 2017-03-14 RX ADMIN — OXYCODONE HYDROCHLORIDE AND ACETAMINOPHEN 1 TABLET: 10; 325 TABLET ORAL at 10:30

## 2017-03-14 RX ADMIN — ALBUTEROL SULFATE 2.5 MG: 2.5 SOLUTION RESPIRATORY (INHALATION) at 01:20

## 2017-03-14 RX ADMIN — ALBUTEROL SULFATE 2.5 MG: 2.5 SOLUTION RESPIRATORY (INHALATION) at 19:06

## 2017-03-14 RX ADMIN — BUDESONIDE AND FORMOTEROL FUMARATE DIHYDRATE 2 PUFF: 160; 4.5 AEROSOL RESPIRATORY (INHALATION) at 07:55

## 2017-03-14 RX ADMIN — Medication 10 ML: at 08:49

## 2017-03-14 RX ADMIN — OXYCODONE HYDROCHLORIDE AND ACETAMINOPHEN 1 TABLET: 10; 325 TABLET ORAL at 22:03

## 2017-03-14 RX ADMIN — NICOTINE 1 PATCH: 21 PATCH TRANSDERMAL at 13:29

## 2017-03-14 RX ADMIN — GUAIFENESIN AND DEXTROMETHORPHAN 5 ML: 100; 10 SYRUP ORAL at 10:30

## 2017-03-14 RX ADMIN — GABAPENTIN 200 MG: 100 CAPSULE ORAL at 13:29

## 2017-03-14 RX ADMIN — METHYLPREDNISOLONE SODIUM SUCCINATE 60 MG: 125 INJECTION, POWDER, FOR SOLUTION INTRAMUSCULAR; INTRAVENOUS at 05:40

## 2017-03-14 RX ADMIN — OXYCODONE HYDROCHLORIDE AND ACETAMINOPHEN 1 TABLET: 10; 325 TABLET ORAL at 18:19

## 2017-03-14 RX ADMIN — GABAPENTIN 200 MG: 100 CAPSULE ORAL at 05:40

## 2017-03-14 RX ADMIN — ALBUTEROL SULFATE 2.5 MG: 2.5 SOLUTION RESPIRATORY (INHALATION) at 07:55

## 2017-03-14 RX ADMIN — METHYLPREDNISOLONE SODIUM SUCCINATE 60 MG: 125 INJECTION, POWDER, FOR SOLUTION INTRAMUSCULAR; INTRAVENOUS at 18:09

## 2017-03-14 RX ADMIN — OXYCODONE HYDROCHLORIDE AND ACETAMINOPHEN 1 TABLET: 10; 325 TABLET ORAL at 06:36

## 2017-03-14 RX ADMIN — LACTULOSE 10 G: 10 SOLUTION ORAL at 20:52

## 2017-03-14 RX ADMIN — OXYCODONE HYDROCHLORIDE AND ACETAMINOPHEN 1 TABLET: 10; 325 TABLET ORAL at 02:27

## 2017-03-14 RX ADMIN — METHYLPREDNISOLONE SODIUM SUCCINATE 60 MG: 125 INJECTION, POWDER, FOR SOLUTION INTRAMUSCULAR; INTRAVENOUS at 23:15

## 2017-03-14 RX ADMIN — BUDESONIDE AND FORMOTEROL FUMARATE DIHYDRATE 2 PUFF: 160; 4.5 AEROSOL RESPIRATORY (INHALATION) at 19:06

## 2017-03-14 RX ADMIN — LACTULOSE 10 G: 10 SOLUTION ORAL at 18:19

## 2017-03-14 RX ADMIN — TRAZODONE HYDROCHLORIDE 50 MG: 50 TABLET ORAL at 20:52

## 2017-03-14 NOTE — PROGRESS NOTES
Progress Note  Jaya Harrison MD  Hospitalist     LOS: 0 days   Patient Care Team:  GUSTAVO Aguilar as PCP - General (Family Medicine)    Chief Complaint: dyspnea    Subjective     Interval History:     Patient Complaints: cough, congestion, wheezing. Still very weak, needs plenty of assistance, even for minor tasks.    History taken from: patient    Medication Review:   Current Facility-Administered Medications   Medication Dose Route Frequency Provider Last Rate Last Dose   • albuterol (PROVENTIL) nebulizer solution 0.083% 2.5 mg/3mL  2.5 mg Nebulization Q6H - RT Jaya Harrison MD   2.5 mg at 03/14/17 1211   • aspirin chewable tablet 81 mg  81 mg Oral Daily Jaya Harrison MD   81 mg at 03/14/17 0848   • budesonide-formoterol (SYMBICORT) 160-4.5 MCG/ACT inhaler 2 puff  2 puff Inhalation BID - RT Jaya Harrison MD   2 puff at 03/14/17 0755   • cefTRIAXone (ROCEPHIN) 1 g/100 mL 0.9% NS (MBP)  1 g Intravenous Q24H Jaya Harrison MD   Stopped at 03/13/17 2145   • gabapentin (NEURONTIN) capsule 200 mg  200 mg Oral Q8H Jaya Harrison MD   200 mg at 03/14/17 1329   • guaifenesin-dextromethorphan (ROBITUSSIN DM) 100-10 MG/5ML syrup 5 mL  5 mL Oral Q6H PRN Jaya Harrison MD   5 mL at 03/14/17 1030   • ibuprofen (ADVIL,MOTRIN) tablet 400 mg  400 mg Oral Q6H PRN Jaya Harrison MD       • influenza vac split quad (FLUZONE QUADRIVALENT) IM suspension 0.5 mL  0.5 mL Intramuscular During Hospitalization Jaya Harrison MD       • lactulose (CHRONULAC) 10 GM/15ML solution 10 g  10 g Oral TID Jaya Harrison MD   10 g at 03/14/17 1151   • LORazepam (ATIVAN) tablet 1 mg  1 mg Oral Q6H PRN Jaya Harrison MD   1 mg at 03/14/17 0007   • methylPREDNISolone sodium succinate (SOLU-Medrol) injection 60 mg  60 mg Intravenous Q6H Jaya Harrison MD   60 mg at 03/14/17 1142   • nicotine (NICODERM CQ) 21 MG/24HR patch 1 patch  1 patch Transdermal Q24H Jaya Harrison MD   1 patch at 03/14/17 1329   • oxyCODONE-acetaminophen (PERCOCET)   MG per tablet 1 tablet  1 tablet Oral Q4H PRN Jaya Harrison MD   1 tablet at 03/14/17 1030   • sodium chloride 0.9 % flush 1-10 mL  1-10 mL Intravenous PRN Jaya Harrison MD   3 mL at 03/13/17 2030   • sodium chloride 0.9 % flush 10 mL  10 mL Intravenous PRN Ritchie Alicea MD   10 mL at 03/14/17 0849   • traZODone (DESYREL) tablet 50 mg  50 mg Oral Nightly Jaya Harrison MD   50 mg at 03/11/17 2247       Review of Systems:   Review of Systems   Constitutional: Positive for fatigue.   Respiratory: Positive for cough, shortness of breath and wheezing.    Cardiovascular: Negative for chest pain, palpitations and leg swelling.   Gastrointestinal: Negative for abdominal pain.   Genitourinary: Negative for frequency and urgency.   Musculoskeletal: Positive for back pain and neck stiffness.   Skin: Negative for pallor.   Neurological: Positive for weakness, light-headedness and numbness.   Psychiatric/Behavioral: Negative for agitation and behavioral problems.   All other systems reviewed and are negative.      Objective     Vital Signs  Temp:  [96.3 °F (35.7 °C)-97.3 °F (36.3 °C)] 96.9 °F (36.1 °C)  Heart Rate:  [] 80  Resp:  [16-20] 20  BP: (128-148)/(75-80) 148/80    Physical Exam:  Physical Exam   Constitutional: He is oriented to person, place, and time. He appears well-developed and well-nourished. He appears cachectic.   HENT:   Head: Normocephalic and atraumatic.   Eyes: EOM are normal.   Neck: Neck supple.   Cardiovascular: Normal rate and regular rhythm.    Pulmonary/Chest: Effort normal. He has wheezes.   Abdominal: Soft. Bowel sounds are normal.   Musculoskeletal: Normal range of motion. He exhibits no edema or tenderness.   Neurological: He is alert and oriented to person, place, and time.   Skin: Skin is warm and dry.   Psychiatric: He has a normal mood and affect. His behavior is normal.   Vitals reviewed.       Results Review:    Lab Results (last 24 hours)     Procedure Component Value Units  Date/Time    Urinalysis [[id number]]  (Abnormal) Collected:  03/13/17 1736    Specimen:  Urine from Urine, Clean Catch Updated:  03/13/17 1810     Color, UA Yellow      Appearance, UA Turbid (A)      pH, UA 7.5      Specific Gravity, UA 1.026      Glucose,  mg/dL (2+) (A)      Ketones, UA Negative      Bilirubin, UA Negative      Blood, UA Negative      Protein, UA Negative      Leuk Esterase, UA Negative      Nitrite, UA Negative      Urobilinogen, UA 0.2 E.U./dL           Imaging Results (last 24 hours)     ** No results found for the last 24 hours. **          Assessment/Plan     Principal Problem:    Acute on chronic respiratory failure with hypoxia and hypercapnia  Active Problems:    Decompensated COPD with exacerbation (chronic obstructive pulmonary disease)    Pneumonia    Chronic pain    Continue IV steroids, nebs, try PT/OT. Consult LTAC.    Jaya Harrison MD  03/14/17  2:01 PM

## 2017-03-14 NOTE — PROGRESS NOTES
Acute Care - Physical Therapy Treatment Note  [facility]     Patient Name: Héctor Koo  : 1955  MRN: [id number]  Today's Date: 3/14/2017  Onset of Illness/Injury or Date of Surgery Date: 17  Date of Referral to PT: 17  Referring Physician: Dr. Marie    Admit Date: 3/11/2017    Visit Dx:    ICD-10-CM ICD-9-CM   1. Decompensated COPD with exacerbation (chronic obstructive pulmonary disease) J44.1 491.21   2. Pneumonia due to infectious organism, unspecified laterality, unspecified part of lung J18.9 136.9     484.8   3. Hypoxia R09.02 799.02   4. Hypercapnia R06.89 786.09   5. Chronic low back pain without sciatica, unspecified back pain laterality M54.5 724.2    G89.29 338.29   6. Smoker F17.200 305.1   7. Dysphagia, pharyngeal phase R13.13 787.23   8. Impaired physical mobility Z74.09 781.99     Patient Active Problem List   Diagnosis   • Decompensated COPD with exacerbation (chronic obstructive pulmonary disease)   • Chronic pain   • Acute on chronic respiratory failure with hypoxia and hypercapnia   • Pneumonia               Adult Rehabilitation Note       17 0939 17 1134 17 0915    Rehab Assessment/Intervention    Discipline physical therapy assistant  -SS speech language pathologist  -EK physical therapist  -CZ    Document Type therapy note (daily note)  -SS therapy note (daily note)  -EK evaluation  -CZ    Subjective Information agree to therapy;no complaints  -SS no complaints;agree to therapy  -EK agree to therapy;complains of;pain  -CZ    Patient Effort, Rehab Treatment good  -SS good  -EK good  -CZ    Symptoms Noted During/After Treatment shortness of breath  -SS  shortness of breath  -CZ    Precautions/Limitations fall precautions  -SS fall precautions  -EK fall precautions   Low fall risk (Tinetti: ).  -CZ    Precautions/Limitations, Vision  WFL  -EK     Precautions/Limitations, Hearing  WFL  -EK     Equipment Issued to Patient   gait belt  -CZ     "Recorded by [SS] Millie Whaley PTA [EK] Corinne Gonzalez, CCC-SLP [CZ] Nash Diallo, PT    Vital Signs    Pre Systolic BP Rehab 148  -SS  118  -CZ    Pre Treatment Diastolic BP 80  -SS  65  -CZ    Post Systolic BP Rehab 122  -SS  132  -CZ    Post Treatment Diastolic BP 62  -SS  72  -CZ    Pretreatment Heart Rate (beats/min) 87  -SS  89  -CZ    Posttreatment Heart Rate (beats/min) 90  -SS  81  -CZ    Pre SpO2 (%) 98  -SS  94  -CZ    O2 Delivery Pre Treatment supplemental O2  -SS  supplemental O2   2 LPM  -CZ    Intra SpO2 (%) 91  -SS  79   Returned to 93% in 30 sec with 2 LPM supplemental O2.  -CZ    O2 Delivery Intra Treatment supplemental O2  -SS  room air   Patient removed his supplemental O2 to ambulate to bathroom.  -CZ    Post SpO2 (%) 95  -SS  94  -CZ    O2 Delivery Post Treatment supplemental O2  -SS  supplemental O2  -CZ    Pre Patient Position Supine  -SS  Supine  -CZ    Intra Patient Position Standing  -SS  Standing  -CZ    Post Patient Position Supine  -SS  Supine  -CZ    Recovery Time ~30 seconds to recover post gait  -SS      Recorded by [SS] Millie Whaley PTA  [CZ] Nash Diallo, PT    Pain Assessment    Pain Assessment 0-10  -SS 0-10  -EK 0-10  -CZ    Pain Score 6  -SS 6  -EK 8  -CZ    Post Pain Score 6  -SS 6  -EK 6  -CZ    Pain Type Chronic pain  -SS Chronic pain  -EK Chronic pain  -CZ    Pain Location Back  -SS Back  -EK Back  -CZ    Pain Orientation   Mid  -CZ    Pain Intervention(s) Rest   pain meds not due at this time.   -SS  Medication (See MAR)  -CZ    Response to Interventions   --   Patient reports,\"It's easing down.\"  -CZ    Recorded by [SS] Millie Whaley PTA [EK] Corinne Gonzalez, [facility]-SLP [CZ] Nash Diallo, PT    Vision Assessment/Intervention    Visual Impairment   WFL  -CZ    Recorded by   [CZ] Nash Diallo, PT    Cognitive Assessment/Intervention    Current Cognitive/Communication Assessment functional  -SS  functional  -CZ    Orientation " Status oriented x 4  -SS  oriented x 4  -CZ    Follows Commands/Answers Questions 100% of the time  -SS  100% of the time  -CZ    Personal Safety mild impairment  -SS  mild impairment  -CZ    Personal Safety Interventions nonskid shoes/slippers when out of bed;gait belt  -SS  nonskid shoes/slippers when out of bed;gait belt  -CZ    Recorded by [SS] Millie Whaley, PTA  [CZ] Nash Diallo, PT    Dysphagia Other 1    Dysphagia Other 1 Objective  Pt tolerate least restrictive diet with no s/s of aspiration for adequate nutrition and hydration.   -EK     Status: Dysphagia Other 1  Achieved  -EK     Dysphagia Other 1 Progress  100%;without cues  -EK     Comments: Dysphagia Other 1  Goal met with no s/s of aspiration    Pt had  salad with adequate mastication & swallow int.  -EK     Recorded by  [EK] Corinne Gonzalez, CCC-SLP     ROM (Range of Motion)    General ROM no range of motion deficits identified  -SS  no range of motion deficits identified  -CZ    Recorded by [SS] Millie Whaley, PTA  [CZ] Nash Diallo, PT    MMT (Manual Muscle Testing)    General MMT Assessment   no strength deficits identified  -CZ    General MMT Assessment Detail   4+/5 bilateral LEs.  -CZ    Recorded by   [CZ] Nash Diallo, PT    Mobility Assessment/Training    Extremity Weight-Bearing Status   --   WBAT  -CZ    Recorded by   [CZ] Nash Diallo, PT    Bed Mobility, Assessment/Treatment    Bed Mobility, Roll Left, [place]   independent  -CZ    Bed Mobility, Roll Right, [place]   independent  -CZ    Bed Mobility, Scoot/Bridge, [place] independent  -SS  independent  -CZ    Bed Mob, Supine to Sit, [place] independent  -SS  independent  -CZ    Bed Mob, Sit to Supine, [place] independent  -SS  independent  -CZ    Bed Mob, Sidelying to Sit, [place] independent  -SS  independent  -CZ    Bed Mob, Sit to Sidelying, [place] independent  -SS  independent  -CZ    Recorded by [SS] Millie MERCADO  Jovana, FREDI  [CZ] Nash Diallo, PT    Transfer Assessment/Treatment    Transfers, Sit-Stand [place] conditional independence  -SS  conditional independence  -CZ    Transfers, Stand-Sit [place] conditional independence  -SS  conditional independence  -CZ    Transfers, Sit-Stand-Sit, Assist Device rolling walker  -SS  rolling walker  -CZ    Transfer, Impairments   --   Impulsive at times.  -CZ    Recorded by [SS] Millie Whaley, FREDI  [CZ] Nash Diallo, PT    Gait Assessment/Treatment    Gait, [place] Level stand by assist  -SS  contact guard assist  -CZ    Gait, Assistive Device rolling walker  -SS  rolling walker  -CZ    Gait, Distance (Feet) 450  -SS  200  -CZ    Gait, Gait Pattern Analysis   swing-through gait  -CZ    Gait, Impairments --   pt with increased SOA during gait  -SS  --   Shortness of breath.  -CZ    Gait, Comment patient with no LOB but having increased SOA. O2 91% post gait  -SS      Recorded by [SS] Millie Whaley PTA  [CZ] Nash Diallo, PT    Stairs Assessment/Treatment    Number of Stairs 6  -SS      Stairs, Handrail Location right side (ascending)  -SS      Stairs, [place] Level contact guard assist  -SS      Stairs, Technique Used step over step (ascending);step over step (descending)  -      Recorded by [SS] Millie Whaley PTA      Positioning and Restraints    Pre-Treatment Position in bed  -SS in bed  -EK in bed  -CZ    Post Treatment Position bed  -SS bed  -EK bed  -CZ    In Bed supine;call light within reach;encouraged to call for assist  -SS call light within reach;encouraged to call for assist  -EK side lying right;exit alarm on  -CZ    Recorded by [SS] Millie Whaley PTA [EK] Corinne Gonzalez [facility] [CZ] Nash Diallo, PT      User Key  (r) = Recorded By, (t) = Taken By, (c) = Cosigned By    Initials Name Effective Dates    RYLEY Gonzalez CCC-SLP 12/30/16 -      Millie Whaley PTA 10/17/16 -      Nash  KUNAL Diallo, PT 02/17/17 -                 IP PT Goals       03/14/17 0939 03/13/17 0915       Gait Training PT STG    Gait Training Goal PT STG, Date Established  03/13/17  -CZ     Gait Training Goal PT STG, Time to Achieve  by discharge  -CZ     Gait Training Goal PT STG, [place] Level  conditional independence  -CZ     Gait Training Goal PT STG, Assist Device  cane, straight  -CZ     Gait Training Goal PT STG, Distance to Achieve  300  -CZ     Gait Training Goal PT STG, Date Goal Reviewed 03/14/17  -SS 03/13/17  -CZ     Gait Training Goal PT STG, Outcome goal not met  -      Stair Training PT STG    Stair Training Goal PT STG, Date Established  03/13/17  -CZ     Stair Training Goal PT STG, Time to Achieve  by discharge  -CZ     Stair Training Goal PT STG, Number of Steps  12  -CZ     Stair Training Goal PT STG, [place] Level  conditional independence  -CZ     Stair Training Goal PT STG, Assist Device  1 handrail  -CZ     Stair Training Goal PT STG, Date Goal Reviewed 03/14/17  -SS 03/13/17  -CZ     Stair Training Goal PT STG, Outcome goal not met  -      Physical Therapy- 2 LTG    Physical Therapy PT LTG 2, Date Established  03/13/17  -CZ     Physical Therapy PT LTG 2, Activity Type  Tinetti Fall Risk Assessment.  -CZ     Physical Therapy PT LTG 2, Additional Goal  Score 27/28 or low fall risk.  -CZ     Physical Therapy PT LTG 2, Date Goal Reviewed 03/14/17  -SS 03/13/17  -CZ     Physical Therapy PT LTG 2, Outcome goal not met  -        User Key  (r) = Recorded By, (t) = Taken By, (c) = Cosigned By    Initials Name Provider Type     Millie Whaley, PTA Physical Therapy Assistant    DARREN Diallo, PT Physical Therapist          Physical Therapy Education     Title: PT OT SLP Therapies (Active)     Topic: Physical Therapy (Active)     Point: Mobility training (Active)    Learning Progress Summary    Learner Readiness Method Response Comment Documented by Status   Patient Acceptance E,TB,D  NR Pt educated on improtance of use of AD during mobility. Pt also educated on PLB to help recover when feeling SOA. Pt able to teach back but still requires cues to do so during activity.  03/14/17 0939 Active               Point: Precautions (Active)    Learning Progress Summary    Learner Readiness Method Response Comment Documented by Status   Patient Acceptance E NR Discussed results of Tinetti Fall Risk Assessment with patient: 25/28 or low fall risk.  However, patient removes his O2 at times.  Cautioned patient to avoid this as his O2 sats decreased to 79.  03/13/17 1103 Active                      User Key     Initials Effective Dates Name Provider Type Discipline     10/17/16 -  Millie Whaley, PTA Physical Therapy Assistant PT     02/17/17 -  Nash Diallo, PT Physical Therapist PT                    PT Recommendation and Plan  Anticipated Discharge Disposition: home with home health  Planned Therapy Interventions: balance training, gait training, neuromuscular re-education, stair training  PT Frequency: other (see comments) (5-14x/week.)  Plan of Care Review  Plan Of Care Reviewed With: patient  Progress: progress toward functional goals as expected  Outcome Summary/Follow up Plan: Pt tolerated increase in gt this tx. Pt amb SBA x1 with RW. Pt had complaints of increased SOA during ambulation. O2 sats ~90% post gait and pt improved quickly. Pt would continue to benifit skilled PT to progress activity mariusz and improve strength prior to D/C home.           Outcome Measures       03/14/17 0939 03/13/17 0915       How much help from another person do you currently need...    Turning from your back to your side while in flat bed without using bedrails? 4  -SS      Moving from lying on back to sitting on the side of a flat bed without bedrails? 4  -SS      Moving to and from a bed to a chair (including a wheelchair)? 3  -SS      Standing up from a chair using your arms (e.g., wheelchair, bedside  "chair)? 4  -SS      Climbing 3-5 steps with a railing? 3  -SS      To walk in hospital room? 3  -SS      AM-PAC 6 Clicks Score 21  -SS      Tinetti Assessment    Tinetti Assessment  yes  -CZ     Sitting Balance  1  -CZ     Arises  2  -CZ     Attempts to Rise  2  -CZ     Immediate Standing Balance (first 5 sec)  2  -CZ     Standing Balance  2  -CZ     Sternal Nudge (feet close together)  1  -CZ     Eyes Closed (feet close together)  1  -CZ     Turning 360 Degrees- Steps  1  -CZ     Turning 360 Degrees- Steadiness  1  -CZ     Sitting Down  1  -CZ     Tinetti Balance Score  14  -CZ     Gait Initiation (immediate after told \"go\")  1  -CZ     Step Length- Right Swing  1  -CZ     Step Length- Left Swing  1  -CZ     Foot Clearance- Right Foot  1  -CZ     Foot Clearance- Left Foot  1  -CZ     Step Symmetry  1  -CZ     Step Continuity  1  -CZ     Path (excursion)  1  -CZ     Trunk  2  -CZ     Base of Support  1  -CZ     Gait Score  11  -CZ     Tinetti Total Score  25  -CZ     Tinetti Assistive Device  rolling walker  -CZ     Tinetti Assessment Comments  Low fall risk.  -CZ     Functional Assessment    Outcome Measure Options AM-[facility] 6 Clicks Basic Mobility (PT)  -SS Tinetti  -CZ       User Key  (r) = Recorded By, (t) = Taken By, (c) = Cosigned By    Initials Name Provider Type     Millie Whaley PTA Physical Therapy Assistant    DARREN Diallo, PT Physical Therapist           Time Calculation:         PT Charges       03/14/17 0939          Time Calculation    Start Time 0939  -      Stop Time 1008  -      Time Calculation (min) 29 min  -      PT Received On 03/14/17  -      PT Goal Re-Cert Due Date 03/26/17  -      Time Calculation- PT    Total Timed Code Minutes- PT 29 minute(s)  -        User Key  (r) = Recorded By, (t) = Taken By, (c) = Cosigned By    Initials Name Provider Type    ARSEN Whaley PTA Physical Therapy Assistant          Therapy Charges for Today     Code Description Service " Date Service Provider Modifiers Qty    [id number] HC GAIT TRAINING EA 15 MIN 3/14/2017 Millie Whaley, PTA GP 2          PT G-Codes  PT Professional Judgement Used?: Yes  Outcome Measure Options: AM-PAC 6 Clicks Basic Mobility (PT)  Score: 25  Functional Limitation: Mobility: Walking and moving around  Mobility: Walking and Moving Around Current Status (): At least 1 percent but less than 20 percent impaired, limited or restricted  Mobility: Walking and Moving Around Goal Status (): 0 percent impaired, limited or restricted    Millie Whaley PTA  3/14/2017

## 2017-03-14 NOTE — PLAN OF CARE
Problem: Patient Care Overview (Adult)  Goal: Plan of Care Review  Outcome: Ongoing (interventions implemented as appropriate)    03/14/17 0939   Coping/Psychosocial Response Interventions   Plan Of Care Reviewed With patient   Patient Care Overview   Progress progress toward functional goals as expected   Outcome Evaluation   Outcome Summary/Follow up Plan Pt tolerated increase in gt this tx. Pt amb SBA x1 with RW. Pt had complaints of increased SOA during ambulation. O2 sats ~90% post gait and pt improved quickly. Pt would continue to benifit skilled PT to progress activity mariusz and improve strength prior to D/C home.        Goal: Discharge Needs Assessment  Outcome: Ongoing (interventions implemented as appropriate)    03/11/17 2304 03/13/17 0503 03/13/17 0915   Discharge Needs Assessment   Concerns To Be Addressed --  --  --    Concerns Comments --  weight loss --    Self-Care   Equipment Currently Used at Home --  --  cane, straight  (Patient also has RW and W/C but reports he does not use them)   Living Environment   Transportation Available none --  --      03/13/17 1753   Discharge Needs Assessment   Concerns To Be Addressed basic needs concerns   Concerns Comments --    Self-Care   Equipment Currently Used at Home --    Living Environment   Transportation Available --          Problem: Inpatient Physical Therapy  Goal: Gait Training Goal STG- PT  Outcome: Ongoing (interventions implemented as appropriate)    03/13/17 0915 03/14/17 0939   Gait Training PT STG   Gait Training Goal PT STG, Date Established 03/13/17 --    Gait Training Goal PT STG, Time to Achieve by discharge --    Gait Training Goal PT STG, [place] Level conditional independence --    Gait Training Goal PT STG, Assist Device cane, straight --    Gait Training Goal PT STG, Distance to Achieve 300 --    Gait Training Goal PT STG, Date Goal Reviewed --  03/14/17   Gait Training Goal PT STG, Outcome --  goal not met       Goal: Stair Training  Goal STG- PT  Outcome: Ongoing (interventions implemented as appropriate)    03/13/17 0915 03/14/17 0939   Stair Training PT STG   Stair Training Goal PT STG, Date Established 03/13/17 --    Stair Training Goal PT STG, Time to Achieve by discharge --    Stair Training Goal PT STG, Number of Steps 12 --    Stair Training Goal PT STG, [place] Level conditional independence --    Stair Training Goal PT STG, Assist Device 1 handrail --    Stair Training Goal PT STG, Date Goal Reviewed --  03/14/17   Stair Training Goal PT STG, Outcome --  goal not met       Goal: Physical Therapy Goal 2 LTG  Outcome: Ongoing (interventions implemented as appropriate)    03/13/17 0915 03/14/17 0939   Physical Therapy- 2 LTG   Physical Therapy PT LTG 2, Date Established 03/13/17 --    Physical Therapy PT LTG 2, Activity Type Tinetti Fall Risk Assessment. --    Physical Therapy PT LTG 2, Additional Goal Score 27/28 or low fall risk. --    Physical Therapy PT LTG 2, Date Goal Reviewed --  03/14/17   Physical Therapy PT LTG 2, Outcome --  goal not met

## 2017-03-14 NOTE — PLAN OF CARE
Problem: Patient Care Overview (Adult)  Goal: Plan of Care Review  Outcome: Ongoing (interventions implemented as appropriate)  Goal: Adult Individualization and Mutuality  Outcome: Ongoing (interventions implemented as appropriate)  Goal: Discharge Needs Assessment  Outcome: Ongoing (interventions implemented as appropriate)    Problem: Pneumonia (Adult)  Goal: Signs and Symptoms of Listed Potential Problems Will be Absent or Manageable (Pneumonia)  Outcome: Ongoing (interventions implemented as appropriate)    Problem: Fall Risk (Adult)  Goal: Absence of Falls  Outcome: Ongoing (interventions implemented as appropriate)    Problem: Pressure Ulcer Risk (Yogi Scale) (Adult,Obstetrics,Pediatric)  Goal: Skin Integrity  Outcome: Ongoing (interventions implemented as appropriate)

## 2017-03-14 NOTE — PROGRESS NOTES
Discharge Planning Assessment  [facility]     Patient Name: Héctor Koo  MRN: [id number]  Today's Date: 3/14/2017    Admit Date: 3/11/2017          Discharge Needs Assessment       03/14/17 1345    Living Environment    Lives With alone    Living Arrangements apartment    Provides Primary Care For no one    Quality Of Family Relationships other (see comments)   states he has a daughter but she is not very helpful    Able to Return to Prior Living Arrangements yes    Discharge Needs Assessment    Concerns To Be Addressed transportation;basic needs concerns    Concerns Comments Patient does not drive, we walks most places. Information given to patient to apply for [facility] program.    Readmission Within The Last 30 Days no previous admission in last 30 days    Outpatient/Agency/Support Group Needs homecare agency (specify level of care)    Anticipated Changes Related to Illness none    Equipment Currently Used at Home cane, straight;walker, rolling;wheelchair   only uses cane for ambulation    Equipment Needed After Discharge respiratory supplies   patients nebulizer has broken    Discharge Facility/Level Of Care Needs home with home health    Transportation Available family or friend will provide   Information given for [facility]    Current Discharge Risk chronically ill;lack of support system/caregiver    Discharge Disposition home healthcare service    Discharge Planning Comments Patient will need home health and if possible [facility] transportation arranged. I spoke with [facility] and they would like the patient to call them. Number given to patient to call.            Discharge Plan       03/14/17 1534    Case Management/Social Work Plan    Additional Comments Received consult for LTP[facility]. Spoke with Corina from [facility], they will come evaluate in the am. Courtney Mora, PARMJIT      03/14/17 1400    Case Management/Social Work Plan    Plan Home with home health vs [facility]    Additional Comments Discharge will depend on how  patient improves in the next few days. At most will need [facility] at best home with home health.        Discharge Placement     No information found        Expected Discharge Date and Time     Expected Discharge Date Expected Discharge Time    Mar 15, 2017               Demographic Summary       03/14/17 1341    Referral Information    Admission Type inpatient    Referral Source high risk screening    Primary Care Physician Information    Name Corina BrewerGUSTAVO            Functional Status       03/14/17 1342    Functional Status Prior    Ambulation 1-->assistive equipment   walks with cane    Communication 0-->understands/communicates without difficulty    Swallowing 0-->swallows foods/liquids without difficulty    IADL    Medications independent    Meal Preparation independent    Housekeeping independent    Laundry independent    Shopping independent    Oral Care independent    Activity Tolerance    Current Activity Limitations none    Usual Activity Tolerance good    Current Activity Tolerance fair   soa with ambulation    Cognitive/Perceptual/Developmental    Current Mental Status/Cognitive Functioning no deficits noted    Recent Changes in Mental Status/Cognitive Functioning no changes            Psychosocial     None            Abuse/Neglect     None            Legal     None            Substance Abuse     None            Patient Forms     None          Courtney Mora

## 2017-03-15 ENCOUNTER — APPOINTMENT (OUTPATIENT)
Dept: ULTRASOUND IMAGING | Facility: HOSPITAL | Age: 62
End: 2017-03-15

## 2017-03-15 ENCOUNTER — HOSPITAL ENCOUNTER (OUTPATIENT)
Facility: HOSPITAL | Age: 62
Discharge: HOME | End: 2017-03-29
Attending: INTERNAL MEDICINE | Admitting: INTERNAL MEDICINE

## 2017-03-15 ENCOUNTER — APPOINTMENT (OUTPATIENT)
Dept: GENERAL RADIOLOGY | Facility: HOSPITAL | Age: 62
End: 2017-03-15

## 2017-03-15 ENCOUNTER — APPOINTMENT (OUTPATIENT)
Dept: INTERVENTIONAL RADIOLOGY/VASCULAR | Facility: HOSPITAL | Age: 62
End: 2017-03-15

## 2017-03-15 VITALS
HEIGHT: 64 IN | BODY MASS INDEX: 19.52 KG/M2 | OXYGEN SATURATION: 90 % | SYSTOLIC BLOOD PRESSURE: 132 MMHG | RESPIRATION RATE: 20 BRPM | DIASTOLIC BLOOD PRESSURE: 63 MMHG | HEART RATE: 91 BPM | TEMPERATURE: 97.7 F | WEIGHT: 114.3 LBS

## 2017-03-15 DIAGNOSIS — Z74.09 IMPAIRED PHYSICAL MOBILITY: ICD-10-CM

## 2017-03-15 LAB
ALBUMIN SERPL-MCNC: 3.1 G/DL (ref 3.4–4.8)
ALBUMIN/GLOB SERPL: 1 G/DL (ref 1.1–1.8)
ALP SERPL-CCNC: 159 U/L (ref 38–126)
ALT SERPL W P-5'-P-CCNC: 160 U/L (ref 21–72)
ANION GAP SERPL CALCULATED.3IONS-SCNC: 9 MMOL/L (ref 5–15)
AST SERPL-CCNC: 146 U/L (ref 17–59)
BASOPHILS # BLD AUTO: 0.01 10*3/MM3 (ref 0–0.2)
BASOPHILS NFR BLD AUTO: 0.1 % (ref 0–2)
BILIRUB SERPL-MCNC: 0.4 MG/DL (ref 0.2–1.3)
BILIRUB UR QL STRIP: NEGATIVE
BUN BLD-MCNC: 26 MG/DL (ref 7–21)
BUN/CREAT SERPL: 54.2 (ref 7–25)
CALCIUM SPEC-SCNC: 8.6 MG/DL (ref 8.4–10.2)
CHLORIDE SERPL-SCNC: 90 MMOL/L (ref 95–110)
CLARITY UR: CLEAR
CO2 SERPL-SCNC: 33 MMOL/L (ref 22–31)
COLOR UR: YELLOW
CREAT BLD-MCNC: 0.48 MG/DL (ref 0.7–1.3)
DEPRECATED RDW RBC AUTO: 43.9 FL (ref 35.1–43.9)
EOSINOPHIL # BLD AUTO: 0 10*3/MM3 (ref 0–0.7)
EOSINOPHIL NFR BLD AUTO: 0 % (ref 0–7)
ERYTHROCYTE [DISTWIDTH] IN BLOOD BY AUTOMATED COUNT: 13.4 % (ref 11.5–14.5)
GFR SERPL CREATININE-BSD FRML MDRD: 178 ML/MIN/1.73 (ref 56–130)
GLOBULIN UR ELPH-MCNC: 3.1 GM/DL (ref 2.3–3.5)
GLUCOSE BLD-MCNC: 154 MG/DL (ref 60–100)
GLUCOSE UR STRIP-MCNC: ABNORMAL MG/DL
HCT VFR BLD AUTO: 36.1 % (ref 39–49)
HGB BLD-MCNC: 12.2 G/DL (ref 13.7–17.3)
HGB UR QL STRIP.AUTO: NEGATIVE
IMM GRANULOCYTES # BLD: 0.11 10*3/MM3 (ref 0–0.02)
IMM GRANULOCYTES NFR BLD: 0.8 % (ref 0–0.5)
KETONES UR QL STRIP: NEGATIVE
LEUKOCYTE ESTERASE UR QL STRIP.AUTO: NEGATIVE
LYMPHOCYTES # BLD AUTO: 0.94 10*3/MM3 (ref 0.6–4.2)
LYMPHOCYTES NFR BLD AUTO: 7.1 % (ref 10–50)
MCH RBC QN AUTO: 30.3 PG (ref 26.5–34)
MCHC RBC AUTO-ENTMCNC: 33.8 G/DL (ref 31.5–36.3)
MCV RBC AUTO: 89.6 FL (ref 80–98)
MONOCYTES # BLD AUTO: 0.73 10*3/MM3 (ref 0–0.9)
MONOCYTES NFR BLD AUTO: 5.5 % (ref 0–12)
NEUTROPHILS # BLD AUTO: 11.53 10*3/MM3 (ref 2–8.6)
NEUTROPHILS NFR BLD AUTO: 86.5 % (ref 37–80)
NITRITE UR QL STRIP: NEGATIVE
PH UR STRIP.AUTO: 8.5 [PH] (ref 5–9)
PLATELET # BLD AUTO: 379 10*3/MM3 (ref 150–450)
PMV BLD AUTO: 9.1 FL (ref 8–12)
POTASSIUM BLD-SCNC: 4.8 MMOL/L (ref 3.5–5.1)
PROT SERPL-MCNC: 6.2 G/DL (ref 6.3–8.6)
PROT UR QL STRIP: ABNORMAL
RBC # BLD AUTO: 4.03 10*6/MM3 (ref 4.37–5.74)
SODIUM BLD-SCNC: 132 MMOL/L (ref 137–145)
SP GR UR STRIP: 1.03 (ref 1–1.03)
UROBILINOGEN UR QL STRIP: ABNORMAL
WBC NRBC COR # BLD: 13.32 10*3/MM3 (ref 3.2–9.8)

## 2017-03-15 PROCEDURE — 25010000002 METHYLPREDNISOLONE PER 125 MG: Performed by: INTERNAL MEDICINE

## 2017-03-15 PROCEDURE — 25010000002 CEFTRIAXONE: Performed by: INTERNAL MEDICINE

## 2017-03-15 PROCEDURE — 81003 URINALYSIS AUTO W/O SCOPE: CPT | Performed by: INTERNAL MEDICINE

## 2017-03-15 PROCEDURE — 76937 US GUIDE VASCULAR ACCESS: CPT

## 2017-03-15 PROCEDURE — 87086 URINE CULTURE/COLONY COUNT: CPT | Performed by: INTERNAL MEDICINE

## 2017-03-15 PROCEDURE — 02HV33Z INSERTION OF INFUSION DEVICE INTO SUPERIOR VENA CAVA, PERCUTANEOUS APPROACH: ICD-10-PCS | Performed by: INTERNAL MEDICINE

## 2017-03-15 PROCEDURE — C1751 CATH, INF, PER/CENT/MIDLINE: HCPCS

## 2017-03-15 PROCEDURE — 87040 BLOOD CULTURE FOR BACTERIA: CPT | Performed by: INTERNAL MEDICINE

## 2017-03-15 PROCEDURE — 85025 COMPLETE CBC W/AUTO DIFF WBC: CPT | Performed by: INTERNAL MEDICINE

## 2017-03-15 PROCEDURE — B548ZZA ULTRASONOGRAPHY OF SUPERIOR VENA CAVA, GUIDANCE: ICD-10-PCS | Performed by: INTERNAL MEDICINE

## 2017-03-15 PROCEDURE — 80053 COMPREHEN METABOLIC PANEL: CPT | Performed by: INTERNAL MEDICINE

## 2017-03-15 PROCEDURE — 94799 UNLISTED PULMONARY SVC/PX: CPT

## 2017-03-15 RX ORDER — OXYCODONE AND ACETAMINOPHEN 10; 325 MG/1; MG/1
1 TABLET ORAL EVERY 4 HOURS PRN
Status: DISCONTINUED | OUTPATIENT
Start: 2017-03-15 | End: 2017-03-29 | Stop reason: HOSPADM

## 2017-03-15 RX ORDER — LACTULOSE 10 G/15ML
10 SOLUTION ORAL 3 TIMES DAILY
Status: DISCONTINUED | OUTPATIENT
Start: 2017-03-15 | End: 2017-03-29 | Stop reason: HOSPADM

## 2017-03-15 RX ORDER — METHYLPREDNISOLONE SODIUM SUCCINATE 125 MG/2ML
60 INJECTION, POWDER, LYOPHILIZED, FOR SOLUTION INTRAMUSCULAR; INTRAVENOUS EVERY 6 HOURS
Qty: 1 VIAL | Refills: 0 | Status: SHIPPED | OUTPATIENT
Start: 2017-03-15 | End: 2017-11-03 | Stop reason: HOSPADM

## 2017-03-15 RX ORDER — GUAIFENESIN/DEXTROMETHORPHAN 100-10MG/5
5 SYRUP ORAL 4 TIMES DAILY PRN
Qty: 10 ML | Refills: 1 | Status: SHIPPED | OUTPATIENT
Start: 2017-03-15 | End: 2017-11-03 | Stop reason: HOSPADM

## 2017-03-15 RX ORDER — GABAPENTIN 100 MG/1
200 CAPSULE ORAL EVERY 8 HOURS SCHEDULED
Status: DISCONTINUED | OUTPATIENT
Start: 2017-03-15 | End: 2017-03-29 | Stop reason: HOSPADM

## 2017-03-15 RX ORDER — SODIUM CHLORIDE 9 MG/ML
INJECTION, SOLUTION INTRAVENOUS
Status: DISPENSED
Start: 2017-03-15 | End: 2017-03-16

## 2017-03-15 RX ORDER — SODIUM CHLORIDE 0.9 % (FLUSH) 0.9 %
10 SYRINGE (ML) INJECTION EVERY 12 HOURS SCHEDULED
Status: DISCONTINUED | OUTPATIENT
Start: 2017-03-15 | End: 2017-03-29 | Stop reason: HOSPADM

## 2017-03-15 RX ORDER — NICOTINE 21 MG/24HR
1 PATCH, TRANSDERMAL 24 HOURS TRANSDERMAL EVERY 24 HOURS
Qty: 30 PATCH | Refills: 1 | Status: SHIPPED | OUTPATIENT
Start: 2017-03-15 | End: 2017-11-03 | Stop reason: HOSPADM

## 2017-03-15 RX ORDER — BUDESONIDE AND FORMOTEROL FUMARATE DIHYDRATE 160; 4.5 UG/1; UG/1
2 AEROSOL RESPIRATORY (INHALATION)
Qty: 1 INHALER | Refills: 0 | Status: SHIPPED | OUTPATIENT
Start: 2017-03-15 | End: 2017-11-03 | Stop reason: HOSPADM

## 2017-03-15 RX ORDER — METHYLPREDNISOLONE SODIUM SUCCINATE 125 MG/2ML
60 INJECTION, POWDER, LYOPHILIZED, FOR SOLUTION INTRAMUSCULAR; INTRAVENOUS EVERY 6 HOURS
Status: DISCONTINUED | OUTPATIENT
Start: 2017-03-15 | End: 2017-03-17 | Stop reason: SDUPTHER

## 2017-03-15 RX ORDER — SODIUM CHLORIDE 0.9 % (FLUSH) 0.9 %
10 SYRINGE (ML) INJECTION AS NEEDED
Qty: 10 ML | Refills: 12 | Status: SHIPPED | OUTPATIENT
Start: 2017-03-15 | End: 2017-11-03 | Stop reason: HOSPADM

## 2017-03-15 RX ORDER — NICOTINE 21 MG/24HR
1 PATCH, TRANSDERMAL 24 HOURS TRANSDERMAL EVERY 24 HOURS
Status: DISCONTINUED | OUTPATIENT
Start: 2017-03-16 | End: 2017-03-29 | Stop reason: HOSPADM

## 2017-03-15 RX ORDER — LACTULOSE 10 G/15ML
10 SOLUTION ORAL 3 TIMES DAILY
Qty: 500 ML | Refills: 1 | Status: SHIPPED | OUTPATIENT
Start: 2017-03-15 | End: 2017-11-03 | Stop reason: HOSPADM

## 2017-03-15 RX ORDER — OXYCODONE AND ACETAMINOPHEN 10; 325 MG/1; MG/1
1 TABLET ORAL EVERY 4 HOURS PRN
Qty: 30 TABLET | Refills: 0 | Status: SHIPPED | OUTPATIENT
Start: 2017-03-15

## 2017-03-15 RX ORDER — GUAIFENESIN/DEXTROMETHORPHAN 100-10MG/5
5 SYRUP ORAL EVERY 6 HOURS PRN
Status: DISCONTINUED | OUTPATIENT
Start: 2017-03-15 | End: 2017-03-29 | Stop reason: HOSPADM

## 2017-03-15 RX ORDER — TRAZODONE HYDROCHLORIDE 50 MG/1
50 TABLET ORAL NIGHTLY
Status: DISCONTINUED | OUTPATIENT
Start: 2017-03-15 | End: 2017-03-29 | Stop reason: HOSPADM

## 2017-03-15 RX ORDER — LORAZEPAM 1 MG/1
1 TABLET ORAL EVERY 6 HOURS PRN
Status: DISCONTINUED | OUTPATIENT
Start: 2017-03-15 | End: 2017-03-29 | Stop reason: HOSPADM

## 2017-03-15 RX ORDER — ASPIRIN 81 MG/1
81 TABLET, CHEWABLE ORAL DAILY
Qty: 30 TABLET | Refills: 0 | Status: SHIPPED | OUTPATIENT
Start: 2017-03-15 | End: 2017-11-03 | Stop reason: HOSPADM

## 2017-03-15 RX ORDER — BUDESONIDE AND FORMOTEROL FUMARATE DIHYDRATE 160; 4.5 UG/1; UG/1
2 AEROSOL RESPIRATORY (INHALATION)
Status: DISCONTINUED | OUTPATIENT
Start: 2017-03-15 | End: 2017-03-29 | Stop reason: HOSPADM

## 2017-03-15 RX ORDER — ASPIRIN 81 MG/1
81 TABLET, CHEWABLE ORAL DAILY
Status: DISCONTINUED | OUTPATIENT
Start: 2017-03-16 | End: 2017-03-29 | Stop reason: HOSPADM

## 2017-03-15 RX ORDER — LORAZEPAM 1 MG/1
1 TABLET ORAL EVERY 6 HOURS PRN
Qty: 30 TABLET | Refills: 0 | Status: SHIPPED | OUTPATIENT
Start: 2017-03-15 | End: 2017-11-03 | Stop reason: HOSPADM

## 2017-03-15 RX ORDER — SODIUM CHLORIDE 0.9 % (FLUSH) 0.9 %
10 SYRINGE (ML) INJECTION AS NEEDED
Status: DISCONTINUED | OUTPATIENT
Start: 2017-03-15 | End: 2017-03-29 | Stop reason: HOSPADM

## 2017-03-15 RX ORDER — SODIUM CHLORIDE 0.9 % (FLUSH) 0.9 %
1-10 SYRINGE (ML) INJECTION AS NEEDED
Qty: 10 ML | Refills: 12 | Status: SHIPPED | OUTPATIENT
Start: 2017-03-15 | End: 2017-11-03 | Stop reason: HOSPADM

## 2017-03-15 RX ORDER — IBUPROFEN 400 MG/1
400 TABLET ORAL EVERY 6 HOURS PRN
Status: DISCONTINUED | OUTPATIENT
Start: 2017-03-15 | End: 2017-03-29 | Stop reason: HOSPADM

## 2017-03-15 RX ORDER — ALBUTEROL SULFATE 2.5 MG/3ML
2.5 SOLUTION RESPIRATORY (INHALATION)
Qty: 120 VIAL | Refills: 1 | Status: SHIPPED | OUTPATIENT
Start: 2017-03-15

## 2017-03-15 RX ORDER — ALBUTEROL SULFATE 2.5 MG/3ML
2.5 SOLUTION RESPIRATORY (INHALATION)
Status: DISCONTINUED | OUTPATIENT
Start: 2017-03-15 | End: 2017-03-29 | Stop reason: HOSPADM

## 2017-03-15 RX ADMIN — OXYCODONE HYDROCHLORIDE AND ACETAMINOPHEN 1 TABLET: 10; 325 TABLET ORAL at 06:53

## 2017-03-15 RX ADMIN — GABAPENTIN 200 MG: 100 CAPSULE ORAL at 13:47

## 2017-03-15 RX ADMIN — METHYLPREDNISOLONE SODIUM SUCCINATE 60 MG: 125 INJECTION, POWDER, FOR SOLUTION INTRAMUSCULAR; INTRAVENOUS at 11:07

## 2017-03-15 RX ADMIN — Medication 10 ML: at 08:48

## 2017-03-15 RX ADMIN — OXYCODONE HYDROCHLORIDE AND ACETAMINOPHEN 1 TABLET: 10; 325 TABLET ORAL at 03:10

## 2017-03-15 RX ADMIN — ALBUTEROL SULFATE 2.5 MG: 2.5 SOLUTION RESPIRATORY (INHALATION) at 13:03

## 2017-03-15 RX ADMIN — GABAPENTIN 200 MG: 100 CAPSULE ORAL at 06:53

## 2017-03-15 RX ADMIN — LACTULOSE 10 G: 10 SOLUTION ORAL at 08:46

## 2017-03-15 RX ADMIN — METHYLPREDNISOLONE SODIUM SUCCINATE 60 MG: 125 INJECTION, POWDER, FOR SOLUTION INTRAMUSCULAR; INTRAVENOUS at 06:53

## 2017-03-15 RX ADMIN — ASPIRIN 81 MG 81 MG: 81 TABLET ORAL at 08:46

## 2017-03-15 RX ADMIN — NICOTINE 1 PATCH: 21 PATCH TRANSDERMAL at 13:47

## 2017-03-15 RX ADMIN — OXYCODONE HYDROCHLORIDE AND ACETAMINOPHEN 1 TABLET: 10; 325 TABLET ORAL at 15:52

## 2017-03-15 RX ADMIN — OXYCODONE HYDROCHLORIDE AND ACETAMINOPHEN 1 TABLET: 10; 325 TABLET ORAL at 11:07

## 2017-03-15 NOTE — PROGRESS NOTES
Malnutrition Severity Assessment    Patient Name:  Héctor Koo  YOB: 1955  MRN: [id number]  Admit Date:  3/11/2017    Patient meets criteria for : Moderate malnutrition    Comments:      Moderate malnutrition related to food security issues combined with COPD evidenced by muscle wasting, fat loss, and 88% IBW.  Pt was admitted w/BMI <15 according to admission wt but has had wt gain since admit.      Malnutrition Type: Social/Environmental Circumstance Malnutrition     Malnutrition Type (last 8 hours)      Malnutrition Severity Assessment       03/15/17 1429    Physical Signs of Malnutrition (Social/Environmental)    Muscle Wasting Mild    Fat Loss Mild      03/15/17 1429    Malnutrition Severity Assessment    Malnutrition Type Social/Environmental Circumstance Malnutrition          Physical Signs of Malnutrition         Most Recent Value    Muscle Wasting  Mild    Fat Loss  Mild      Weight Status         Most Recent Value    BMI  -- [Wt up since admit, admission BMI <15]    %IBW  MIld (<90%)              Electronically signed by:  Melany García RD  03/15/17 2:35 PM

## 2017-03-15 NOTE — DISCHARGE SUMMARY
Discharge Summary  Jaya Harrison MD  Hospitalist     Date of Discharge:  3/15/2017    Discharge Diagnosis:    Principal Problem:    Acute on chronic respiratory failure with hypoxia and hypercapnia  Active Problems:    Decompensated COPD with exacerbation (chronic obstructive pulmonary disease)    Pneumonia    Chronic pain      Presenting Problem/History of Present Illness    Dyspnea / cough / weakness    Hospital Course  Patient is a 61 y.o. male presented for dyspnea / cough / weakness. He improved to some extent with the help of Oxygen, neb treatments, IV steroids and IV antibiotics.      Procedures Performed      Consults:   Consults     Date and Time Order Name Status Description    3/11/2017 2053 Hospitalist (on-call MD unless specified)            Pertinent Test Results: radiology: X-Ray: bilateral pneumonia / pulmonary fibrosis     Condition on Discharge:  stable    Vital Signs  Temp:  [97.1 °F (36.2 °C)-98.5 °F (36.9 °C)] 97.7 °F (36.5 °C)  Heart Rate:  [] 91  Resp:  [18-20] 20  BP: (119-138)/(63-79) 132/63    Physical Exam:  Physical Exam   Constitutional: He appears cachectic.   HENT:   Head: Normocephalic and atraumatic.   Eyes: EOM are normal.   Neck: Neck supple.   Cardiovascular: Normal rate and regular rhythm.    Pulmonary/Chest: Effort normal. He has wheezes. He exhibits no tenderness.   Abdominal: Soft. Bowel sounds are normal. He exhibits no distension. There is no tenderness. There is no guarding.   Musculoskeletal: Normal range of motion. He exhibits no edema or deformity.   Neurological: He is alert.   Skin: Skin is warm and dry.   Psychiatric: He has a normal mood and affect. His behavior is normal.   Vitals reviewed.      Discharge Disposition  Rehab Facility or Unit (DC - External)    Discharge Medications   Héctor Koo   Home Medication Instructions [place]:[id number]    Printed on:03/15/17 0369   Medication Information                      albuterol (PROVENTIL) (2.5 MG/3ML)  0.083% nebulizer solution  Take 2.5 mg by nebulization 4 (Four) Times a Day.             aspirin 81 MG chewable tablet  Chew 1 tablet Daily.             budesonide-formoterol (SYMBICORT) 160-4.5 MCG/ACT inhaler  Inhale 2 puffs 2 (Two) Times a Day.             cefTRIAXone (ROCEPHIN) 1 g/100 mL 0.9% NS (MBP)  Infuse 100 mL into a venous catheter Daily. Indications: Pneumonia             gabapentin (NEURONTIN) 600 MG tablet  Take 600 mg by mouth 3 (Three) Times a Day.             guaifenesin-dextromethorphan (ROBITUSSIN DM) 100-10 MG/5ML syrup  Take 5 mL by mouth 4 (Four) Times a Day As Needed for Cough.             lactulose (CHRONULAC) 10 GM/15ML solution  Take 15 mL by mouth 3 (Three) Times a Day.             LORazepam (ATIVAN) 1 MG tablet  Take 1 tablet by mouth Every 6 (Six) Hours As Needed for Anxiety.             methylPREDNISolone sodium succinate (SOLU-Medrol) 125 MG injection  Infuse 0.96 mL into a venous catheter Every 6 (Six) Hours.             nicotine (NICODERM CQ) 21 MG/24HR patch  Place 1 patch on the skin Daily.             oxyCODONE-acetaminophen (PERCOCET)  MG per tablet  Take 1 tablet by mouth Every 4 (Four) Hours As Needed for Moderate Pain (4-6).             sodium chloride 0.9 % flush  Infuse 1-10 mL into a venous catheter As Needed for Line Care.             sodium chloride 0.9 % flush  Infuse 10 mL into a venous catheter As Needed for Line Care.                 Discharge Diet:   Diet Instructions     Advance Diet As Tolerated                     Activity at Discharge:   Activity Instructions     Activity as Tolerated                   He will be discharged to the 4E / LTAC unit for 7 more days of IV antibiotics, nebulized treatments, PT/OT         Jaya Harrison MD  03/15/17  3:17 PM

## 2017-03-15 NOTE — SIGNIFICANT NOTE
03/15/17 1540   Rehab Treatment   Discipline occupational therapist   Rehab Evaluation   Evaluation Not Performed other (see comments)  (Chart reviewed. Pt off the floor for PICC & being discharged to LTAC. OT eval not completed.)

## 2017-03-15 NOTE — CONSULTS
Adult Nutrition  Assessment    Patient Name:  Héctor Koo  YOB: 1955  MRN: [id number]  Admit Date:  3/11/2017    Assessment Date:  3/15/2017          Reason for Assessment       03/15/17 1426    Reason for Assessment    Reason For Assessment/Visit follow up protocol              Nutrition/Diet History       03/15/17 1426    Nutrition/Diet History    Typical Food/Fluid Intake Pt reports appetite is improving some but complains of being sick to his stomach after he eats.  Pt likes Ensure but cannot afford at home.              Labs/Tests/Procedures/Meds       03/15/17 1427    Labs/Tests/Procedures/Meds    Labs/Tests Review Reviewed    Medication Review Reviewed, pertinent            Physical Findings       03/15/17 1427    Physical Appearance    Overall Physical Appearance generalized wasting;loss of subcutaneous fat;loss of muscle mass;underweight              Nutrition Prescription Ordered       03/15/17 1428    Nutrition Prescription PO    Current PO Diet Regular    Supplement Ensure Enlive    Supplement Frequency 3 times a day            Evaluation of Received Nutrient/Fluid Intake       03/15/17 1429    PO Evaluation    Number of Days PO Intake Evaluated 1 day    Number of Meals 3    % PO Intake               Malnutrition Severity Assessment       03/15/17 1429    Malnutrition Severity Assessment    Malnutrition Type Social/Environmental Circumstance Malnutrition    Physical Signs of Malnutrition (Social/Environmental)    Muscle Wasting Mild    Fat Loss Mild    Weight Status (Social/Environmental)    BMI --   Wt up since admit, admission BMI <15    %IBW MIld (<90%)    Criteria Met (Must meet criteria for severity in at least 2 of these categories: M Wasting, Fat Loss, Fluid, Secondary Signs, Wt. Status, Intake)    Patient meets criteria for  Moderate malnutrition        Comments:      Pt reports appetite is improving some but complains of nausea after he eats.  RN reports pt has  been eating very well and requesting multiple snacks.  Pt likes Ensure but cannot afford it at home he says.  Declined recommendation of [employer] [employer] Breakfast stating that he could not afford that either.  Wt up since admit.  Case management reports pt does not have a form of transportation at home.  Plan is for d/c to [facility].  RD will work with case management to improve food security prior to d/c home.        Electronically signed by:  Melany García RD  03/15/17 2:32 PM

## 2017-03-15 NOTE — SIGNIFICANT NOTE
03/15/17 1505   Rehab Treatment   Discipline physical therapy assistant   Treatment Not Performed patient unavailable for treatment  (Pt off floor for testing.)   Recommendation   PT - Next Appointment 03/16/17

## 2017-03-15 NOTE — PLAN OF CARE
Problem: Patient Care Overview (Adult)  Goal: Plan of Care Review  Outcome: Ongoing (interventions implemented as appropriate)    03/15/17 0523   Coping/Psychosocial Response Interventions   Plan Of Care Reviewed With patient   Patient Care Overview   Progress improving   Outcome Evaluation   Outcome Summary/Follow up Plan PT c/o pain, received prn pain meds. Pt request food throughout night.        Goal: Adult Individualization and Mutuality  Outcome: Ongoing (interventions implemented as appropriate)  Goal: Discharge Needs Assessment  Outcome: Ongoing (interventions implemented as appropriate)    Problem: Pneumonia (Adult)  Goal: Signs and Symptoms of Listed Potential Problems Will be Absent or Manageable (Pneumonia)  Outcome: Ongoing (interventions implemented as appropriate)    Problem: Fall Risk (Adult)  Goal: Absence of Falls  Outcome: Ongoing (interventions implemented as appropriate)    Problem: Pressure Ulcer Risk (Yogi Scale) (Adult,Obstetrics,Pediatric)  Goal: Skin Integrity  Outcome: Ongoing (interventions implemented as appropriate)

## 2017-03-15 NOTE — PROGRESS NOTES
TWO PATIENT IDENTIFIERS WERE USED. CONSENT WAS SIGNED PER PATIENT EDUCATION MATERIAL WAS GIVEN TO PATIENT AND / OR FAMILY. THE PATIENT WAS DRAPED WITH FULL BODY DRAPE AND PATIENT'SRIGHT   ARM WAS PREPPED WITH CHLORAPREP.  ULTRASOUND WAS USED TO LOCALIZE THERIGHT BASILIC VEIN. SUBCUTANEOUS TISSUE AT THE CATHETER SITE WAS INFILTRATED WITH 2% LIDOCAINE. UNDER ULTRASOUND GUIDANCE, THE VEIN WAS ACCESSED WITH A 21GAUGE  NEEDLE. AN 0.018 WIRE WAS THEN THREADED THROUGH THE NEEDLE INTO THE CENTRAL VENOUS SYSTEM. THE 21GAUGE  NEEDLE WAS REMOVED AND A 6 FRENCHPEEL AWAY SHEATH WAS PLACED OVER THE WIRE. THE PICC LINE CATHETER WAS CUT AT 37 CM. THE PICC LINE CATHETER WAS THEN PLACED OVER THE WIRE INTO THE VEIN, THE SHEATH WAS PEELED AWAY,WIRE WAS REMOVED. CATHETER WAS FLUSHED WITH NORMAL SALINE AND TIPS APPLIED. BIOPATCH PLACED. CATHETER SECURED WITHSTATLOCK  AND TEGADERM. PATIENT TOLERATED PROCEDURE WELL. THIS WAS DONE IN   ANGIOSUITE      IMPRESSION: SUCCESSFUL PLACEMENT OF TRIPLE LUMEN PICC        Crys Winn  3/15/2017  3:25 PM

## 2017-03-16 PROCEDURE — 87070 CULTURE OTHR SPECIMN AEROBIC: CPT | Performed by: INTERNAL MEDICINE

## 2017-03-16 PROCEDURE — 87205 SMEAR GRAM STAIN: CPT | Performed by: INTERNAL MEDICINE

## 2017-03-16 PROCEDURE — 25010000002 CEFTRIAXONE: Performed by: INTERNAL MEDICINE

## 2017-03-16 PROCEDURE — 97162 PT EVAL MOD COMPLEX 30 MIN: CPT

## 2017-03-16 PROCEDURE — 25010000002 METHYLPREDNISOLONE PER 125 MG: Performed by: INTERNAL MEDICINE

## 2017-03-16 PROCEDURE — 97116 GAIT TRAINING THERAPY: CPT

## 2017-03-16 RX ORDER — ONDANSETRON 2 MG/ML
4 INJECTION INTRAMUSCULAR; INTRAVENOUS EVERY 6 HOURS PRN
Status: DISCONTINUED | OUTPATIENT
Start: 2017-03-16 | End: 2017-03-29 | Stop reason: HOSPADM

## 2017-03-16 RX ORDER — BISACODYL 10 MG
10 SUPPOSITORY, RECTAL RECTAL DAILY PRN
Status: DISCONTINUED | OUTPATIENT
Start: 2017-03-16 | End: 2017-03-29 | Stop reason: HOSPADM

## 2017-03-16 NOTE — THERAPY DISCHARGE NOTE
Acute Care - Physical Therapy Discharge Summary  [facility]       Patient Name: Héctor Koo  : 1955  MRN: [id number]    Today's Date: 3/16/2017  Onset of Illness/Injury or Date of Surgery Date: 17    Date of Referral to PT: 17  Referring Physician: Dr. Marie      Admit Date: 3/11/2017      PT Recommendation and Plan    Visit Dx:    ICD-10-CM ICD-9-CM   1. Decompensated COPD with exacerbation (chronic obstructive pulmonary disease) J44.1 491.21   2. Pneumonia due to infectious organism, unspecified laterality, unspecified part of lung J18.9 136.9     484.8   3. Hypoxia R09.02 799.02   4. Hypercapnia R06.89 786.09   5. Chronic low back pain without sciatica, unspecified back pain laterality M54.5 724.2    G89.29 338.29   6. Smoker F17.200 305.1   7. Dysphagia, pharyngeal phase R13.13 787.23   8. Impaired physical mobility Z74.09 781.99             Outcome Measures       17 0939          How much help from another person do you currently need...    Turning from your back to your side while in flat bed without using bedrails? 4  -SS      Moving from lying on back to sitting on the side of a flat bed without bedrails? 4  -SS      Moving to and from a bed to a chair (including a wheelchair)? 3  -SS      Standing up from a chair using your arms (e.g., wheelchair, bedside chair)? 4  -SS      Climbing 3-5 steps with a railing? 3  -SS      To walk in hospital room? 3  -SS      AM-PAC 6 Clicks Score 21  -SS      Functional Assessment    Outcome Measure Options AM-PAC 6 Clicks Basic Mobility (PT)  -SS        User Key  (r) = Recorded By, (t) = Taken By, (c) = Cosigned By    Initials Name Provider Type    ARSEN Whaley PTA Physical Therapy Assistant                PT Charges       17 1146          Time Calculation    Start Time 1015  -YOLETTE      Stop Time 1045  -YOLETTE      Time Calculation (min) 30 min  -YOLETTE      PT Non-Billable Time (min) 63 min  -YOLETTE      PT Received On 17   -YOLETTE      PT Goal Re-Cert Due Date 03/29/17  -YOLETTE      Time Calculation- PT    Total Timed Code Minutes- PT 15 minute(s)  -YLOETTE        User Key  (r) = Recorded By, (t) = Taken By, (c) = Cosigned By    Initials Name Provider Type    YOLETTE Bernard, PT Physical Therapist                  IP PT Goals       03/16/17 1611 03/14/17 0939 03/13/17 0915    Gait Training PT STG    Gait Training Goal PT STG, Date Established   03/13/17  -CZ    Gait Training Goal PT STG, Time to Achieve   by discharge  -CZ    Gait Training Goal PT STG, [place] Level   conditional independence  -CZ    Gait Training Goal PT STG, Assist Device   cane, straight  -CZ    Gait Training Goal PT STG, Distance to Achieve   300  -CZ    Gait Training Goal PT STG, Date Goal Reviewed 03/16/17  -CB 03/14/17  -SS 03/13/17  -CZ    Gait Training Goal PT STG, Outcome goal not met  -CB goal not met  -SS     Gait Training Goal PT STG, Reason Goal Not Met discharged from facility  -CB      Stair Training PT STG    Stair Training Goal PT STG, Date Established   03/13/17  -CZ    Stair Training Goal PT STG, Time to Achieve   by discharge  -CZ    Stair Training Goal PT STG, Number of Steps   12  -CZ    Stair Training Goal PT STG, [place] Level   conditional independence  -CZ    Stair Training Goal PT STG, Assist Device   1 handrail  -CZ    Stair Training Goal PT STG, Date Goal Reviewed 03/16/17  -CB 03/14/17  -SS 03/13/17  -CZ    Stair Training Goal PT STG, Outcome goal not met  -CB goal not met  -SS     Stair Training Goal PT STG, Reason Goal Not Met discharged from facility  -CB      Physical Therapy- 2 LTG    Physical Therapy PT LTG 2, Date Established   03/13/17  -CZ    Physical Therapy PT LTG 2, Activity Type   Tinetti Fall Risk Assessment.  -CZ    Physical Therapy PT LTG 2, Additional Goal   Score 27/28 or low fall risk.  -CZ    Physical Therapy PT LTG 2, Date Goal Reviewed 03/16/17  -CB 03/14/17  -SS 03/13/17  -CZ    Physical Therapy PT LTG 2, Outcome  goal not met  -CB goal not met  -SS     Physical Therapy PT LTG 2, Reason Goal Not Met discharged from facility  -CB        User Key  (r) = Recorded By, (t) = Taken By, (c) = Cosigned By    Initials Name Provider Type    CB Reba Liu, PT Physical Therapist    SS Millie Whaley, PTA Physical Therapy Assistant    DARREN Diallo, PT Physical Therapist              PT Discharge Summary  Anticipated Discharge Disposition: home with home health  Reason for Discharge: Discharge from facility, Per MD order  Outcomes Achieved: Refer to plan of care for updates on goals achieved  Discharge Destination: [facility]      Reba Liu, PT   3/16/2017

## 2017-03-16 NOTE — PLAN OF CARE
Problem: Inpatient Physical Therapy  Goal: Gait Training Goal STG- PT  Outcome: Unable to achieve outcome(s) by discharge Date Met:  03/16/17 03/13/17 0915 03/16/17 1611   Gait Training PT STG   Gait Training Goal PT STG, Date Established 03/13/17 --    Gait Training Goal PT STG, Time to Achieve by discharge --    Gait Training Goal PT STG, [place] Level conditional independence --    Gait Training Goal PT STG, Assist Device cane, straight --    Gait Training Goal PT STG, Distance to Achieve 300 --    Gait Training Goal PT STG, Date Goal Reviewed --  03/16/17   Gait Training Goal PT STG, Outcome --  goal not met   Gait Training Goal PT STG, Reason Goal Not Met --  discharged from facility       Goal: Stair Training Goal STG- PT  Outcome: Unable to achieve outcome(s) by discharge Date Met:  03/16/17 03/13/17 0915 03/16/17 1611   Stair Training PT STG   Stair Training Goal PT STG, Date Established 03/13/17 --    Stair Training Goal PT STG, Time to Achieve by discharge --    Stair Training Goal PT STG, Number of Steps 12 --    Stair Training Goal PT STG, [place] Level conditional independence --    Stair Training Goal PT STG, Assist Device 1 handrail --    Stair Training Goal PT STG, Date Goal Reviewed --  03/16/17   Stair Training Goal PT STG, Outcome --  goal not met   Stair Training Goal PT STG, Reason Goal Not Met --  discharged from facility       Goal: Physical Therapy Goal 2 LTG  Outcome: Unable to achieve outcome(s) by discharge Date Met:  03/16/17 03/13/17 0915 03/16/17 1611   Physical Therapy- 2 LTG   Physical Therapy PT LTG 2, Date Established 03/13/17 --    Physical Therapy PT LTG 2, Activity Type Tinetti Fall Risk Assessment. --    Physical Therapy PT LTG 2, Additional Goal Score 27/28 or low fall risk. --    Physical Therapy PT LTG 2, Date Goal Reviewed --  03/16/17   Physical Therapy PT LTG 2, Outcome --  goal not met   Physical Therapy PT LTG 2, Reason Goal Not Met --  discharged from  facility

## 2017-03-16 NOTE — SIGNIFICANT NOTE
03/16/17 1101   Rehab Treatment   Discipline occupational therapist   Rehab Evaluation   Evaluation Not Performed patient/family declined evaluation  (Refer to paper chart.)

## 2017-03-17 LAB
ALBUMIN SERPL-MCNC: 2.8 G/DL (ref 3.4–4.8)
ALBUMIN/GLOB SERPL: 0.9 G/DL (ref 1.1–1.8)
ALP SERPL-CCNC: 114 U/L (ref 38–126)
ALT SERPL W P-5'-P-CCNC: 115 U/L (ref 21–72)
ANION GAP SERPL CALCULATED.3IONS-SCNC: 7 MMOL/L (ref 5–15)
AST SERPL-CCNC: 73 U/L (ref 17–59)
BACTERIA SPEC AEROBE CULT: NORMAL
BILIRUB SERPL-MCNC: 0.3 MG/DL (ref 0.2–1.3)
BUN BLD-MCNC: 27 MG/DL (ref 7–21)
BUN/CREAT SERPL: 57.4 (ref 7–25)
CALCIUM SPEC-SCNC: 8 MG/DL (ref 8.4–10.2)
CHLORIDE SERPL-SCNC: 93 MMOL/L (ref 95–110)
CO2 SERPL-SCNC: 37 MMOL/L (ref 22–31)
CREAT BLD-MCNC: 0.47 MG/DL (ref 0.7–1.3)
DEPRECATED RDW RBC AUTO: 46.2 FL (ref 35.1–43.9)
ERYTHROCYTE [DISTWIDTH] IN BLOOD BY AUTOMATED COUNT: 14.2 % (ref 11.5–14.5)
GFR SERPL CREATININE-BSD FRML MDRD: >150 ML/MIN/1.73 (ref 60–130)
GLOBULIN UR ELPH-MCNC: 3 GM/DL (ref 2.3–3.5)
GLUCOSE BLD-MCNC: 159 MG/DL (ref 60–100)
HCT VFR BLD AUTO: 31.3 % (ref 39–49)
HGB BLD-MCNC: 10.5 G/DL (ref 13.7–17.3)
MAGNESIUM SERPL-MCNC: 2.2 MG/DL (ref 1.6–2.3)
MCH RBC QN AUTO: 30.1 PG (ref 26.5–34)
MCHC RBC AUTO-ENTMCNC: 33.5 G/DL (ref 31.5–36.3)
MCV RBC AUTO: 89.7 FL (ref 80–98)
PLATELET # BLD AUTO: 299 10*3/MM3 (ref 150–450)
PMV BLD AUTO: 9.2 FL (ref 8–12)
POTASSIUM BLD-SCNC: 4.6 MMOL/L (ref 3.5–5.1)
PROT SERPL-MCNC: 5.8 G/DL (ref 6.3–8.6)
RBC # BLD AUTO: 3.49 10*6/MM3 (ref 4.37–5.74)
SODIUM BLD-SCNC: 137 MMOL/L (ref 137–145)
WBC NRBC COR # BLD: 14.84 10*3/MM3 (ref 3.2–9.8)

## 2017-03-17 PROCEDURE — 85027 COMPLETE CBC AUTOMATED: CPT | Performed by: INTERNAL MEDICINE

## 2017-03-17 PROCEDURE — 97116 GAIT TRAINING THERAPY: CPT

## 2017-03-17 PROCEDURE — 97530 THERAPEUTIC ACTIVITIES: CPT

## 2017-03-17 PROCEDURE — 83735 ASSAY OF MAGNESIUM: CPT | Performed by: INTERNAL MEDICINE

## 2017-03-17 PROCEDURE — 25010000002 METHYLPREDNISOLONE PER 125 MG: Performed by: INTERNAL MEDICINE

## 2017-03-17 PROCEDURE — 25010000002 ONDANSETRON PER 1 MG: Performed by: INTERNAL MEDICINE

## 2017-03-17 PROCEDURE — 25010000002 METHYLPREDNISOLONE PER 40 MG: Performed by: INTERNAL MEDICINE

## 2017-03-17 PROCEDURE — 80053 COMPREHEN METABOLIC PANEL: CPT | Performed by: INTERNAL MEDICINE

## 2017-03-17 PROCEDURE — 25010000002 CEFTRIAXONE: Performed by: INTERNAL MEDICINE

## 2017-03-17 RX ORDER — METHYLPREDNISOLONE SODIUM SUCCINATE 40 MG/ML
40 INJECTION, POWDER, LYOPHILIZED, FOR SOLUTION INTRAMUSCULAR; INTRAVENOUS EVERY 8 HOURS
Status: DISCONTINUED | OUTPATIENT
Start: 2017-03-17 | End: 2017-03-23 | Stop reason: DRUGHIGH

## 2017-03-18 LAB
BACTERIA SPEC RESP CULT: NORMAL
GRAM STN SPEC: NORMAL

## 2017-03-18 PROCEDURE — 25010000002 METHYLPREDNISOLONE PER 40 MG: Performed by: INTERNAL MEDICINE

## 2017-03-18 PROCEDURE — 25010000002 CEFTRIAXONE: Performed by: INTERNAL MEDICINE

## 2017-03-18 PROCEDURE — 25010000002 ONDANSETRON PER 1 MG: Performed by: INTERNAL MEDICINE

## 2017-03-18 RX ORDER — SODIUM CHLORIDE 9 MG/ML
INJECTION, SOLUTION INTRAVENOUS
Status: DISPENSED
Start: 2017-03-18 | End: 2017-03-19

## 2017-03-19 LAB
ALBUMIN SERPL-MCNC: 3.1 G/DL (ref 3.4–4.8)
ALBUMIN/GLOB SERPL: 1.1 G/DL (ref 1.1–1.8)
ALP SERPL-CCNC: 121 U/L (ref 38–126)
ALT SERPL W P-5'-P-CCNC: 129 U/L (ref 21–72)
ANION GAP SERPL CALCULATED.3IONS-SCNC: 7 MMOL/L (ref 5–15)
AST SERPL-CCNC: 76 U/L (ref 17–59)
BILIRUB SERPL-MCNC: 0.4 MG/DL (ref 0.2–1.3)
BUN BLD-MCNC: 30 MG/DL (ref 7–21)
BUN/CREAT SERPL: 57.7 (ref 7–25)
CALCIUM SPEC-SCNC: 7.7 MG/DL (ref 8.4–10.2)
CHLORIDE SERPL-SCNC: 91 MMOL/L (ref 95–110)
CO2 SERPL-SCNC: 34 MMOL/L (ref 22–31)
CREAT BLD-MCNC: 0.52 MG/DL (ref 0.7–1.3)
DEPRECATED RDW RBC AUTO: 47.2 FL (ref 35.1–43.9)
ERYTHROCYTE [DISTWIDTH] IN BLOOD BY AUTOMATED COUNT: 14.6 % (ref 11.5–14.5)
GFR SERPL CREATININE-BSD FRML MDRD: 163 ML/MIN/1.73 (ref 56–130)
GLOBULIN UR ELPH-MCNC: 2.9 GM/DL (ref 2.3–3.5)
GLUCOSE BLD-MCNC: 260 MG/DL (ref 60–100)
HCT VFR BLD AUTO: 33.3 % (ref 39–49)
HGB BLD-MCNC: 11.1 G/DL (ref 13.7–17.3)
MAGNESIUM SERPL-MCNC: 2.2 MG/DL (ref 1.6–2.3)
MCH RBC QN AUTO: 30 PG (ref 26.5–34)
MCHC RBC AUTO-ENTMCNC: 33.3 G/DL (ref 31.5–36.3)
MCV RBC AUTO: 90 FL (ref 80–98)
PLATELET # BLD AUTO: 297 10*3/MM3 (ref 150–450)
PMV BLD AUTO: 8.6 FL (ref 8–12)
POTASSIUM BLD-SCNC: 5 MMOL/L (ref 3.5–5.1)
PROT SERPL-MCNC: 6 G/DL (ref 6.3–8.6)
RBC # BLD AUTO: 3.7 10*6/MM3 (ref 4.37–5.74)
SODIUM BLD-SCNC: 132 MMOL/L (ref 137–145)
WBC NRBC COR # BLD: 14.25 10*3/MM3 (ref 3.2–9.8)

## 2017-03-19 PROCEDURE — 85027 COMPLETE CBC AUTOMATED: CPT | Performed by: INTERNAL MEDICINE

## 2017-03-19 PROCEDURE — 25010000002 ONDANSETRON PER 1 MG: Performed by: INTERNAL MEDICINE

## 2017-03-19 PROCEDURE — 97530 THERAPEUTIC ACTIVITIES: CPT

## 2017-03-19 PROCEDURE — 97116 GAIT TRAINING THERAPY: CPT

## 2017-03-19 PROCEDURE — 25010000002 CEFTRIAXONE: Performed by: INTERNAL MEDICINE

## 2017-03-19 PROCEDURE — 25010000002 METHYLPREDNISOLONE PER 40 MG: Performed by: INTERNAL MEDICINE

## 2017-03-19 PROCEDURE — 83735 ASSAY OF MAGNESIUM: CPT | Performed by: INTERNAL MEDICINE

## 2017-03-19 PROCEDURE — 80053 COMPREHEN METABOLIC PANEL: CPT | Performed by: INTERNAL MEDICINE

## 2017-03-19 RX ORDER — SODIUM CHLORIDE 9 MG/ML
INJECTION, SOLUTION INTRAVENOUS
Status: DISPENSED
Start: 2017-03-19 | End: 2017-03-20

## 2017-03-20 LAB
BACTERIA SPEC AEROBE CULT: NORMAL
BACTERIA SPEC AEROBE CULT: NORMAL

## 2017-03-20 PROCEDURE — 25010000002 CEFTRIAXONE: Performed by: INTERNAL MEDICINE

## 2017-03-20 PROCEDURE — 97530 THERAPEUTIC ACTIVITIES: CPT

## 2017-03-20 PROCEDURE — 97110 THERAPEUTIC EXERCISES: CPT

## 2017-03-20 PROCEDURE — 25010000002 ONDANSETRON PER 1 MG: Performed by: INTERNAL MEDICINE

## 2017-03-20 PROCEDURE — 25010000002 METHYLPREDNISOLONE PER 40 MG: Performed by: INTERNAL MEDICINE

## 2017-03-20 RX ORDER — SODIUM CHLORIDE 9 MG/ML
INJECTION, SOLUTION INTRAVENOUS
Status: DISPENSED
Start: 2017-03-20 | End: 2017-03-21

## 2017-03-20 NOTE — PROGRESS NOTES
Long Term Care - Physical Therapy Treatment Note  [facility]     Patient Name: Héctor Koo  : 1955  MRN: [id number]  Today's Date: 3/20/2017             Admit Date: 3/15/2017    Visit Dx:    ICD-10-CM ICD-9-CM   1. Impaired physical mobility Z74.09 781.99     Patient Active Problem List   Diagnosis   • Decompensated COPD with exacerbation (chronic obstructive pulmonary disease)   • Chronic pain   • Acute on chronic respiratory failure with hypoxia and hypercapnia   • Pneumonia                   IP PT Goals       17 1611 17 0939 17 0915    Gait Training PT STG    Gait Training Goal PT STG, Date Established   17  -CZ    Gait Training Goal PT STG, Time to Achieve   by discharge  -CZ    Gait Training Goal PT STG, [place] Level   conditional independence  -CZ    Gait Training Goal PT STG, Assist Device   cane, straight  -CZ    Gait Training Goal PT STG, Distance to Achieve   300  -CZ    Gait Training Goal PT STG, Date Goal Reviewed 17  -CB 17  -SS 17  -CZ    Gait Training Goal PT STG, Outcome goal not met  -CB goal not met  -SS     Gait Training Goal PT STG, Reason Goal Not Met discharged from facility  -CB      Stair Training PT STG    Stair Training Goal PT STG, Date Established   17  -CZ    Stair Training Goal PT STG, Time to Achieve   by discharge  -CZ    Stair Training Goal PT STG, Number of Steps   12  -CZ    Stair Training Goal PT STG, [place] Level   conditional independence  -CZ    Stair Training Goal PT STG, Assist Device   1 handrail  -CZ    Stair Training Goal PT STG, Date Goal Reviewed 17  -CB 17  -SS 17  -CZ    Stair Training Goal PT STG, Outcome goal not met  -CB goal not met  -SS     Stair Training Goal PT STG, Reason Goal Not Met discharged from facility  -CB      Physical Therapy- 2 LTG    Physical Therapy PT LTG 2, Date Established   17  -CZ    Physical Therapy PT LTG 2, Activity Type   Tinetti Fall  Risk Assessment.  -CZ    Physical Therapy PT LTG 2, Additional Goal   Score 27/28 or low fall risk.  -CZ    Physical Therapy PT LTG 2, Date Goal Reviewed 03/16/17  -CB 03/14/17  -SS 03/13/17  -CZ    Physical Therapy PT LTG 2, Outcome goal not met  -CB goal not met  -SS     Physical Therapy PT LTG 2, Reason Goal Not Met discharged from facility  -CB        User Key  (r) = Recorded By, (t) = Taken By, (c) = Cosigned By    Initials Name Provider Type    CB Reba Liu, PT Physical Therapist    SS Millie Whaley, PTA Physical Therapy Assistant    CZ Nash Diallo, PT Physical Therapist                  PT Recommendation and Plan  PT Frequency: other (see comments) (4-7 times per week)            Time Calculation:         PT Charges       03/20/17 1149          Time Calculation    Start Time 1110  -TA      Stop Time 1133  -TA      Time Calculation (min) 23 min  -TA        User Key  (r) = Recorded By, (t) = Taken By, (c) = Cosigned By    Initials Name Provider Type    TA Jessenia Cheng PTA Physical Therapy Assistant          Therapy Charges for Today     Code Description Service Date Service Provider Modifiers Qty    65434721154 HC PT THERAPEUTIC ACT EA 15 MIN 3/20/2017 Jessenia Cheng PTA GP 1    49088972560 HC PT THER PROC EA 15 MIN 3/20/2017 Jessenia Cheng PTA GP 1               Jessenia Cheng PTA  3/20/2017

## 2017-03-21 LAB
ALBUMIN SERPL-MCNC: 2.8 G/DL (ref 3.4–4.8)
ALBUMIN/GLOB SERPL: 1.1 G/DL (ref 1.1–1.8)
ALP SERPL-CCNC: 117 U/L (ref 38–126)
ALT SERPL W P-5'-P-CCNC: 132 U/L (ref 21–72)
ANION GAP SERPL CALCULATED.3IONS-SCNC: 4 MMOL/L (ref 5–15)
AST SERPL-CCNC: 64 U/L (ref 17–59)
BILIRUB SERPL-MCNC: 0.4 MG/DL (ref 0.2–1.3)
BUN BLD-MCNC: 31 MG/DL (ref 7–21)
BUN/CREAT SERPL: 56.4 (ref 7–25)
CALCIUM SPEC-SCNC: 7.8 MG/DL (ref 8.4–10.2)
CHLORIDE SERPL-SCNC: 93 MMOL/L (ref 95–110)
CO2 SERPL-SCNC: 36 MMOL/L (ref 22–31)
CREAT BLD-MCNC: 0.55 MG/DL (ref 0.7–1.3)
DEPRECATED RDW RBC AUTO: 47.4 FL (ref 35.1–43.9)
ERYTHROCYTE [DISTWIDTH] IN BLOOD BY AUTOMATED COUNT: 14.7 % (ref 11.5–14.5)
GFR SERPL CREATININE-BSD FRML MDRD: >150 ML/MIN/1.73 (ref 60–130)
GLOBULIN UR ELPH-MCNC: 2.6 GM/DL (ref 2.3–3.5)
GLUCOSE BLD-MCNC: 141 MG/DL (ref 60–100)
HCT VFR BLD AUTO: 31.6 % (ref 39–49)
HGB BLD-MCNC: 10.8 G/DL (ref 13.7–17.3)
MAGNESIUM SERPL-MCNC: 2.1 MG/DL (ref 1.6–2.3)
MCH RBC QN AUTO: 30.4 PG (ref 26.5–34)
MCHC RBC AUTO-ENTMCNC: 34.2 G/DL (ref 31.5–36.3)
MCV RBC AUTO: 89 FL (ref 80–98)
PLATELET # BLD AUTO: 264 10*3/MM3 (ref 150–450)
PMV BLD AUTO: 9.7 FL (ref 8–12)
POTASSIUM BLD-SCNC: 4.7 MMOL/L (ref 3.5–5.1)
PROT SERPL-MCNC: 5.4 G/DL (ref 6.3–8.6)
RBC # BLD AUTO: 3.55 10*6/MM3 (ref 4.37–5.74)
SODIUM BLD-SCNC: 133 MMOL/L (ref 137–145)
WBC NRBC COR # BLD: 16.21 10*3/MM3 (ref 3.2–9.8)

## 2017-03-21 PROCEDURE — 25010000002 ONDANSETRON PER 1 MG: Performed by: INTERNAL MEDICINE

## 2017-03-21 PROCEDURE — 83735 ASSAY OF MAGNESIUM: CPT | Performed by: INTERNAL MEDICINE

## 2017-03-21 PROCEDURE — 25010000002 METHYLPREDNISOLONE PER 40 MG: Performed by: INTERNAL MEDICINE

## 2017-03-21 PROCEDURE — 97116 GAIT TRAINING THERAPY: CPT

## 2017-03-21 PROCEDURE — 85027 COMPLETE CBC AUTOMATED: CPT | Performed by: INTERNAL MEDICINE

## 2017-03-21 PROCEDURE — 97110 THERAPEUTIC EXERCISES: CPT

## 2017-03-21 PROCEDURE — 80053 COMPREHEN METABOLIC PANEL: CPT | Performed by: INTERNAL MEDICINE

## 2017-03-21 PROCEDURE — 25010000002 CEFTRIAXONE: Performed by: INTERNAL MEDICINE

## 2017-03-21 RX ORDER — SUCRALFATE 1 G/1
1 TABLET ORAL
Status: DISCONTINUED | OUTPATIENT
Start: 2017-03-21 | End: 2017-03-29 | Stop reason: HOSPADM

## 2017-03-21 RX ORDER — SODIUM CHLORIDE 9 MG/ML
INJECTION, SOLUTION INTRAVENOUS
Status: DISPENSED
Start: 2017-03-21 | End: 2017-03-22

## 2017-03-21 NOTE — SIGNIFICANT NOTE
03/21/17 1634   PT Discharge Summary   Anticipated Discharge Disposition home   Reason for Discharge All goals achieved   Outcomes Achieved Able to achieve all goals within established timeline

## 2017-03-22 PROCEDURE — 25010000002 METHYLPREDNISOLONE PER 40 MG: Performed by: INTERNAL MEDICINE

## 2017-03-22 PROCEDURE — 25010000002 ONDANSETRON PER 1 MG: Performed by: INTERNAL MEDICINE

## 2017-03-22 PROCEDURE — 25010000002 CEFTRIAXONE: Performed by: INTERNAL MEDICINE

## 2017-03-22 RX ORDER — SODIUM CHLORIDE 9 MG/ML
INJECTION, SOLUTION INTRAVENOUS
Status: DISPENSED
Start: 2017-03-22 | End: 2017-03-23

## 2017-03-23 LAB
ALBUMIN SERPL-MCNC: 3.2 G/DL (ref 3.4–4.8)
ALBUMIN/GLOB SERPL: 1.2 G/DL (ref 1.1–1.8)
ALP SERPL-CCNC: 133 U/L (ref 38–126)
ALT SERPL W P-5'-P-CCNC: 142 U/L (ref 21–72)
ANION GAP SERPL CALCULATED.3IONS-SCNC: 9 MMOL/L (ref 5–15)
AST SERPL-CCNC: 67 U/L (ref 17–59)
BILIRUB SERPL-MCNC: 0.5 MG/DL (ref 0.2–1.3)
BUN BLD-MCNC: 31 MG/DL (ref 7–21)
BUN/CREAT SERPL: 75.6 (ref 7–25)
CALCIUM SPEC-SCNC: 8 MG/DL (ref 8.4–10.2)
CHLORIDE SERPL-SCNC: 91 MMOL/L (ref 95–110)
CO2 SERPL-SCNC: 34 MMOL/L (ref 22–31)
CREAT BLD-MCNC: 0.41 MG/DL (ref 0.7–1.3)
DEPRECATED RDW RBC AUTO: 51.5 FL (ref 35.1–43.9)
ERYTHROCYTE [DISTWIDTH] IN BLOOD BY AUTOMATED COUNT: 15.7 % (ref 11.5–14.5)
GFR SERPL CREATININE-BSD FRML MDRD: >150 ML/MIN/1.73 (ref 60–130)
GLOBULIN UR ELPH-MCNC: 2.6 GM/DL (ref 2.3–3.5)
GLUCOSE BLD-MCNC: 354 MG/DL (ref 60–100)
HCT VFR BLD AUTO: 33 % (ref 39–49)
HGB BLD-MCNC: 11.2 G/DL (ref 13.7–17.3)
MAGNESIUM SERPL-MCNC: 2 MG/DL (ref 1.6–2.3)
MCH RBC QN AUTO: 30.6 PG (ref 26.5–34)
MCHC RBC AUTO-ENTMCNC: 33.9 G/DL (ref 31.5–36.3)
MCV RBC AUTO: 90.2 FL (ref 80–98)
PLATELET # BLD AUTO: 272 10*3/MM3 (ref 150–450)
PMV BLD AUTO: 10.1 FL (ref 8–12)
POTASSIUM BLD-SCNC: 4.5 MMOL/L (ref 3.5–5.1)
PROT SERPL-MCNC: 5.8 G/DL (ref 6.3–8.6)
RBC # BLD AUTO: 3.66 10*6/MM3 (ref 4.37–5.74)
SODIUM BLD-SCNC: 134 MMOL/L (ref 137–145)
WBC NRBC COR # BLD: 22.03 10*3/MM3 (ref 3.2–9.8)

## 2017-03-23 PROCEDURE — 85027 COMPLETE CBC AUTOMATED: CPT | Performed by: INTERNAL MEDICINE

## 2017-03-23 PROCEDURE — 25010000002 ONDANSETRON PER 1 MG: Performed by: INTERNAL MEDICINE

## 2017-03-23 PROCEDURE — 25010000002 CEFTRIAXONE: Performed by: INTERNAL MEDICINE

## 2017-03-23 PROCEDURE — 83735 ASSAY OF MAGNESIUM: CPT | Performed by: INTERNAL MEDICINE

## 2017-03-23 PROCEDURE — 25010000002 METHYLPREDNISOLONE PER 40 MG: Performed by: NURSE PRACTITIONER

## 2017-03-23 PROCEDURE — 25010000002 METHYLPREDNISOLONE PER 40 MG: Performed by: INTERNAL MEDICINE

## 2017-03-23 PROCEDURE — 80053 COMPREHEN METABOLIC PANEL: CPT | Performed by: INTERNAL MEDICINE

## 2017-03-23 RX ORDER — METHYLPREDNISOLONE SODIUM SUCCINATE 40 MG/ML
20 INJECTION, POWDER, LYOPHILIZED, FOR SOLUTION INTRAMUSCULAR; INTRAVENOUS EVERY 12 HOURS
Status: DISCONTINUED | OUTPATIENT
Start: 2017-03-23 | End: 2017-03-28

## 2017-03-23 RX ORDER — SODIUM CHLORIDE 9 MG/ML
INJECTION, SOLUTION INTRAVENOUS
Status: DISPENSED
Start: 2017-03-23 | End: 2017-03-24

## 2017-03-24 PROCEDURE — 25010000002 METHYLPREDNISOLONE PER 40 MG: Performed by: NURSE PRACTITIONER

## 2017-03-24 PROCEDURE — 25010000002 ONDANSETRON PER 1 MG: Performed by: INTERNAL MEDICINE

## 2017-03-24 PROCEDURE — 25010000002 CEFTRIAXONE: Performed by: INTERNAL MEDICINE

## 2017-03-24 RX ORDER — SODIUM CHLORIDE 9 MG/ML
INJECTION, SOLUTION INTRAVENOUS
Status: DISPENSED
Start: 2017-03-24 | End: 2017-03-25

## 2017-03-25 LAB
ALBUMIN SERPL-MCNC: 3 G/DL (ref 3.4–4.8)
ALBUMIN/GLOB SERPL: 1.1 G/DL (ref 1.1–1.8)
ALP SERPL-CCNC: 130 U/L (ref 38–126)
ALT SERPL W P-5'-P-CCNC: 131 U/L (ref 21–72)
ANION GAP SERPL CALCULATED.3IONS-SCNC: 7 MMOL/L (ref 5–15)
AST SERPL-CCNC: 58 U/L (ref 17–59)
BILIRUB SERPL-MCNC: 0.4 MG/DL (ref 0.2–1.3)
BUN BLD-MCNC: 27 MG/DL (ref 7–21)
BUN/CREAT SERPL: 58.7 (ref 7–25)
CALCIUM SPEC-SCNC: 8.1 MG/DL (ref 8.4–10.2)
CHLORIDE SERPL-SCNC: 93 MMOL/L (ref 95–110)
CO2 SERPL-SCNC: 34 MMOL/L (ref 22–31)
CREAT BLD-MCNC: 0.46 MG/DL (ref 0.7–1.3)
DEPRECATED RDW RBC AUTO: 51 FL (ref 35.1–43.9)
ERYTHROCYTE [DISTWIDTH] IN BLOOD BY AUTOMATED COUNT: 15.8 % (ref 11.5–14.5)
GFR SERPL CREATININE-BSD FRML MDRD: 187 ML/MIN/1.73 (ref 56–130)
GLOBULIN UR ELPH-MCNC: 2.8 GM/DL (ref 2.3–3.5)
GLUCOSE BLD-MCNC: 199 MG/DL (ref 60–100)
HCT VFR BLD AUTO: 30.8 % (ref 39–49)
HGB BLD-MCNC: 10.7 G/DL (ref 13.7–17.3)
MAGNESIUM SERPL-MCNC: 2 MG/DL (ref 1.6–2.3)
MCH RBC QN AUTO: 31 PG (ref 26.5–34)
MCHC RBC AUTO-ENTMCNC: 34.7 G/DL (ref 31.5–36.3)
MCV RBC AUTO: 89.3 FL (ref 80–98)
PLATELET # BLD AUTO: 149 10*3/MM3 (ref 150–450)
PMV BLD AUTO: 10.2 FL (ref 8–12)
POTASSIUM BLD-SCNC: 4.3 MMOL/L (ref 3.5–5.1)
PROT SERPL-MCNC: 5.8 G/DL (ref 6.3–8.6)
RBC # BLD AUTO: 3.45 10*6/MM3 (ref 4.37–5.74)
SODIUM BLD-SCNC: 134 MMOL/L (ref 137–145)
WBC NRBC COR # BLD: 18.49 10*3/MM3 (ref 3.2–9.8)

## 2017-03-25 PROCEDURE — 85027 COMPLETE CBC AUTOMATED: CPT | Performed by: INTERNAL MEDICINE

## 2017-03-25 PROCEDURE — 83735 ASSAY OF MAGNESIUM: CPT | Performed by: INTERNAL MEDICINE

## 2017-03-25 PROCEDURE — 25010000002 ONDANSETRON PER 1 MG: Performed by: INTERNAL MEDICINE

## 2017-03-25 PROCEDURE — 25010000002 CEFTRIAXONE: Performed by: INTERNAL MEDICINE

## 2017-03-25 PROCEDURE — 25010000002 METHYLPREDNISOLONE PER 40 MG: Performed by: NURSE PRACTITIONER

## 2017-03-25 PROCEDURE — 80053 COMPREHEN METABOLIC PANEL: CPT | Performed by: INTERNAL MEDICINE

## 2017-03-25 RX ORDER — SODIUM CHLORIDE 9 MG/ML
INJECTION, SOLUTION INTRAVENOUS
Status: DISPENSED
Start: 2017-03-25 | End: 2017-03-26

## 2017-03-25 RX ORDER — LACTULOSE 10 G/15ML
10 SOLUTION ORAL ONCE
Status: DISCONTINUED | OUTPATIENT
Start: 2017-03-25 | End: 2017-03-28

## 2017-03-26 PROCEDURE — 25010000002 ONDANSETRON PER 1 MG: Performed by: INTERNAL MEDICINE

## 2017-03-26 PROCEDURE — 25010000002 CEFTRIAXONE: Performed by: INTERNAL MEDICINE

## 2017-03-26 PROCEDURE — 25010000002 METHYLPREDNISOLONE PER 40 MG: Performed by: NURSE PRACTITIONER

## 2017-03-26 RX ORDER — SODIUM CHLORIDE 9 MG/ML
INJECTION, SOLUTION INTRAVENOUS
Status: DISPENSED
Start: 2017-03-26 | End: 2017-03-27

## 2017-03-27 LAB
ALBUMIN SERPL-MCNC: 3.3 G/DL (ref 3.4–4.8)
ALBUMIN/GLOB SERPL: 1.1 G/DL (ref 1.1–1.8)
ALP SERPL-CCNC: 122 U/L (ref 38–126)
ALT SERPL W P-5'-P-CCNC: 108 U/L (ref 21–72)
ANION GAP SERPL CALCULATED.3IONS-SCNC: 7 MMOL/L (ref 5–15)
AST SERPL-CCNC: 41 U/L (ref 17–59)
BILIRUB SERPL-MCNC: 0.4 MG/DL (ref 0.2–1.3)
BUN BLD-MCNC: 24 MG/DL (ref 7–21)
BUN/CREAT SERPL: 53.3 (ref 7–25)
CALCIUM SPEC-SCNC: 8.2 MG/DL (ref 8.4–10.2)
CHLORIDE SERPL-SCNC: 93 MMOL/L (ref 95–110)
CO2 SERPL-SCNC: 34 MMOL/L (ref 22–31)
CREAT BLD-MCNC: 0.45 MG/DL (ref 0.7–1.3)
DEPRECATED RDW RBC AUTO: 52.3 FL (ref 35.1–43.9)
ERYTHROCYTE [DISTWIDTH] IN BLOOD BY AUTOMATED COUNT: 15.9 % (ref 11.5–14.5)
GFR SERPL CREATININE-BSD FRML MDRD: 192 ML/MIN/1.73 (ref 56–130)
GLOBULIN UR ELPH-MCNC: 3 GM/DL (ref 2.3–3.5)
GLUCOSE BLD-MCNC: 171 MG/DL (ref 60–100)
HCT VFR BLD AUTO: 33.4 % (ref 39–49)
HGB BLD-MCNC: 11.5 G/DL (ref 13.7–17.3)
MAGNESIUM SERPL-MCNC: 2 MG/DL (ref 1.6–2.3)
MCH RBC QN AUTO: 30.9 PG (ref 26.5–34)
MCHC RBC AUTO-ENTMCNC: 34.4 G/DL (ref 31.5–36.3)
MCV RBC AUTO: 89.8 FL (ref 80–98)
PLATELET # BLD AUTO: 180 10*3/MM3 (ref 150–450)
PMV BLD AUTO: 10.5 FL (ref 8–12)
POTASSIUM BLD-SCNC: 4.6 MMOL/L (ref 3.5–5.1)
PROT SERPL-MCNC: 6.3 G/DL (ref 6.3–8.6)
RBC # BLD AUTO: 3.72 10*6/MM3 (ref 4.37–5.74)
SODIUM BLD-SCNC: 134 MMOL/L (ref 137–145)
WBC NRBC COR # BLD: 20.89 10*3/MM3 (ref 3.2–9.8)

## 2017-03-27 PROCEDURE — 83735 ASSAY OF MAGNESIUM: CPT | Performed by: INTERNAL MEDICINE

## 2017-03-27 PROCEDURE — 25010000002 ONDANSETRON PER 1 MG: Performed by: INTERNAL MEDICINE

## 2017-03-27 PROCEDURE — 80053 COMPREHEN METABOLIC PANEL: CPT | Performed by: INTERNAL MEDICINE

## 2017-03-27 PROCEDURE — 85027 COMPLETE CBC AUTOMATED: CPT | Performed by: INTERNAL MEDICINE

## 2017-03-27 PROCEDURE — 25010000002 CEFTRIAXONE: Performed by: INTERNAL MEDICINE

## 2017-03-27 PROCEDURE — 25010000002 METHYLPREDNISOLONE PER 40 MG: Performed by: NURSE PRACTITIONER

## 2017-03-27 RX ORDER — SODIUM CHLORIDE 9 MG/ML
INJECTION, SOLUTION INTRAVENOUS
Status: DISPENSED
Start: 2017-03-27 | End: 2017-03-28

## 2017-03-28 PROCEDURE — 25010000002 ONDANSETRON PER 1 MG: Performed by: INTERNAL MEDICINE

## 2017-03-28 PROCEDURE — 25010000002 CEFTRIAXONE: Performed by: INTERNAL MEDICINE

## 2017-03-28 PROCEDURE — 25010000002 METHYLPREDNISOLONE PER 40 MG: Performed by: NURSE PRACTITIONER

## 2017-03-28 RX ORDER — PREDNISONE 20 MG/1
20 TABLET ORAL
Status: DISCONTINUED | OUTPATIENT
Start: 2017-03-29 | End: 2017-03-29 | Stop reason: HOSPADM

## 2017-03-28 RX ORDER — CEFDINIR 300 MG/1
300 CAPSULE ORAL EVERY 12 HOURS SCHEDULED
Status: DISCONTINUED | OUTPATIENT
Start: 2017-03-28 | End: 2017-03-29 | Stop reason: HOSPADM

## 2017-03-29 LAB
ALBUMIN SERPL-MCNC: 3 G/DL (ref 3.4–4.8)
ALBUMIN/GLOB SERPL: 1.1 G/DL (ref 1.1–1.8)
ALP SERPL-CCNC: 137 U/L (ref 38–126)
ALT SERPL W P-5'-P-CCNC: 89 U/L (ref 21–72)
ANION GAP SERPL CALCULATED.3IONS-SCNC: 7 MMOL/L (ref 5–15)
AST SERPL-CCNC: 31 U/L (ref 17–59)
BILIRUB SERPL-MCNC: 0.4 MG/DL (ref 0.2–1.3)
BUN BLD-MCNC: 26 MG/DL (ref 7–21)
BUN/CREAT SERPL: 50 (ref 7–25)
CALCIUM SPEC-SCNC: 8.1 MG/DL (ref 8.4–10.2)
CHLORIDE SERPL-SCNC: 91 MMOL/L (ref 95–110)
CO2 SERPL-SCNC: 34 MMOL/L (ref 22–31)
CREAT BLD-MCNC: 0.52 MG/DL (ref 0.7–1.3)
DEPRECATED RDW RBC AUTO: 51.3 FL (ref 35.1–43.9)
ERYTHROCYTE [DISTWIDTH] IN BLOOD BY AUTOMATED COUNT: 15.7 % (ref 11.5–14.5)
GFR SERPL CREATININE-BSD FRML MDRD: 163 ML/MIN/1.73 (ref 56–130)
GLOBULIN UR ELPH-MCNC: 2.7 GM/DL (ref 2.3–3.5)
GLUCOSE BLD-MCNC: 295 MG/DL (ref 60–100)
HCT VFR BLD AUTO: 31.8 % (ref 39–49)
HGB BLD-MCNC: 10.9 G/DL (ref 13.7–17.3)
MAGNESIUM SERPL-MCNC: 1.8 MG/DL (ref 1.6–2.3)
MCH RBC QN AUTO: 30.5 PG (ref 26.5–34)
MCHC RBC AUTO-ENTMCNC: 34.3 G/DL (ref 31.5–36.3)
MCV RBC AUTO: 89.1 FL (ref 80–98)
PLATELET # BLD AUTO: 158 10*3/MM3 (ref 150–450)
PMV BLD AUTO: 10.1 FL (ref 8–12)
POTASSIUM BLD-SCNC: 4.3 MMOL/L (ref 3.5–5.1)
PROT SERPL-MCNC: 5.7 G/DL (ref 6.3–8.6)
RBC # BLD AUTO: 3.57 10*6/MM3 (ref 4.37–5.74)
SODIUM BLD-SCNC: 132 MMOL/L (ref 137–145)
WBC NRBC COR # BLD: 18.25 10*3/MM3 (ref 3.2–9.8)

## 2017-03-29 PROCEDURE — 83735 ASSAY OF MAGNESIUM: CPT | Performed by: INTERNAL MEDICINE

## 2017-03-29 PROCEDURE — 63710000001 PREDNISONE PER 1 MG: Performed by: INTERNAL MEDICINE

## 2017-03-29 PROCEDURE — 80053 COMPREHEN METABOLIC PANEL: CPT | Performed by: INTERNAL MEDICINE

## 2017-03-29 PROCEDURE — 25010000002 ONDANSETRON PER 1 MG: Performed by: INTERNAL MEDICINE

## 2017-03-29 PROCEDURE — 85027 COMPLETE CBC AUTOMATED: CPT | Performed by: INTERNAL MEDICINE

## 2017-10-26 ENCOUNTER — APPOINTMENT (OUTPATIENT)
Dept: CT IMAGING | Facility: HOSPITAL | Age: 62
End: 2017-10-26

## 2017-10-26 ENCOUNTER — APPOINTMENT (OUTPATIENT)
Dept: GENERAL RADIOLOGY | Facility: HOSPITAL | Age: 62
End: 2017-10-26

## 2017-10-26 ENCOUNTER — HOSPITAL ENCOUNTER (INPATIENT)
Facility: HOSPITAL | Age: 62
LOS: 7 days | Discharge: NURSING, REHAB OR LONG-TERM CARE | End: 2017-11-03
Attending: EMERGENCY MEDICINE | Admitting: INTERNAL MEDICINE

## 2017-10-26 DIAGNOSIS — R77.8 ELEVATED TROPONIN: ICD-10-CM

## 2017-10-26 DIAGNOSIS — Z78.9 DECREASED ACTIVITIES OF DAILY LIVING (ADL): ICD-10-CM

## 2017-10-26 DIAGNOSIS — R41.82 ALTERED MENTAL STATUS, UNSPECIFIED ALTERED MENTAL STATUS TYPE: ICD-10-CM

## 2017-10-26 DIAGNOSIS — N19 UREMIA: ICD-10-CM

## 2017-10-26 DIAGNOSIS — E87.6 HYPOKALEMIA: ICD-10-CM

## 2017-10-26 DIAGNOSIS — J96.90 TYPE I RESPIRATORY FAILURE (HCC): ICD-10-CM

## 2017-10-26 DIAGNOSIS — B18.2 CHRONIC HEPATITIS C WITHOUT HEPATIC COMA (HCC): ICD-10-CM

## 2017-10-26 DIAGNOSIS — D64.9 ANEMIA, UNSPECIFIED TYPE: ICD-10-CM

## 2017-10-26 DIAGNOSIS — D69.6 THROMBOCYTOPENIA (HCC): ICD-10-CM

## 2017-10-26 DIAGNOSIS — N17.9 ACUTE RENAL FAILURE, UNSPECIFIED ACUTE RENAL FAILURE TYPE (HCC): ICD-10-CM

## 2017-10-26 DIAGNOSIS — J18.9 PNEUMONIA OF BOTH LUNGS DUE TO INFECTIOUS ORGANISM, UNSPECIFIED PART OF LUNG: Primary | ICD-10-CM

## 2017-10-26 DIAGNOSIS — R13.12 OROPHARYNGEAL DYSPHAGIA: ICD-10-CM

## 2017-10-26 DIAGNOSIS — D72.829 LEUKOCYTOSIS, UNSPECIFIED TYPE: ICD-10-CM

## 2017-10-26 DIAGNOSIS — Z74.09 IMPAIRED FUNCTIONAL MOBILITY, BALANCE, GAIT, AND ENDURANCE: ICD-10-CM

## 2017-10-26 DIAGNOSIS — E86.0 DEHYDRATION: ICD-10-CM

## 2017-10-26 LAB
APAP SERPL-MCNC: <10 MCG/ML (ref 10–30)
APTT PPP: 28.2 SECONDS (ref 20–40.3)
D-LACTATE SERPL-SCNC: 1.2 MMOL/L (ref 0.5–2)
DEPRECATED RDW RBC AUTO: 40.5 FL (ref 35.1–43.9)
ERYTHROCYTE [DISTWIDTH] IN BLOOD BY AUTOMATED COUNT: 14.5 % (ref 11.5–14.5)
ETHANOL BLD-MCNC: <10 MG/DL (ref 0–10)
ETHANOL UR QL: <0.01 %
HCT VFR BLD AUTO: 23 % (ref 39–49)
HGB BLD-MCNC: 7.8 G/DL (ref 13.7–17.3)
INR PPP: 1.15 (ref 0.8–1.2)
MCH RBC QN AUTO: 25.7 PG (ref 26.5–34)
MCHC RBC AUTO-ENTMCNC: 33.9 G/DL (ref 31.5–36.3)
MCV RBC AUTO: 75.9 FL (ref 80–98)
PLATELET # BLD AUTO: 109 10*3/MM3 (ref 150–450)
PMV BLD AUTO: 8.9 FL (ref 8–12)
PROTHROMBIN TIME: 14.7 SECONDS (ref 11.1–15.3)
RBC # BLD AUTO: 3.03 10*6/MM3 (ref 4.37–5.74)
SALICYLATES SERPL-MCNC: <1 MG/DL (ref 10–20)
WBC NRBC COR # BLD: 14.91 10*3/MM3 (ref 3.2–9.8)

## 2017-10-26 PROCEDURE — 85007 BL SMEAR W/DIFF WBC COUNT: CPT | Performed by: EMERGENCY MEDICINE

## 2017-10-26 PROCEDURE — 93005 ELECTROCARDIOGRAM TRACING: CPT | Performed by: INTERNAL MEDICINE

## 2017-10-26 PROCEDURE — 70450 CT HEAD/BRAIN W/O DYE: CPT

## 2017-10-26 PROCEDURE — 80307 DRUG TEST PRSMV CHEM ANLYZR: CPT | Performed by: EMERGENCY MEDICINE

## 2017-10-26 PROCEDURE — 80053 COMPREHEN METABOLIC PANEL: CPT | Performed by: EMERGENCY MEDICINE

## 2017-10-26 PROCEDURE — 84484 ASSAY OF TROPONIN QUANT: CPT | Performed by: EMERGENCY MEDICINE

## 2017-10-26 PROCEDURE — 25010000002 PIPERACILLIN SOD-TAZOBACTAM PER 1 G: Performed by: EMERGENCY MEDICINE

## 2017-10-26 PROCEDURE — 94799 UNLISTED PULMONARY SVC/PX: CPT

## 2017-10-26 PROCEDURE — 85730 THROMBOPLASTIN TIME PARTIAL: CPT | Performed by: EMERGENCY MEDICINE

## 2017-10-26 PROCEDURE — 94760 N-INVAS EAR/PLS OXIMETRY 1: CPT

## 2017-10-26 PROCEDURE — 83605 ASSAY OF LACTIC ACID: CPT | Performed by: EMERGENCY MEDICINE

## 2017-10-26 PROCEDURE — 85610 PROTHROMBIN TIME: CPT | Performed by: EMERGENCY MEDICINE

## 2017-10-26 PROCEDURE — 0BH17EZ INSERTION OF ENDOTRACHEAL AIRWAY INTO TRACHEA, VIA NATURAL OR ARTIFICIAL OPENING: ICD-10-PCS | Performed by: EMERGENCY MEDICINE

## 2017-10-26 PROCEDURE — 93010 ELECTROCARDIOGRAM REPORT: CPT | Performed by: INTERNAL MEDICINE

## 2017-10-26 PROCEDURE — 31500 INSERT EMERGENCY AIRWAY: CPT

## 2017-10-26 PROCEDURE — 5A1955Z RESPIRATORY VENTILATION, GREATER THAN 96 CONSECUTIVE HOURS: ICD-10-PCS | Performed by: EMERGENCY MEDICINE

## 2017-10-26 PROCEDURE — 82140 ASSAY OF AMMONIA: CPT | Performed by: EMERGENCY MEDICINE

## 2017-10-26 PROCEDURE — 25010000002 MIDAZOLAM PER 1 MG: Performed by: EMERGENCY MEDICINE

## 2017-10-26 PROCEDURE — 99291 CRITICAL CARE FIRST HOUR: CPT

## 2017-10-26 PROCEDURE — 84300 ASSAY OF URINE SODIUM: CPT | Performed by: INTERNAL MEDICINE

## 2017-10-26 PROCEDURE — 71010 HC CHEST PA OR AP: CPT

## 2017-10-26 PROCEDURE — 85025 COMPLETE CBC W/AUTO DIFF WBC: CPT | Performed by: EMERGENCY MEDICINE

## 2017-10-26 PROCEDURE — 94002 VENT MGMT INPAT INIT DAY: CPT

## 2017-10-26 PROCEDURE — 82570 ASSAY OF URINE CREATININE: CPT | Performed by: INTERNAL MEDICINE

## 2017-10-26 RX ORDER — CARDIOPLEGIC SOLUTION NO.5 40MEQ/1001
2 PLASTIC BAG, PERFUSION (ML) PERFUSION CONTINUOUS
Status: DISCONTINUED | OUTPATIENT
Start: 2017-10-27 | End: 2017-10-26

## 2017-10-26 RX ORDER — MIDAZOLAM HYDROCHLORIDE 1 MG/ML
2 INJECTION INTRAMUSCULAR; INTRAVENOUS CONTINUOUS
Status: DISCONTINUED | OUTPATIENT
Start: 2017-10-26 | End: 2017-10-27

## 2017-10-26 RX ORDER — MIDAZOLAM HYDROCHLORIDE 1 MG/ML
2 INJECTION INTRAMUSCULAR; INTRAVENOUS ONCE
Status: COMPLETED | OUTPATIENT
Start: 2017-10-26 | End: 2017-10-26

## 2017-10-26 RX ORDER — MIDAZOLAM HYDROCHLORIDE 1 MG/ML
INJECTION INTRAMUSCULAR; INTRAVENOUS
Status: DISCONTINUED
Start: 2017-10-26 | End: 2017-10-26 | Stop reason: WASHOUT

## 2017-10-26 RX ADMIN — MIDAZOLAM 2 MG: 1 INJECTION INTRAMUSCULAR; INTRAVENOUS at 23:35

## 2017-10-26 RX ADMIN — TAZOBACTAM SODIUM AND PIPERACILLIN SODIUM 4.5 G: 500; 4 INJECTION, SOLUTION INTRAVENOUS at 23:33

## 2017-10-26 RX ADMIN — SUCCINYLCHOLINE CHLORIDE 100 MG: 20 INJECTION, SOLUTION INTRAMUSCULAR; INTRAVENOUS at 23:19

## 2017-10-26 RX ADMIN — ETOMIDATE 20 MG: 2 INJECTION, SOLUTION INTRAVENOUS at 23:18

## 2017-10-27 ENCOUNTER — APPOINTMENT (OUTPATIENT)
Dept: CARDIOLOGY | Facility: HOSPITAL | Age: 62
End: 2017-10-27
Attending: INTERNAL MEDICINE

## 2017-10-27 ENCOUNTER — APPOINTMENT (OUTPATIENT)
Dept: INTERVENTIONAL RADIOLOGY/VASCULAR | Facility: HOSPITAL | Age: 62
End: 2017-10-27

## 2017-10-27 ENCOUNTER — APPOINTMENT (OUTPATIENT)
Dept: GENERAL RADIOLOGY | Facility: HOSPITAL | Age: 62
End: 2017-10-27

## 2017-10-27 ENCOUNTER — APPOINTMENT (OUTPATIENT)
Dept: ULTRASOUND IMAGING | Facility: HOSPITAL | Age: 62
End: 2017-10-27

## 2017-10-27 PROBLEM — E87.6 HYPOKALEMIA: Status: ACTIVE | Noted: 2017-10-27

## 2017-10-27 PROBLEM — R64 CACHEXIA (HCC): Chronic | Status: ACTIVE | Noted: 2017-10-27

## 2017-10-27 PROBLEM — D64.9 ANEMIA: Chronic | Status: ACTIVE | Noted: 2017-10-27

## 2017-10-27 PROBLEM — J18.9 PNEUMONIA OF BOTH LUNGS DUE TO INFECTIOUS ORGANISM: Status: ACTIVE | Noted: 2017-10-27

## 2017-10-27 PROBLEM — K92.2 GI BLEEDING: Status: ACTIVE | Noted: 2017-10-27

## 2017-10-27 PROBLEM — N17.9 ACUTE RENAL FAILURE (ARF) (HCC): Status: ACTIVE | Noted: 2017-10-27

## 2017-10-27 PROBLEM — J44.1 DECOMPENSATED COPD WITH EXACERBATION (CHRONIC OBSTRUCTIVE PULMONARY DISEASE) (HCC): Chronic | Status: ACTIVE | Noted: 2017-03-11

## 2017-10-27 LAB
ABO GROUP BLD: NORMAL
ALBUMIN SERPL-MCNC: 2.3 G/DL (ref 3.4–4.8)
ALBUMIN SERPL-MCNC: 2.8 G/DL (ref 3.4–4.8)
ALBUMIN/GLOB SERPL: 0.6 G/DL (ref 1.1–1.8)
ALBUMIN/GLOB SERPL: 0.6 G/DL (ref 1.1–1.8)
ALP SERPL-CCNC: 66 U/L (ref 38–126)
ALP SERPL-CCNC: 88 U/L (ref 38–126)
ALT SERPL W P-5'-P-CCNC: 19 U/L (ref 21–72)
ALT SERPL W P-5'-P-CCNC: 23 U/L (ref 21–72)
AMMONIA BLD-SCNC: <9 UMOL/L (ref 9–30)
AMPHET+METHAMPHET UR QL: POSITIVE
ANION GAP SERPL CALCULATED.3IONS-SCNC: 14 MMOL/L (ref 5–15)
ANION GAP SERPL CALCULATED.3IONS-SCNC: 15 MMOL/L (ref 5–15)
ANION GAP SERPL CALCULATED.3IONS-SCNC: 16 MMOL/L (ref 5–15)
ARTERIAL PATENCY WRIST A: ABNORMAL
ARTERIAL PATENCY WRIST A: ABNORMAL
AST SERPL-CCNC: 28 U/L (ref 17–59)
AST SERPL-CCNC: 32 U/L (ref 17–59)
ATMOSPHERIC PRESS: ABNORMAL MMHG
ATMOSPHERIC PRESS: ABNORMAL MMHG
BARBITURATES UR QL SCN: NEGATIVE
BASE EXCESS BLDA CALC-SCNC: -5 MMOL/L (ref -2.4–2.4)
BASE EXCESS BLDA CALC-SCNC: -6.3 MMOL/L (ref -2.4–2.4)
BASOPHILS # BLD AUTO: 0.02 10*3/MM3 (ref 0–0.2)
BASOPHILS NFR BLD AUTO: 0.2 % (ref 0–2)
BDY SITE: ABNORMAL
BDY SITE: ABNORMAL
BENZODIAZ UR QL SCN: POSITIVE
BH CV ECHO MEAS - ACS: 2.2 CM
BH CV ECHO MEAS - AO MAX PG (FULL): 1.9 MMHG
BH CV ECHO MEAS - AO MAX PG: 3.6 MMHG
BH CV ECHO MEAS - AO MEAN PG (FULL): 1.2 MMHG
BH CV ECHO MEAS - AO MEAN PG: 2 MMHG
BH CV ECHO MEAS - AO ROOT AREA: 8.9 CM^2
BH CV ECHO MEAS - AO ROOT DIAM: 3.4 CM
BH CV ECHO MEAS - AO V2 MAX: 95.3 CM/SEC
BH CV ECHO MEAS - AO V2 MEAN: 66.4 CM/SEC
BH CV ECHO MEAS - AO V2 VTI: 28.7 CM
BH CV ECHO MEAS - AVA(I,A): 3 CM^2
BH CV ECHO MEAS - AVA(I,D): 3 CM^2
BH CV ECHO MEAS - AVA(V,A): 3.2 CM^2
BH CV ECHO MEAS - AVA(V,D): 3.2 CM^2
BH CV ECHO MEAS - EDV(CUBED): 47.6 ML
BH CV ECHO MEAS - EDV(TEICH): 55.3 ML
BH CV ECHO MEAS - EF(CUBED): 74.6 %
BH CV ECHO MEAS - EF(TEICH): 67.4 %
BH CV ECHO MEAS - ESV(CUBED): 12.1 ML
BH CV ECHO MEAS - ESV(TEICH): 18 ML
BH CV ECHO MEAS - FS: 36.7 %
BH CV ECHO MEAS - IVS/LVPW: 0.98
BH CV ECHO MEAS - IVSD: 1 CM
BH CV ECHO MEAS - LA DIMENSION: 2.7 CM
BH CV ECHO MEAS - LA/AO: 0.81
BH CV ECHO MEAS - LV MASS(C)D: 114.5 GRAMS
BH CV ECHO MEAS - LV MAX PG: 1.8 MMHG
BH CV ECHO MEAS - LV MEAN PG: 0.8 MMHG
BH CV ECHO MEAS - LV V1 MAX: 66.6 CM/SEC
BH CV ECHO MEAS - LV V1 MEAN: 40.4 CM/SEC
BH CV ECHO MEAS - LV V1 VTI: 19.1 CM
BH CV ECHO MEAS - LVIDD: 3.6 CM
BH CV ECHO MEAS - LVIDS: 2.3 CM
BH CV ECHO MEAS - LVOT AREA: 4.6 CM^2
BH CV ECHO MEAS - LVOT DIAM: 2.4 CM
BH CV ECHO MEAS - LVPWD: 1 CM
BH CV ECHO MEAS - MR MAX PG: 48.9 MMHG
BH CV ECHO MEAS - MR MAX VEL: 349.6 CM/SEC
BH CV ECHO MEAS - MV A MAX VEL: 83.4 CM/SEC
BH CV ECHO MEAS - MV E MAX VEL: 106.8 CM/SEC
BH CV ECHO MEAS - MV E/A: 1.3
BH CV ECHO MEAS - MV MAX PG: 5.7 MMHG
BH CV ECHO MEAS - MV MEAN PG: 1.9 MMHG
BH CV ECHO MEAS - MV P1/2T MAX VEL: 130 CM/SEC
BH CV ECHO MEAS - MV V2 MAX: 119 CM/SEC
BH CV ECHO MEAS - MV V2 MEAN: 61.3 CM/SEC
BH CV ECHO MEAS - MV V2 VTI: 37.2 CM
BH CV ECHO MEAS - MVA P1/2T LCG: 1.7 CM^2
BH CV ECHO MEAS - MVA(VTI): 2.3 CM^2
BH CV ECHO MEAS - PA MAX PG: 1.8 MMHG
BH CV ECHO MEAS - PA V2 MAX: 67.8 CM/SEC
BH CV ECHO MEAS - PI END-D VEL: 73.3 CM/SEC
BH CV ECHO MEAS - RAP SYSTOLE: 5 MMHG
BH CV ECHO MEAS - RVDD: 2.3 CM
BH CV ECHO MEAS - RVSP: 30.5 MMHG
BH CV ECHO MEAS - SV(AO): 256.8 ML
BH CV ECHO MEAS - SV(CUBED): 35.5 ML
BH CV ECHO MEAS - SV(LVOT): 87 ML
BH CV ECHO MEAS - SV(TEICH): 37.3 ML
BH CV ECHO MEAS - TR MAX VEL: 252.5 CM/SEC
BILIRUB SERPL-MCNC: 0.4 MG/DL (ref 0.2–1.3)
BILIRUB SERPL-MCNC: 0.5 MG/DL (ref 0.2–1.3)
BLD GP AB SCN SERPL QL: NEGATIVE
BUN BLD-MCNC: 79 MG/DL (ref 7–21)
BUN BLD-MCNC: 88 MG/DL (ref 7–21)
BUN BLD-MCNC: 91 MG/DL (ref 7–21)
BUN/CREAT SERPL: 12.6 (ref 7–25)
BUN/CREAT SERPL: 13 (ref 7–25)
BUN/CREAT SERPL: 13.2 (ref 7–25)
CA-I BLD-MCNC: 4 MG/DL (ref 4.5–4.9)
CA-I BLD-MCNC: 4.1 MG/DL (ref 4.5–4.9)
CALCIUM SPEC-SCNC: 6.9 MG/DL (ref 8.4–10.2)
CALCIUM SPEC-SCNC: 7 MG/DL (ref 8.4–10.2)
CALCIUM SPEC-SCNC: 7.7 MG/DL (ref 8.4–10.2)
CANNABINOIDS SERPL QL: NEGATIVE
CHLORIDE SERPL-SCNC: 102 MMOL/L (ref 95–110)
CHLORIDE SERPL-SCNC: 107 MMOL/L (ref 95–110)
CHLORIDE SERPL-SCNC: 96 MMOL/L (ref 95–110)
CO2 BLDA-SCNC: 20.9 MMOL/L (ref 23–27)
CO2 BLDA-SCNC: 21.6 MMOL/L (ref 23–27)
CO2 SERPL-SCNC: 16 MMOL/L (ref 22–31)
CO2 SERPL-SCNC: 19 MMOL/L (ref 22–31)
CO2 SERPL-SCNC: 21 MMOL/L (ref 22–31)
COCAINE UR QL: NEGATIVE
CREAT BLD-MCNC: 6.09 MG/DL (ref 0.7–1.3)
CREAT BLD-MCNC: 6.67 MG/DL (ref 0.7–1.3)
CREAT BLD-MCNC: 7.23 MG/DL (ref 0.7–1.3)
CREAT UR-MCNC: 95.7 MG/DL
DEPRECATED RDW RBC AUTO: 40.1 FL (ref 35.1–43.9)
EOSINOPHIL # BLD AUTO: 0 10*3/MM3 (ref 0–0.7)
EOSINOPHIL NFR BLD AUTO: 0 % (ref 0–7)
ERYTHROCYTE [DISTWIDTH] IN BLOOD BY AUTOMATED COUNT: 14.3 % (ref 11.5–14.5)
GFR SERPL CREATININE-BSD FRML MDRD: 8 ML/MIN/1.73 (ref 49–113)
GFR SERPL CREATININE-BSD FRML MDRD: 9 ML/MIN/1.73 (ref 49–113)
GFR SERPL CREATININE-BSD FRML MDRD: 9 ML/MIN/1.73 (ref 49–113)
GLOBULIN UR ELPH-MCNC: 3.7 GM/DL (ref 2.3–3.5)
GLOBULIN UR ELPH-MCNC: 4.4 GM/DL (ref 2.3–3.5)
GLUCOSE BLD-MCNC: 116 MG/DL (ref 60–100)
GLUCOSE BLD-MCNC: 130 MG/DL (ref 60–100)
GLUCOSE BLD-MCNC: 141 MG/DL (ref 60–100)
GLUCOSE BLDA-MCNC: 140 MMOL/L
GLUCOSE BLDA-MCNC: 140 MMOL/L
HCO3 BLDA-SCNC: 19.6 MMOL/L (ref 22–26)
HCO3 BLDA-SCNC: 20.4 MMOL/L (ref 22–26)
HCT VFR BLD AUTO: 18.4 % (ref 39–49)
HCT VFR BLD AUTO: 25.8 % (ref 39–49)
HCT VFR BLD CALC: 18 % (ref 40–54)
HCT VFR BLD CALC: 24 % (ref 40–54)
HGB BLD-MCNC: 6.2 G/DL (ref 13.7–17.3)
HGB BLD-MCNC: 9.2 G/DL (ref 13.7–17.3)
HGB BLDA-MCNC: 6.1 G/DL (ref 14–18)
HGB BLDA-MCNC: 8.1 G/DL (ref 14–18)
HOLD SPECIMEN: NORMAL
HYPOCHROMIA BLD QL: ABNORMAL
IMM GRANULOCYTES # BLD: 0.05 10*3/MM3 (ref 0–0.02)
IMM GRANULOCYTES NFR BLD: 0.4 % (ref 0–0.5)
LV EF 2D ECHO EST: 63 %
LYMPHOCYTES # BLD AUTO: 1.1 10*3/MM3 (ref 0.6–4.2)
LYMPHOCYTES # BLD MANUAL: 1.04 10*3/MM3 (ref 0.6–4.2)
LYMPHOCYTES NFR BLD AUTO: 8.6 % (ref 10–50)
LYMPHOCYTES NFR BLD MANUAL: 4 % (ref 0–12)
LYMPHOCYTES NFR BLD MANUAL: 7 % (ref 10–50)
Lab: NORMAL
MAGNESIUM SERPL-MCNC: 1.7 MG/DL (ref 1.6–2.3)
MCH RBC QN AUTO: 25.4 PG (ref 26.5–34)
MCHC RBC AUTO-ENTMCNC: 33.7 G/DL (ref 31.5–36.3)
MCV RBC AUTO: 75.4 FL (ref 80–98)
METHADONE UR QL SCN: NEGATIVE
MICROCYTES BLD QL: ABNORMAL
MODALITY: ABNORMAL
MODALITY: ABNORMAL
MONOCYTES # BLD AUTO: 0.6 10*3/MM3 (ref 0–0.9)
MONOCYTES # BLD AUTO: 0.9 10*3/MM3 (ref 0–0.9)
MONOCYTES NFR BLD AUTO: 7 % (ref 0–12)
NEUTROPHILS # BLD AUTO: 10.73 10*3/MM3 (ref 2–8.6)
NEUTROPHILS # BLD AUTO: 13.27 10*3/MM3 (ref 2–8.6)
NEUTROPHILS NFR BLD AUTO: 83.8 % (ref 37–80)
NEUTROPHILS NFR BLD MANUAL: 89 % (ref 37–80)
NRBC BLD MANUAL-RTO: 0 /100 WBC (ref 0–0)
OPIATES UR QL: NEGATIVE
OXYCODONE UR QL SCN: NEGATIVE
PCO2 BLDA: 39.3 MM HG (ref 35–45)
PCO2 BLDA: 41.1 MM HG (ref 35–45)
PH BLDA: 7.3 PH UNITS (ref 7.35–7.45)
PH BLDA: 7.33 PH UNITS (ref 7.35–7.45)
PHOSPHATE SERPL-MCNC: 7.6 MG/DL (ref 2.4–4.4)
PLATELET # BLD AUTO: 85 10*3/MM3 (ref 150–450)
PMV BLD AUTO: 9.3 FL (ref 8–12)
PO2 BLDA: 119.8 MM HG (ref 80–105)
PO2 BLDA: 135.3 MM HG (ref 80–105)
POTASSIUM BLD-SCNC: 2.5 MMOL/L (ref 3.5–5.1)
POTASSIUM BLD-SCNC: 2.6 MMOL/L (ref 3.5–5.1)
POTASSIUM BLD-SCNC: 4.5 MMOL/L (ref 3.5–5.1)
POTASSIUM BLDA-SCNC: 2.63 MMOL/L (ref 3.6–4.9)
POTASSIUM BLDA-SCNC: 2.69 MMOL/L (ref 3.6–4.9)
PROT SERPL-MCNC: 6 G/DL (ref 6.3–8.6)
PROT SERPL-MCNC: 7.2 G/DL (ref 6.3–8.6)
RBC # BLD AUTO: 2.44 10*6/MM3 (ref 4.37–5.74)
RH BLD: POSITIVE
SAO2 % BLDCOA: 97.8 %
SAO2 % BLDCOA: 98.1 %
SMALL PLATELETS BLD QL SMEAR: ABNORMAL
SODIUM BLD-SCNC: 132 MMOL/L (ref 137–145)
SODIUM BLD-SCNC: 137 MMOL/L (ref 137–145)
SODIUM BLD-SCNC: 137 MMOL/L (ref 137–145)
SODIUM BLDA-SCNC: 131.9 MMOL/L (ref 138–146)
SODIUM BLDA-SCNC: 134.1 MMOL/L (ref 138–146)
SODIUM UR-SCNC: 36 MMOL/L (ref 30–90)
TROPONIN I SERPL-MCNC: 0.12 NG/ML
WBC MORPH BLD: NORMAL
WBC NRBC COR # BLD: 12.8 10*3/MM3 (ref 3.2–9.8)
WHOLE BLOOD HOLD SPECIMEN: NORMAL

## 2017-10-27 PROCEDURE — 25010000002 SUCCINYLCHOLINE PER 20 MG: Performed by: EMERGENCY MEDICINE

## 2017-10-27 PROCEDURE — 25010000002 ALBUMIN HUMAN 5% PER 50 ML: Performed by: INTERNAL MEDICINE

## 2017-10-27 PROCEDURE — 25010000002 MIDAZOLAM 50 MG/10ML SOLUTION 10 ML VIAL: Performed by: EMERGENCY MEDICINE

## 2017-10-27 PROCEDURE — 25010000003 POTASSIUM CHLORIDE 10 MEQ/100ML SOLUTION

## 2017-10-27 PROCEDURE — 93306 TTE W/DOPPLER COMPLETE: CPT | Performed by: INTERNAL MEDICINE

## 2017-10-27 PROCEDURE — 36430 TRANSFUSION BLD/BLD COMPNT: CPT

## 2017-10-27 PROCEDURE — 86901 BLOOD TYPING SEROLOGIC RH(D): CPT | Performed by: INTERNAL MEDICINE

## 2017-10-27 PROCEDURE — 80053 COMPREHEN METABOLIC PANEL: CPT | Performed by: INTERNAL MEDICINE

## 2017-10-27 PROCEDURE — 93306 TTE W/DOPPLER COMPLETE: CPT

## 2017-10-27 PROCEDURE — P9041 ALBUMIN (HUMAN),5%, 50ML: HCPCS | Performed by: INTERNAL MEDICINE

## 2017-10-27 PROCEDURE — 76775 US EXAM ABDO BACK WALL LIM: CPT

## 2017-10-27 PROCEDURE — 86900 BLOOD TYPING SEROLOGIC ABO: CPT | Performed by: INTERNAL MEDICINE

## 2017-10-27 PROCEDURE — 87077 CULTURE AEROBIC IDENTIFY: CPT | Performed by: EMERGENCY MEDICINE

## 2017-10-27 PROCEDURE — 93005 ELECTROCARDIOGRAM TRACING: CPT | Performed by: INTERNAL MEDICINE

## 2017-10-27 PROCEDURE — P9016 RBC LEUKOCYTES REDUCED: HCPCS

## 2017-10-27 PROCEDURE — 87205 SMEAR GRAM STAIN: CPT | Performed by: EMERGENCY MEDICINE

## 2017-10-27 PROCEDURE — C1751 CATH, INF, PER/CENT/MIDLINE: HCPCS

## 2017-10-27 PROCEDURE — 25010000002 METHYLPREDNISOLONE PER 125 MG: Performed by: INTERNAL MEDICINE

## 2017-10-27 PROCEDURE — 02HV33Z INSERTION OF INFUSION DEVICE INTO SUPERIOR VENA CAVA, PERCUTANEOUS APPROACH: ICD-10-PCS | Performed by: INTERNAL MEDICINE

## 2017-10-27 PROCEDURE — 85018 HEMOGLOBIN: CPT | Performed by: INTERNAL MEDICINE

## 2017-10-27 PROCEDURE — 87150 DNA/RNA AMPLIFIED PROBE: CPT | Performed by: EMERGENCY MEDICINE

## 2017-10-27 PROCEDURE — 94799 UNLISTED PULMONARY SVC/PX: CPT

## 2017-10-27 PROCEDURE — 84100 ASSAY OF PHOSPHORUS: CPT | Performed by: INTERNAL MEDICINE

## 2017-10-27 PROCEDURE — 36600 WITHDRAWAL OF ARTERIAL BLOOD: CPT

## 2017-10-27 PROCEDURE — 85025 COMPLETE CBC W/AUTO DIFF WBC: CPT | Performed by: INTERNAL MEDICINE

## 2017-10-27 PROCEDURE — 25010000002 VANCOMYCIN PER 500 MG: Performed by: EMERGENCY MEDICINE

## 2017-10-27 PROCEDURE — 86900 BLOOD TYPING SEROLOGIC ABO: CPT

## 2017-10-27 PROCEDURE — 85014 HEMATOCRIT: CPT | Performed by: INTERNAL MEDICINE

## 2017-10-27 PROCEDURE — 94760 N-INVAS EAR/PLS OXIMETRY 1: CPT

## 2017-10-27 PROCEDURE — 25010000002 PROPOFOL 1000 MG/ML EMULSION: Performed by: INTERNAL MEDICINE

## 2017-10-27 PROCEDURE — 25810000003 SODIUM CHLORIDE 0.9 % WITH KCL 20 MEQ 20-0.9 MEQ/L-% SOLUTION: Performed by: INTERNAL MEDICINE

## 2017-10-27 PROCEDURE — 93010 ELECTROCARDIOGRAM REPORT: CPT | Performed by: INTERNAL MEDICINE

## 2017-10-27 PROCEDURE — 25010000003 POTASSIUM CHLORIDE 10 MEQ/100ML SOLUTION: Performed by: INTERNAL MEDICINE

## 2017-10-27 PROCEDURE — 83735 ASSAY OF MAGNESIUM: CPT | Performed by: INTERNAL MEDICINE

## 2017-10-27 PROCEDURE — 86850 RBC ANTIBODY SCREEN: CPT | Performed by: INTERNAL MEDICINE

## 2017-10-27 PROCEDURE — 87076 CULTURE ANAEROBE IDENT EACH: CPT | Performed by: EMERGENCY MEDICINE

## 2017-10-27 PROCEDURE — 82803 BLOOD GASES ANY COMBINATION: CPT | Performed by: EMERGENCY MEDICINE

## 2017-10-27 PROCEDURE — 94762 N-INVAS EAR/PLS OXIMTRY CONT: CPT

## 2017-10-27 PROCEDURE — 87186 SC STD MICRODIL/AGAR DIL: CPT | Performed by: EMERGENCY MEDICINE

## 2017-10-27 PROCEDURE — 87185 SC STD ENZYME DETCJ PER NZM: CPT | Performed by: EMERGENCY MEDICINE

## 2017-10-27 PROCEDURE — 82803 BLOOD GASES ANY COMBINATION: CPT | Performed by: INTERNAL MEDICINE

## 2017-10-27 PROCEDURE — 87070 CULTURE OTHR SPECIMN AEROBIC: CPT | Performed by: EMERGENCY MEDICINE

## 2017-10-27 PROCEDURE — 76937 US GUIDE VASCULAR ACCESS: CPT

## 2017-10-27 PROCEDURE — 87040 BLOOD CULTURE FOR BACTERIA: CPT | Performed by: EMERGENCY MEDICINE

## 2017-10-27 PROCEDURE — 25010000002 MIDAZOLAM 50 MG/10ML SOLUTION 10 ML VIAL: Performed by: INTERNAL MEDICINE

## 2017-10-27 PROCEDURE — 94003 VENT MGMT INPAT SUBQ DAY: CPT

## 2017-10-27 PROCEDURE — B548ZZA ULTRASONOGRAPHY OF SUPERIOR VENA CAVA, GUIDANCE: ICD-10-PCS | Performed by: INTERNAL MEDICINE

## 2017-10-27 PROCEDURE — 86923 COMPATIBILITY TEST ELECTRIC: CPT

## 2017-10-27 RX ORDER — SODIUM CHLORIDE AND POTASSIUM CHLORIDE 150; 900 MG/100ML; MG/100ML
200 INJECTION, SOLUTION INTRAVENOUS CONTINUOUS
Status: DISCONTINUED | OUTPATIENT
Start: 2017-10-27 | End: 2017-10-27

## 2017-10-27 RX ORDER — HEPARIN SODIUM 5000 [USP'U]/ML
5000 INJECTION, SOLUTION INTRAVENOUS; SUBCUTANEOUS EVERY 12 HOURS SCHEDULED
Status: DISCONTINUED | OUTPATIENT
Start: 2017-10-27 | End: 2017-10-27

## 2017-10-27 RX ORDER — POTASSIUM CHLORIDE 7.45 MG/ML
10 INJECTION INTRAVENOUS
Status: COMPLETED | OUTPATIENT
Start: 2017-10-27 | End: 2017-10-27

## 2017-10-27 RX ORDER — CHLORHEXIDINE GLUCONATE 0.12 MG/ML
15 RINSE ORAL EVERY 12 HOURS SCHEDULED
Status: DISCONTINUED | OUTPATIENT
Start: 2017-10-27 | End: 2017-11-03 | Stop reason: HOSPADM

## 2017-10-27 RX ORDER — ETOMIDATE 2 MG/ML
20 INJECTION INTRAVENOUS ONCE
Status: COMPLETED | OUTPATIENT
Start: 2017-10-27 | End: 2017-10-26

## 2017-10-27 RX ORDER — MORPHINE SULFATE 1 MG/ML
4 INJECTION, SOLUTION EPIDURAL; INTRATHECAL; INTRAVENOUS
Status: DISCONTINUED | OUTPATIENT
Start: 2017-10-27 | End: 2017-10-29 | Stop reason: SDUPTHER

## 2017-10-27 RX ORDER — SODIUM CHLORIDE 9 MG/ML
INJECTION, SOLUTION INTRAVENOUS
Status: COMPLETED
Start: 2017-10-27 | End: 2017-10-27

## 2017-10-27 RX ORDER — POTASSIUM CHLORIDE 7.45 MG/ML
INJECTION INTRAVENOUS
Status: COMPLETED
Start: 2017-10-27 | End: 2017-10-27

## 2017-10-27 RX ORDER — ALBUMIN, HUMAN INJ 5% 5 %
250 SOLUTION INTRAVENOUS ONCE
Status: COMPLETED | OUTPATIENT
Start: 2017-10-27 | End: 2017-10-27

## 2017-10-27 RX ORDER — SODIUM CHLORIDE 0.9 % (FLUSH) 0.9 %
1-10 SYRINGE (ML) INJECTION AS NEEDED
Status: DISCONTINUED | OUTPATIENT
Start: 2017-10-27 | End: 2017-11-03 | Stop reason: HOSPADM

## 2017-10-27 RX ORDER — FAMOTIDINE 10 MG/ML
10 INJECTION, SOLUTION INTRAVENOUS 2 TIMES DAILY
Status: DISCONTINUED | OUTPATIENT
Start: 2017-10-27 | End: 2017-11-03 | Stop reason: HOSPADM

## 2017-10-27 RX ORDER — SODIUM CHLORIDE 9 MG/ML
INJECTION, SOLUTION INTRAVENOUS
Status: DISCONTINUED
Start: 2017-10-27 | End: 2017-11-03 | Stop reason: HOSPADM

## 2017-10-27 RX ORDER — SUCCINYLCHOLINE CHLORIDE 20 MG/ML
100 INJECTION INTRAMUSCULAR; INTRAVENOUS ONCE
Status: COMPLETED | OUTPATIENT
Start: 2017-10-27 | End: 2017-10-26

## 2017-10-27 RX ORDER — METHYLPREDNISOLONE SODIUM SUCCINATE 125 MG/2ML
80 INJECTION, POWDER, LYOPHILIZED, FOR SOLUTION INTRAMUSCULAR; INTRAVENOUS EVERY 6 HOURS
Status: DISCONTINUED | OUTPATIENT
Start: 2017-10-27 | End: 2017-11-03 | Stop reason: HOSPADM

## 2017-10-27 RX ORDER — ASPIRIN 81 MG/1
81 TABLET, CHEWABLE ORAL DAILY
Status: DISCONTINUED | OUTPATIENT
Start: 2017-10-27 | End: 2017-10-27

## 2017-10-27 RX ORDER — IPRATROPIUM BROMIDE AND ALBUTEROL SULFATE 2.5; .5 MG/3ML; MG/3ML
3 SOLUTION RESPIRATORY (INHALATION)
Status: DISCONTINUED | OUTPATIENT
Start: 2017-10-27 | End: 2017-10-31

## 2017-10-27 RX ADMIN — POTASSIUM CHLORIDE 10 MEQ: 7.46 INJECTION, SOLUTION INTRAVENOUS at 15:01

## 2017-10-27 RX ADMIN — VANCOMYCIN HYDROCHLORIDE 1 G: 1 INJECTION, POWDER, LYOPHILIZED, FOR SOLUTION INTRAVENOUS at 01:31

## 2017-10-27 RX ADMIN — POTASSIUM CHLORIDE AND SODIUM CHLORIDE 200 ML/HR: 900; 150 INJECTION, SOLUTION INTRAVENOUS at 08:50

## 2017-10-27 RX ADMIN — MIDAZOLAM 4 MG/HR: 5 INJECTION INTRAMUSCULAR; INTRAVENOUS at 05:04

## 2017-10-27 RX ADMIN — NOREPINEPHRINE BITARTRATE 0.02 MCG/KG/MIN: 1 INJECTION INTRAVENOUS at 05:04

## 2017-10-27 RX ADMIN — POTASSIUM CHLORIDE 10 MEQ: 7.46 INJECTION, SOLUTION INTRAVENOUS at 12:42

## 2017-10-27 RX ADMIN — SODIUM CHLORIDE 500 ML: 9 INJECTION, SOLUTION INTRAVENOUS at 11:21

## 2017-10-27 RX ADMIN — POTASSIUM CHLORIDE: 7.46 INJECTION, SOLUTION INTRAVENOUS at 17:13

## 2017-10-27 RX ADMIN — SODIUM CHLORIDE 1500 ML: 9 INJECTION, SOLUTION INTRAVENOUS at 00:31

## 2017-10-27 RX ADMIN — MIDAZOLAM 2 MG/HR: 5 INJECTION INTRAMUSCULAR; INTRAVENOUS at 00:15

## 2017-10-27 RX ADMIN — METHYLPREDNISOLONE SODIUM SUCCINATE 80 MG: 125 INJECTION, POWDER, FOR SOLUTION INTRAMUSCULAR; INTRAVENOUS at 03:46

## 2017-10-27 RX ADMIN — SODIUM BICARBONATE 150 ML/HR: 84 INJECTION, SOLUTION INTRAVENOUS at 20:38

## 2017-10-27 RX ADMIN — POTASSIUM CHLORIDE 10 MEQ: 7.46 INJECTION, SOLUTION INTRAVENOUS at 11:20

## 2017-10-27 RX ADMIN — MIDAZOLAM 1 MG/HR: 5 INJECTION INTRAMUSCULAR; INTRAVENOUS at 15:02

## 2017-10-27 RX ADMIN — PROPOFOL 40 MCG/KG/MIN: 10 INJECTION, EMULSION INTRAVENOUS at 03:47

## 2017-10-27 RX ADMIN — METHYLPREDNISOLONE SODIUM SUCCINATE 80 MG: 125 INJECTION, POWDER, FOR SOLUTION INTRAMUSCULAR; INTRAVENOUS at 08:50

## 2017-10-27 RX ADMIN — POTASSIUM CHLORIDE 10 MEQ: 7.46 INJECTION, SOLUTION INTRAVENOUS at 13:42

## 2017-10-27 RX ADMIN — POTASSIUM CHLORIDE 10 MEQ: 7.46 INJECTION, SOLUTION INTRAVENOUS at 15:57

## 2017-10-27 RX ADMIN — POTASSIUM CHLORIDE AND SODIUM CHLORIDE 100 ML/HR: 900; 150 INJECTION, SOLUTION INTRAVENOUS at 03:48

## 2017-10-27 RX ADMIN — CHLORHEXIDINE GLUCONATE 15 ML: 1.2 RINSE ORAL at 20:48

## 2017-10-27 RX ADMIN — SODIUM BICARBONATE 150 ML/HR: 84 INJECTION, SOLUTION INTRAVENOUS at 12:25

## 2017-10-27 RX ADMIN — FAMOTIDINE 10 MG: 10 INJECTION, SOLUTION INTRAVENOUS at 17:35

## 2017-10-27 RX ADMIN — ALBUMIN HUMAN 250 ML: 0.05 INJECTION, SOLUTION INTRAVENOUS at 11:20

## 2017-10-27 RX ADMIN — METHYLPREDNISOLONE SODIUM SUCCINATE 80 MG: 125 INJECTION, POWDER, FOR SOLUTION INTRAMUSCULAR; INTRAVENOUS at 20:48

## 2017-10-27 RX ADMIN — SODIUM CHLORIDE: 9 INJECTION, SOLUTION INTRAVENOUS at 11:22

## 2017-10-27 RX ADMIN — FAMOTIDINE 10 MG: 10 INJECTION, SOLUTION INTRAVENOUS at 08:50

## 2017-10-27 RX ADMIN — CHLORHEXIDINE GLUCONATE 15 ML: 1.2 RINSE ORAL at 08:50

## 2017-10-27 RX ADMIN — METHYLPREDNISOLONE SODIUM SUCCINATE 80 MG: 125 INJECTION, POWDER, FOR SOLUTION INTRAMUSCULAR; INTRAVENOUS at 15:57

## 2017-10-28 LAB
ABO + RH BLD: NORMAL
ABO + RH BLD: NORMAL
ALBUMIN SERPL-MCNC: 2.2 G/DL (ref 3.4–4.8)
ALBUMIN/GLOB SERPL: 0.6 G/DL (ref 1.1–1.8)
ALP SERPL-CCNC: 55 U/L (ref 38–126)
ALT SERPL W P-5'-P-CCNC: 18 U/L (ref 21–72)
ANION GAP SERPL CALCULATED.3IONS-SCNC: 17 MMOL/L (ref 5–15)
AST SERPL-CCNC: 27 U/L (ref 17–59)
BACTERIA BLD CULT: ABNORMAL
BACTERIA SPEC RESP CULT: ABNORMAL
BACTERIA SPEC RESP CULT: ABNORMAL
BACTERIA UR QL AUTO: ABNORMAL /HPF
BASOPHILS # BLD AUTO: 0.02 10*3/MM3 (ref 0–0.2)
BASOPHILS NFR BLD AUTO: 0.2 % (ref 0–2)
BH BB BLOOD EXPIRATION DATE: NORMAL
BH BB BLOOD EXPIRATION DATE: NORMAL
BH BB BLOOD TYPE BARCODE: 6200
BH BB BLOOD TYPE BARCODE: 6200
BH BB DISPENSE STATUS: NORMAL
BH BB DISPENSE STATUS: NORMAL
BH BB PRODUCT CODE: NORMAL
BH BB PRODUCT CODE: NORMAL
BH BB UNIT NUMBER: NORMAL
BH BB UNIT NUMBER: NORMAL
BILIRUB SERPL-MCNC: 0.5 MG/DL (ref 0.2–1.3)
BILIRUB UR QL STRIP: NEGATIVE
BUN BLD-MCNC: 89 MG/DL (ref 7–21)
BUN/CREAT SERPL: 14.8 (ref 7–25)
CALCIUM SPEC-SCNC: 7.3 MG/DL (ref 8.4–10.2)
CHLORIDE SERPL-SCNC: 106 MMOL/L (ref 95–110)
CK SERPL-CCNC: 104 U/L (ref 55–170)
CLARITY UR: ABNORMAL
CO2 SERPL-SCNC: 15 MMOL/L (ref 22–31)
COLOR UR: YELLOW
CREAT BLD-MCNC: 6 MG/DL (ref 0.7–1.3)
DEPRECATED RDW RBC AUTO: 42.7 FL (ref 35.1–43.9)
EOSINOPHIL # BLD AUTO: 0 10*3/MM3 (ref 0–0.7)
EOSINOPHIL NFR BLD AUTO: 0 % (ref 0–7)
ERYTHROCYTE [DISTWIDTH] IN BLOOD BY AUTOMATED COUNT: 15 % (ref 11.5–14.5)
GFR SERPL CREATININE-BSD FRML MDRD: 10 ML/MIN/1.73 (ref 49–113)
GLOBULIN UR ELPH-MCNC: 3.6 GM/DL (ref 2.3–3.5)
GLUCOSE BLD-MCNC: 138 MG/DL (ref 60–100)
GLUCOSE UR STRIP-MCNC: NEGATIVE MG/DL
GRAM STN SPEC: ABNORMAL
GRAN CASTS URNS QL MICRO: ABNORMAL /LPF
HCT VFR BLD AUTO: 29.5 % (ref 39–49)
HGB BLD-MCNC: 10 G/DL (ref 13.7–17.3)
HGB UR QL STRIP.AUTO: ABNORMAL
HYALINE CASTS UR QL AUTO: ABNORMAL /LPF
IMM GRANULOCYTES # BLD: 0.05 10*3/MM3 (ref 0–0.02)
IMM GRANULOCYTES NFR BLD: 0.4 % (ref 0–0.5)
KETONES UR QL STRIP: NEGATIVE
LEUKOCYTE ESTERASE UR QL STRIP.AUTO: ABNORMAL
LYMPHOCYTES # BLD AUTO: 0.93 10*3/MM3 (ref 0.6–4.2)
LYMPHOCYTES NFR BLD AUTO: 7.2 % (ref 10–50)
MCH RBC QN AUTO: 26.2 PG (ref 26.5–34)
MCHC RBC AUTO-ENTMCNC: 33.9 G/DL (ref 31.5–36.3)
MCV RBC AUTO: 77.2 FL (ref 80–98)
MONOCYTES # BLD AUTO: 0.37 10*3/MM3 (ref 0–0.9)
MONOCYTES NFR BLD AUTO: 2.9 % (ref 0–12)
NEUTROPHILS # BLD AUTO: 11.59 10*3/MM3 (ref 2–8.6)
NEUTROPHILS NFR BLD AUTO: 89.3 % (ref 37–80)
NITRITE UR QL STRIP: NEGATIVE
NRBC BLD MANUAL-RTO: 0 /100 WBC (ref 0–0)
PH UR STRIP.AUTO: <=5 [PH] (ref 5–9)
PLATELET # BLD AUTO: 106 10*3/MM3 (ref 150–450)
PMV BLD AUTO: 9.8 FL (ref 8–12)
POTASSIUM BLD-SCNC: 4.6 MMOL/L (ref 3.5–5.1)
PROT SERPL-MCNC: 5.8 G/DL (ref 6.3–8.6)
PROT UR QL STRIP: ABNORMAL
RBC # BLD AUTO: 3.82 10*6/MM3 (ref 4.37–5.74)
RBC # UR: ABNORMAL /HPF
REF LAB TEST METHOD: ABNORMAL
SODIUM BLD-SCNC: 138 MMOL/L (ref 137–145)
SP GR UR STRIP: 1.01 (ref 1–1.03)
SQUAMOUS #/AREA URNS HPF: ABNORMAL /HPF
UNIT  ABO: NORMAL
UNIT  ABO: NORMAL
UNIT  RH: NORMAL
UNIT  RH: NORMAL
URATE CRY URNS QL MICRO: ABNORMAL /HPF
UROBILINOGEN UR QL STRIP: ABNORMAL
VANCOMYCIN SERPL-MCNC: 11.57 MCG/ML
WBC NRBC COR # BLD: 12.96 10*3/MM3 (ref 3.2–9.8)
WBC UR QL AUTO: ABNORMAL /HPF

## 2017-10-28 PROCEDURE — 80053 COMPREHEN METABOLIC PANEL: CPT | Performed by: INTERNAL MEDICINE

## 2017-10-28 PROCEDURE — 87077 CULTURE AEROBIC IDENTIFY: CPT | Performed by: INTERNAL MEDICINE

## 2017-10-28 PROCEDURE — 80202 ASSAY OF VANCOMYCIN: CPT | Performed by: INTERNAL MEDICINE

## 2017-10-28 PROCEDURE — 87186 SC STD MICRODIL/AGAR DIL: CPT | Performed by: INTERNAL MEDICINE

## 2017-10-28 PROCEDURE — 94003 VENT MGMT INPAT SUBQ DAY: CPT

## 2017-10-28 PROCEDURE — 87150 DNA/RNA AMPLIFIED PROBE: CPT | Performed by: INTERNAL MEDICINE

## 2017-10-28 PROCEDURE — 99232 SBSQ HOSP IP/OBS MODERATE 35: CPT | Performed by: INTERNAL MEDICINE

## 2017-10-28 PROCEDURE — 82550 ASSAY OF CK (CPK): CPT | Performed by: INTERNAL MEDICINE

## 2017-10-28 PROCEDURE — 25010000002 MIDAZOLAM 50 MG/10ML SOLUTION 10 ML VIAL: Performed by: INTERNAL MEDICINE

## 2017-10-28 PROCEDURE — 25010000002 PIPERACILLIN SOD-TAZOBACTAM PER 1 G: Performed by: INTERNAL MEDICINE

## 2017-10-28 PROCEDURE — 81001 URINALYSIS AUTO W/SCOPE: CPT | Performed by: INTERNAL MEDICINE

## 2017-10-28 PROCEDURE — 25010000002 METHYLPREDNISOLONE PER 125 MG: Performed by: INTERNAL MEDICINE

## 2017-10-28 PROCEDURE — 85025 COMPLETE CBC W/AUTO DIFF WBC: CPT | Performed by: INTERNAL MEDICINE

## 2017-10-28 PROCEDURE — 87040 BLOOD CULTURE FOR BACTERIA: CPT | Performed by: INTERNAL MEDICINE

## 2017-10-28 PROCEDURE — 94799 UNLISTED PULMONARY SVC/PX: CPT

## 2017-10-28 PROCEDURE — 94760 N-INVAS EAR/PLS OXIMETRY 1: CPT

## 2017-10-28 RX ADMIN — METHYLPREDNISOLONE SODIUM SUCCINATE 80 MG: 125 INJECTION, POWDER, FOR SOLUTION INTRAMUSCULAR; INTRAVENOUS at 04:09

## 2017-10-28 RX ADMIN — MIDAZOLAM 5 MG/HR: 5 INJECTION INTRAMUSCULAR; INTRAVENOUS at 21:30

## 2017-10-28 RX ADMIN — VANCOMYCIN HYDROCHLORIDE 1000 MG: 1 INJECTION, POWDER, LYOPHILIZED, FOR SOLUTION INTRAVENOUS at 10:41

## 2017-10-28 RX ADMIN — CHLORHEXIDINE GLUCONATE 15 ML: 1.2 RINSE ORAL at 09:54

## 2017-10-28 RX ADMIN — SODIUM BICARBONATE 150 ML/HR: 84 INJECTION, SOLUTION INTRAVENOUS at 12:01

## 2017-10-28 RX ADMIN — TAZOBACTAM SODIUM AND PIPERACILLIN SODIUM 2.25 G: 250; 2 INJECTION, SOLUTION INTRAVENOUS at 13:10

## 2017-10-28 RX ADMIN — METHYLPREDNISOLONE SODIUM SUCCINATE 80 MG: 125 INJECTION, POWDER, FOR SOLUTION INTRAMUSCULAR; INTRAVENOUS at 20:26

## 2017-10-28 RX ADMIN — CHLORHEXIDINE GLUCONATE 15 ML: 1.2 RINSE ORAL at 20:26

## 2017-10-28 RX ADMIN — METHYLPREDNISOLONE SODIUM SUCCINATE 80 MG: 125 INJECTION, POWDER, FOR SOLUTION INTRAMUSCULAR; INTRAVENOUS at 09:53

## 2017-10-28 RX ADMIN — SODIUM BICARBONATE 150 ML/HR: 84 INJECTION, SOLUTION INTRAVENOUS at 04:29

## 2017-10-28 RX ADMIN — TAZOBACTAM SODIUM AND PIPERACILLIN SODIUM 2.25 G: 250; 2 INJECTION, SOLUTION INTRAVENOUS at 18:22

## 2017-10-28 RX ADMIN — FAMOTIDINE 10 MG: 10 INJECTION, SOLUTION INTRAVENOUS at 09:54

## 2017-10-28 RX ADMIN — TAZOBACTAM SODIUM AND PIPERACILLIN SODIUM 2.25 G: 250; 2 INJECTION, SOLUTION INTRAVENOUS at 06:34

## 2017-10-28 RX ADMIN — METHYLPREDNISOLONE SODIUM SUCCINATE 80 MG: 125 INJECTION, POWDER, FOR SOLUTION INTRAMUSCULAR; INTRAVENOUS at 15:40

## 2017-10-28 RX ADMIN — FAMOTIDINE 10 MG: 10 INJECTION, SOLUTION INTRAVENOUS at 18:02

## 2017-10-29 ENCOUNTER — APPOINTMENT (OUTPATIENT)
Dept: GENERAL RADIOLOGY | Facility: HOSPITAL | Age: 62
End: 2017-10-29

## 2017-10-29 LAB
ALBUMIN SERPL-MCNC: 2.3 G/DL (ref 3.4–4.8)
ALBUMIN/GLOB SERPL: 0.6 G/DL (ref 1.1–1.8)
ALP SERPL-CCNC: 47 U/L (ref 38–126)
ALT SERPL W P-5'-P-CCNC: 28 U/L (ref 21–72)
ANION GAP SERPL CALCULATED.3IONS-SCNC: 16 MMOL/L (ref 5–15)
ARTERIAL PATENCY WRIST A: ABNORMAL
AST SERPL-CCNC: 39 U/L (ref 17–59)
ATMOSPHERIC PRESS: ABNORMAL MMHG
BASE EXCESS BLDA CALC-SCNC: -3.4 MMOL/L (ref -2.4–2.4)
BASOPHILS # BLD AUTO: 0 10*3/MM3 (ref 0–0.2)
BASOPHILS NFR BLD AUTO: 0 % (ref 0–2)
BDY SITE: ABNORMAL
BILIRUB SERPL-MCNC: 0.3 MG/DL (ref 0.2–1.3)
BUN BLD-MCNC: 96 MG/DL (ref 7–21)
BUN/CREAT SERPL: 15.4 (ref 7–25)
CA-I BLD-MCNC: 3.9 MG/DL (ref 4.5–4.9)
CALCIUM SPEC-SCNC: 6.5 MG/DL (ref 8.4–10.2)
CHLORIDE SERPL-SCNC: 102 MMOL/L (ref 95–110)
CO2 BLDA-SCNC: 21.8 MMOL/L (ref 23–27)
CO2 SERPL-SCNC: 21 MMOL/L (ref 22–31)
CREAT BLD-MCNC: 6.24 MG/DL (ref 0.7–1.3)
DEPRECATED RDW RBC AUTO: 42.8 FL (ref 35.1–43.9)
EOSINOPHIL # BLD AUTO: 0 10*3/MM3 (ref 0–0.7)
EOSINOPHIL NFR BLD AUTO: 0 % (ref 0–7)
ERYTHROCYTE [DISTWIDTH] IN BLOOD BY AUTOMATED COUNT: 15.2 % (ref 11.5–14.5)
GFR SERPL CREATININE-BSD FRML MDRD: 9 ML/MIN/1.73 (ref 49–113)
GLOBULIN UR ELPH-MCNC: 3.6 GM/DL (ref 2.3–3.5)
GLUCOSE BLD-MCNC: 142 MG/DL (ref 60–100)
GLUCOSE BLDA-MCNC: 136 MMOL/L
GLUCOSE BLDC GLUCOMTR-MCNC: 135 MG/DL (ref 70–130)
HCO3 BLDA-SCNC: 20.8 MMOL/L (ref 22–26)
HCT VFR BLD AUTO: 23.5 % (ref 39–49)
HCT VFR BLD CALC: 24 % (ref 40–54)
HGB BLD-MCNC: 8.2 G/DL (ref 13.7–17.3)
HGB BLDA-MCNC: 8.3 G/DL (ref 14–18)
IMM GRANULOCYTES # BLD: 0.03 10*3/MM3 (ref 0–0.02)
IMM GRANULOCYTES NFR BLD: 0.3 % (ref 0–0.5)
KOH PREP NAIL: NORMAL
LYMPHOCYTES # BLD AUTO: 0.67 10*3/MM3 (ref 0.6–4.2)
LYMPHOCYTES NFR BLD AUTO: 5.9 % (ref 10–50)
MAGNESIUM SERPL-MCNC: 1.6 MG/DL (ref 1.6–2.3)
MCH RBC QN AUTO: 26.7 PG (ref 26.5–34)
MCHC RBC AUTO-ENTMCNC: 34.9 G/DL (ref 31.5–36.3)
MCV RBC AUTO: 76.5 FL (ref 80–98)
MODALITY: ABNORMAL
MONOCYTES # BLD AUTO: 0.41 10*3/MM3 (ref 0–0.9)
MONOCYTES NFR BLD AUTO: 3.6 % (ref 0–12)
NEUTROPHILS # BLD AUTO: 10.28 10*3/MM3 (ref 2–8.6)
NEUTROPHILS NFR BLD AUTO: 90.2 % (ref 37–80)
PCO2 BLDA: 33.8 MM HG (ref 35–45)
PH BLDA: 7.41 PH UNITS (ref 7.35–7.45)
PLATELET # BLD AUTO: 121 10*3/MM3 (ref 150–450)
PMV BLD AUTO: 9.2 FL (ref 8–12)
PO2 BLDA: 108.4 MM HG (ref 80–105)
POTASSIUM BLD-SCNC: 3.9 MMOL/L (ref 3.5–5.1)
POTASSIUM BLDA-SCNC: 3.89 MMOL/L (ref 3.6–4.9)
PROT SERPL-MCNC: 5.9 G/DL (ref 6.3–8.6)
RBC # BLD AUTO: 3.07 10*6/MM3 (ref 4.37–5.74)
SAO2 % BLDCOA: 97.3 % (ref 94–100)
SODIUM BLD-SCNC: 139 MMOL/L (ref 137–145)
SODIUM BLDA-SCNC: 136 MMOL/L (ref 138–146)
VANCOMYCIN SERPL-MCNC: 25.16 MCG/ML
WBC NRBC COR # BLD: 11.39 10*3/MM3 (ref 3.2–9.8)
WHOLE BLOOD HOLD SPECIMEN: NORMAL

## 2017-10-29 PROCEDURE — 86707 HEPATITIS BE ANTIBODY: CPT | Performed by: INTERNAL MEDICINE

## 2017-10-29 PROCEDURE — 82803 BLOOD GASES ANY COMBINATION: CPT | Performed by: INTERNAL MEDICINE

## 2017-10-29 PROCEDURE — 25010000002 METHYLPREDNISOLONE PER 125 MG: Performed by: INTERNAL MEDICINE

## 2017-10-29 PROCEDURE — 36600 WITHDRAWAL OF ARTERIAL BLOOD: CPT

## 2017-10-29 PROCEDURE — 82105 ALPHA-FETOPROTEIN SERUM: CPT | Performed by: INTERNAL MEDICINE

## 2017-10-29 PROCEDURE — 87350 HEPATITIS BE AG IA: CPT | Performed by: INTERNAL MEDICINE

## 2017-10-29 PROCEDURE — 82962 GLUCOSE BLOOD TEST: CPT

## 2017-10-29 PROCEDURE — 25010000002 CEFTRIAXONE: Performed by: INTERNAL MEDICINE

## 2017-10-29 PROCEDURE — 71010 HC CHEST PA OR AP: CPT

## 2017-10-29 PROCEDURE — 87517 HEPATITIS B DNA QUANT: CPT | Performed by: INTERNAL MEDICINE

## 2017-10-29 PROCEDURE — 80053 COMPREHEN METABOLIC PANEL: CPT | Performed by: INTERNAL MEDICINE

## 2017-10-29 PROCEDURE — 87205 SMEAR GRAM STAIN: CPT | Performed by: INTERNAL MEDICINE

## 2017-10-29 PROCEDURE — 94799 UNLISTED PULMONARY SVC/PX: CPT

## 2017-10-29 PROCEDURE — 87070 CULTURE OTHR SPECIMN AEROBIC: CPT | Performed by: INTERNAL MEDICINE

## 2017-10-29 PROCEDURE — 25010000002 MIDAZOLAM 50 MG/10ML SOLUTION 10 ML VIAL: Performed by: INTERNAL MEDICINE

## 2017-10-29 PROCEDURE — 83735 ASSAY OF MAGNESIUM: CPT | Performed by: INTERNAL MEDICINE

## 2017-10-29 PROCEDURE — 87220 TISSUE EXAM FOR FUNGI: CPT | Performed by: INTERNAL MEDICINE

## 2017-10-29 PROCEDURE — 94003 VENT MGMT INPAT SUBQ DAY: CPT

## 2017-10-29 PROCEDURE — 94760 N-INVAS EAR/PLS OXIMETRY 1: CPT

## 2017-10-29 PROCEDURE — 80202 ASSAY OF VANCOMYCIN: CPT | Performed by: INTERNAL MEDICINE

## 2017-10-29 PROCEDURE — 87522 HEPATITIS C REVRS TRNSCRPJ: CPT | Performed by: INTERNAL MEDICINE

## 2017-10-29 PROCEDURE — 25010000002 PIPERACILLIN SOD-TAZOBACTAM PER 1 G: Performed by: INTERNAL MEDICINE

## 2017-10-29 PROCEDURE — 86160 COMPLEMENT ANTIGEN: CPT | Performed by: INTERNAL MEDICINE

## 2017-10-29 PROCEDURE — 87102 FUNGUS ISOLATION CULTURE: CPT | Performed by: INTERNAL MEDICINE

## 2017-10-29 PROCEDURE — 85025 COMPLETE CBC W/AUTO DIFF WBC: CPT | Performed by: INTERNAL MEDICINE

## 2017-10-29 PROCEDURE — 99232 SBSQ HOSP IP/OBS MODERATE 35: CPT | Performed by: INTERNAL MEDICINE

## 2017-10-29 RX ORDER — MORPHINE SULFATE 8 MG/ML
4 INJECTION INTRAMUSCULAR; INTRAVENOUS; SUBCUTANEOUS
Status: DISCONTINUED | OUTPATIENT
Start: 2017-10-29 | End: 2017-11-03 | Stop reason: HOSPADM

## 2017-10-29 RX ORDER — SODIUM CHLORIDE 9 MG/ML
50 INJECTION, SOLUTION INTRAVENOUS CONTINUOUS
Status: DISCONTINUED | OUTPATIENT
Start: 2017-10-29 | End: 2017-11-03

## 2017-10-29 RX ADMIN — FAMOTIDINE 10 MG: 10 INJECTION, SOLUTION INTRAVENOUS at 18:06

## 2017-10-29 RX ADMIN — MIDAZOLAM 1 MG/HR: 5 INJECTION INTRAMUSCULAR; INTRAVENOUS at 10:06

## 2017-10-29 RX ADMIN — SODIUM BICARBONATE 150 ML/HR: 84 INJECTION, SOLUTION INTRAVENOUS at 05:08

## 2017-10-29 RX ADMIN — CEFTRIAXONE 2 G: 2 INJECTION, POWDER, FOR SOLUTION INTRAMUSCULAR; INTRAVENOUS at 22:02

## 2017-10-29 RX ADMIN — TAZOBACTAM SODIUM AND PIPERACILLIN SODIUM 2.25 G: 250; 2 INJECTION, SOLUTION INTRAVENOUS at 14:58

## 2017-10-29 RX ADMIN — METHYLPREDNISOLONE SODIUM SUCCINATE 80 MG: 125 INJECTION, POWDER, FOR SOLUTION INTRAMUSCULAR; INTRAVENOUS at 03:50

## 2017-10-29 RX ADMIN — TAZOBACTAM SODIUM AND PIPERACILLIN SODIUM 2.25 G: 250; 2 INJECTION, SOLUTION INTRAVENOUS at 09:37

## 2017-10-29 RX ADMIN — METHYLPREDNISOLONE SODIUM SUCCINATE 80 MG: 125 INJECTION, POWDER, FOR SOLUTION INTRAMUSCULAR; INTRAVENOUS at 18:06

## 2017-10-29 RX ADMIN — FAMOTIDINE 10 MG: 10 INJECTION, SOLUTION INTRAVENOUS at 09:45

## 2017-10-29 RX ADMIN — TAZOBACTAM SODIUM AND PIPERACILLIN SODIUM 2.25 G: 250; 2 INJECTION, SOLUTION INTRAVENOUS at 01:04

## 2017-10-29 RX ADMIN — CHLORHEXIDINE GLUCONATE 15 ML: 1.2 RINSE ORAL at 09:39

## 2017-10-29 RX ADMIN — SODIUM CHLORIDE 100 ML/HR: 9 INJECTION, SOLUTION INTRAVENOUS at 22:02

## 2017-10-29 RX ADMIN — METHYLPREDNISOLONE SODIUM SUCCINATE 80 MG: 125 INJECTION, POWDER, FOR SOLUTION INTRAMUSCULAR; INTRAVENOUS at 10:30

## 2017-10-29 RX ADMIN — SODIUM CHLORIDE 100 ML/HR: 9 INJECTION, SOLUTION INTRAVENOUS at 09:44

## 2017-10-29 RX ADMIN — CHLORHEXIDINE GLUCONATE 15 ML: 1.2 RINSE ORAL at 22:02

## 2017-10-29 RX ADMIN — METHYLPREDNISOLONE SODIUM SUCCINATE 80 MG: 125 INJECTION, POWDER, FOR SOLUTION INTRAMUSCULAR; INTRAVENOUS at 22:03

## 2017-10-30 ENCOUNTER — APPOINTMENT (OUTPATIENT)
Dept: GENERAL RADIOLOGY | Facility: HOSPITAL | Age: 62
End: 2017-10-30

## 2017-10-30 ENCOUNTER — APPOINTMENT (OUTPATIENT)
Dept: ULTRASOUND IMAGING | Facility: HOSPITAL | Age: 62
End: 2017-10-30

## 2017-10-30 ENCOUNTER — APPOINTMENT (OUTPATIENT)
Dept: INTERVENTIONAL RADIOLOGY/VASCULAR | Facility: HOSPITAL | Age: 62
End: 2017-10-30

## 2017-10-30 ENCOUNTER — APPOINTMENT (OUTPATIENT)
Dept: CT IMAGING | Facility: HOSPITAL | Age: 62
End: 2017-10-30

## 2017-10-30 LAB
AFP-TM SERPL-MCNC: 5.4 NG/ML (ref 0–8.3)
ALBUMIN SERPL-MCNC: 2.5 G/DL (ref 3.4–4.8)
ALBUMIN/GLOB SERPL: 0.7 G/DL (ref 1.1–1.8)
ALP SERPL-CCNC: 45 U/L (ref 38–126)
ALT SERPL W P-5'-P-CCNC: 29 U/L (ref 21–72)
ANION GAP SERPL CALCULATED.3IONS-SCNC: 16 MMOL/L (ref 5–15)
ARTERIAL PATENCY WRIST A: ABNORMAL
ARTERIAL PATENCY WRIST A: ABNORMAL
AST SERPL-CCNC: 31 U/L (ref 17–59)
ATMOSPHERIC PRESS: ABNORMAL MMHG
ATMOSPHERIC PRESS: ABNORMAL MMHG
BACTERIA BLD CULT: ABNORMAL
BASE EXCESS BLDA CALC-SCNC: -3.8 MMOL/L (ref -2.4–2.4)
BASE EXCESS BLDA CALC-SCNC: -4.6 MMOL/L (ref -2.4–2.4)
BASOPHILS # BLD AUTO: 0 10*3/MM3 (ref 0–0.2)
BASOPHILS NFR BLD AUTO: 0 % (ref 0–2)
BDY SITE: ABNORMAL
BDY SITE: ABNORMAL
BILIRUB SERPL-MCNC: 0.4 MG/DL (ref 0.2–1.3)
BUN BLD-MCNC: 100 MG/DL (ref 7–21)
BUN/CREAT SERPL: 16.5 (ref 7–25)
C3 SERPL-MCNC: 59 MG/DL (ref 82–167)
C4 SERPL-MCNC: 8 MG/DL (ref 14–44)
CA-I BLD-MCNC: 3.9 MG/DL (ref 4.5–4.9)
CA-I BLD-MCNC: 3.9 MG/DL (ref 4.5–4.9)
CALCIUM SPEC-SCNC: 6.5 MG/DL (ref 8.4–10.2)
CHLORIDE SERPL-SCNC: 104 MMOL/L (ref 95–110)
CO2 BLDA-SCNC: 20.9 MMOL/L (ref 23–27)
CO2 BLDA-SCNC: 22.9 MMOL/L (ref 23–27)
CO2 SERPL-SCNC: 20 MMOL/L (ref 22–31)
CREAT BLD-MCNC: 6.05 MG/DL (ref 0.7–1.3)
DEPRECATED RDW RBC AUTO: 43.5 FL (ref 35.1–43.9)
EOSINOPHIL # BLD AUTO: 0 10*3/MM3 (ref 0–0.7)
EOSINOPHIL NFR BLD AUTO: 0 % (ref 0–7)
ERYTHROCYTE [DISTWIDTH] IN BLOOD BY AUTOMATED COUNT: 15.2 % (ref 11.5–14.5)
GFR SERPL CREATININE-BSD FRML MDRD: 10 ML/MIN/1.73
GLOBULIN UR ELPH-MCNC: 3.8 GM/DL (ref 2.3–3.5)
GLUCOSE BLD-MCNC: 135 MG/DL (ref 60–100)
GLUCOSE BLDA-MCNC: 137 MMOL/L
GLUCOSE BLDA-MCNC: 138 MMOL/L
HBV E AB SERPL QL IA: NEGATIVE
HCO3 BLDA-SCNC: 19.9 MMOL/L (ref 22–26)
HCO3 BLDA-SCNC: 21.7 MMOL/L (ref 22–26)
HCT VFR BLD AUTO: 25.4 % (ref 39–49)
HCT VFR BLD CALC: 26 % (ref 40–54)
HCT VFR BLD CALC: 27 % (ref 40–54)
HGB BLD-MCNC: 8.8 G/DL (ref 13.7–17.3)
HGB BLDA-MCNC: 8.8 G/DL (ref 14–18)
HGB BLDA-MCNC: 9.3 G/DL (ref 14–18)
HOLD SPECIMEN: NORMAL
IMM GRANULOCYTES # BLD: 0.05 10*3/MM3 (ref 0–0.02)
IMM GRANULOCYTES NFR BLD: 0.5 % (ref 0–0.5)
LYMPHOCYTES # BLD AUTO: 0.69 10*3/MM3 (ref 0.6–4.2)
LYMPHOCYTES NFR BLD AUTO: 6.7 % (ref 10–50)
MAGNESIUM SERPL-MCNC: 1.8 MG/DL (ref 1.6–2.3)
MCH RBC QN AUTO: 26.8 PG (ref 26.5–34)
MCHC RBC AUTO-ENTMCNC: 34.6 G/DL (ref 31.5–36.3)
MCV RBC AUTO: 77.4 FL (ref 80–98)
MODALITY: ABNORMAL
MODALITY: ABNORMAL
MONOCYTES # BLD AUTO: 0.24 10*3/MM3 (ref 0–0.9)
MONOCYTES NFR BLD AUTO: 2.3 % (ref 0–12)
NEUTROPHILS # BLD AUTO: 9.27 10*3/MM3 (ref 2–8.6)
NEUTROPHILS NFR BLD AUTO: 90.5 % (ref 37–80)
PCO2 BLDA: 34.1 MM HG (ref 35–45)
PCO2 BLDA: 41.1 MM HG (ref 35–45)
PH BLDA: 7.34 PH UNITS (ref 7.35–7.45)
PH BLDA: 7.38 PH UNITS (ref 7.35–7.45)
PLATELET # BLD AUTO: 111 10*3/MM3 (ref 150–450)
PMV BLD AUTO: 9 FL (ref 8–12)
PO2 BLDA: 115 MM HG (ref 80–105)
PO2 BLDA: 29.6 MM HG (ref 80–105)
POTASSIUM BLD-SCNC: 3.8 MMOL/L (ref 3.5–5.1)
POTASSIUM BLDA-SCNC: 3.58 MMOL/L (ref 3.6–4.9)
POTASSIUM BLDA-SCNC: 3.82 MMOL/L (ref 3.6–4.9)
PROT SERPL-MCNC: 6.3 G/DL (ref 6.3–8.6)
RBC # BLD AUTO: 3.28 10*6/MM3 (ref 4.37–5.74)
SAO2 % BLDCOA: 45.1 % (ref 94–100)
SAO2 % BLDCOA: 97.5 % (ref 94–100)
SODIUM BLD-SCNC: 140 MMOL/L (ref 137–145)
SODIUM BLDA-SCNC: 136.7 MMOL/L (ref 138–146)
SODIUM BLDA-SCNC: 137.7 MMOL/L (ref 138–146)
VANCOMYCIN SERPL-MCNC: 21.11 MCG/ML
WBC NRBC COR # BLD: 10.25 10*3/MM3 (ref 3.2–9.8)

## 2017-10-30 PROCEDURE — 94003 VENT MGMT INPAT SUBQ DAY: CPT

## 2017-10-30 PROCEDURE — 94799 UNLISTED PULMONARY SVC/PX: CPT

## 2017-10-30 PROCEDURE — 83735 ASSAY OF MAGNESIUM: CPT | Performed by: INTERNAL MEDICINE

## 2017-10-30 PROCEDURE — 94760 N-INVAS EAR/PLS OXIMETRY 1: CPT

## 2017-10-30 PROCEDURE — 25010000002 CALCIUM GLUCONATE PER 10 ML: Performed by: INTERNAL MEDICINE

## 2017-10-30 PROCEDURE — 85025 COMPLETE CBC W/AUTO DIFF WBC: CPT | Performed by: INTERNAL MEDICINE

## 2017-10-30 PROCEDURE — 87340 HEPATITIS B SURFACE AG IA: CPT | Performed by: INTERNAL MEDICINE

## 2017-10-30 PROCEDURE — 80053 COMPREHEN METABOLIC PANEL: CPT | Performed by: INTERNAL MEDICINE

## 2017-10-30 PROCEDURE — 25010000002 MIDAZOLAM 50 MG/10ML SOLUTION 10 ML VIAL: Performed by: INTERNAL MEDICINE

## 2017-10-30 PROCEDURE — 25010000002 VANCOMYCIN PER 500 MG: Performed by: INTERNAL MEDICINE

## 2017-10-30 PROCEDURE — 87040 BLOOD CULTURE FOR BACTERIA: CPT | Performed by: INTERNAL MEDICINE

## 2017-10-30 PROCEDURE — 82803 BLOOD GASES ANY COMBINATION: CPT | Performed by: INTERNAL MEDICINE

## 2017-10-30 PROCEDURE — 70450 CT HEAD/BRAIN W/O DYE: CPT

## 2017-10-30 PROCEDURE — 25010000002 METHYLPREDNISOLONE PER 125 MG: Performed by: INTERNAL MEDICINE

## 2017-10-30 PROCEDURE — 25010000002 CEFTRIAXONE PER 250 MG: Performed by: INTERNAL MEDICINE

## 2017-10-30 PROCEDURE — 99232 SBSQ HOSP IP/OBS MODERATE 35: CPT | Performed by: INTERNAL MEDICINE

## 2017-10-30 PROCEDURE — 76937 US GUIDE VASCULAR ACCESS: CPT

## 2017-10-30 PROCEDURE — 71010 HC CHEST PA OR AP: CPT

## 2017-10-30 PROCEDURE — 36556 INSERT NON-TUNNEL CV CATH: CPT | Performed by: THORACIC SURGERY (CARDIOTHORACIC VASCULAR SURGERY)

## 2017-10-30 PROCEDURE — C1752 CATH,HEMODIALYSIS,SHORT-TERM: HCPCS

## 2017-10-30 PROCEDURE — 80202 ASSAY OF VANCOMYCIN: CPT | Performed by: INTERNAL MEDICINE

## 2017-10-30 PROCEDURE — 25010000002 MORPHINE SULFATE (PF) 8 MG/ML SOLUTION: Performed by: INTERNAL MEDICINE

## 2017-10-30 RX ORDER — CALCIUM GLUCONATE 94 MG/ML
1 INJECTION, SOLUTION INTRAVENOUS ONCE
Status: DISCONTINUED | OUTPATIENT
Start: 2017-10-30 | End: 2017-10-30 | Stop reason: DRUGHIGH

## 2017-10-30 RX ADMIN — FAMOTIDINE 10 MG: 10 INJECTION, SOLUTION INTRAVENOUS at 08:33

## 2017-10-30 RX ADMIN — CHLORHEXIDINE GLUCONATE 15 ML: 1.2 RINSE ORAL at 21:51

## 2017-10-30 RX ADMIN — SODIUM CHLORIDE 100 ML/HR: 9 INJECTION, SOLUTION INTRAVENOUS at 12:54

## 2017-10-30 RX ADMIN — MORPHINE SULFATE 4 MG: 8 INJECTION, SOLUTION INTRAMUSCULAR; INTRAVENOUS at 13:30

## 2017-10-30 RX ADMIN — CALCIUM GLUCONATE 1 G: 94 INJECTION, SOLUTION INTRAVENOUS at 14:50

## 2017-10-30 RX ADMIN — METHYLPREDNISOLONE SODIUM SUCCINATE 80 MG: 125 INJECTION, POWDER, FOR SOLUTION INTRAMUSCULAR; INTRAVENOUS at 17:56

## 2017-10-30 RX ADMIN — METHYLPREDNISOLONE SODIUM SUCCINATE 80 MG: 125 INJECTION, POWDER, FOR SOLUTION INTRAMUSCULAR; INTRAVENOUS at 12:58

## 2017-10-30 RX ADMIN — CHLORHEXIDINE GLUCONATE 15 ML: 1.2 RINSE ORAL at 08:45

## 2017-10-30 RX ADMIN — MIDAZOLAM 1 MG/HR: 5 INJECTION INTRAMUSCULAR; INTRAVENOUS at 03:00

## 2017-10-30 RX ADMIN — MIDAZOLAM 1 MG/HR: 5 INJECTION INTRAMUSCULAR; INTRAVENOUS at 13:01

## 2017-10-30 RX ADMIN — FAMOTIDINE 10 MG: 10 INJECTION, SOLUTION INTRAVENOUS at 17:57

## 2017-10-30 RX ADMIN — VANCOMYCIN HYDROCHLORIDE 500 MG: 1 INJECTION, POWDER, LYOPHILIZED, FOR SOLUTION INTRAVENOUS at 18:02

## 2017-10-30 RX ADMIN — SODIUM BICARBONATE 100 ML/HR: 84 INJECTION, SOLUTION INTRAVENOUS at 10:35

## 2017-10-30 RX ADMIN — METHYLPREDNISOLONE SODIUM SUCCINATE 80 MG: 125 INJECTION, POWDER, FOR SOLUTION INTRAMUSCULAR; INTRAVENOUS at 05:58

## 2017-10-30 RX ADMIN — SODIUM CHLORIDE 100 ML/HR: 9 INJECTION, SOLUTION INTRAVENOUS at 14:33

## 2017-10-30 RX ADMIN — SODIUM CHLORIDE 1000 ML: 900 INJECTION, SOLUTION INTRAVENOUS at 12:58

## 2017-10-30 RX ADMIN — CEFTRIAXONE 2 G: 2 INJECTION, POWDER, FOR SOLUTION INTRAMUSCULAR; INTRAVENOUS at 21:51

## 2017-10-30 RX ADMIN — SODIUM CHLORIDE 100 ML/HR: 9 INJECTION, SOLUTION INTRAVENOUS at 08:45

## 2017-10-31 ENCOUNTER — APPOINTMENT (OUTPATIENT)
Dept: GENERAL RADIOLOGY | Facility: HOSPITAL | Age: 62
End: 2017-10-31

## 2017-10-31 LAB
ALBUMIN SERPL-MCNC: 2.2 G/DL (ref 3.4–4.8)
ALBUMIN/GLOB SERPL: 0.7 G/DL (ref 1.1–1.8)
ALP SERPL-CCNC: 41 U/L (ref 38–126)
ALT SERPL W P-5'-P-CCNC: 29 U/L (ref 21–72)
ANION GAP SERPL CALCULATED.3IONS-SCNC: 11 MMOL/L (ref 5–15)
ARTERIAL PATENCY WRIST A: ABNORMAL
ARTERIAL PATENCY WRIST A: ABNORMAL
AST SERPL-CCNC: 32 U/L (ref 17–59)
ATMOSPHERIC PRESS: ABNORMAL MMHG
ATMOSPHERIC PRESS: ABNORMAL MMHG
BACTERIA SPEC RESP CULT: NORMAL
BASE EXCESS BLDA CALC-SCNC: -1.5 MMOL/L (ref -2.4–2.4)
BASE EXCESS BLDA CALC-SCNC: 0.8 MMOL/L (ref -2.4–2.4)
BASOPHILS # BLD AUTO: 0.01 10*3/MM3 (ref 0–0.2)
BASOPHILS NFR BLD AUTO: 0.1 % (ref 0–2)
BDY SITE: ABNORMAL
BDY SITE: ABNORMAL
BILIRUB SERPL-MCNC: 0.2 MG/DL (ref 0.2–1.3)
BUN BLD-MCNC: 59 MG/DL (ref 7–21)
BUN/CREAT SERPL: 15.2 (ref 7–25)
CA-I BLD-MCNC: 4.2 MG/DL (ref 4.5–4.9)
CA-I BLD-MCNC: 4.3 MG/DL (ref 4.5–4.9)
CALCIUM SPEC-SCNC: 6.7 MG/DL (ref 8.4–10.2)
CHLORIDE SERPL-SCNC: 106 MMOL/L (ref 95–110)
CO2 BLDA-SCNC: 24.6 MMOL/L (ref 23–27)
CO2 BLDA-SCNC: 26 MMOL/L (ref 23–27)
CO2 SERPL-SCNC: 21 MMOL/L (ref 22–31)
CREAT BLD-MCNC: 3.89 MG/DL (ref 0.7–1.3)
DEPRECATED RDW RBC AUTO: 44.6 FL (ref 35.1–43.9)
EOSINOPHIL # BLD AUTO: 0 10*3/MM3 (ref 0–0.7)
EOSINOPHIL NFR BLD AUTO: 0 % (ref 0–7)
ERYTHROCYTE [DISTWIDTH] IN BLOOD BY AUTOMATED COUNT: 15.4 % (ref 11.5–14.5)
GFR SERPL CREATININE-BSD FRML MDRD: 16 ML/MIN/1.73 (ref 60–113)
GLOBULIN UR ELPH-MCNC: 3.3 GM/DL (ref 2.3–3.5)
GLUCOSE BLD-MCNC: 141 MG/DL (ref 60–100)
GLUCOSE BLDA-MCNC: 144 MMOL/L
GLUCOSE BLDA-MCNC: 165 MMOL/L
GRAM STN SPEC: NORMAL
HBV DNA SERPL NAA+PROBE-ACNC: NORMAL IU/ML
HBV DNA SERPL NAA+PROBE-ACNC: NORMAL IU/ML
HCO3 BLDA-SCNC: 23.4 MMOL/L (ref 22–26)
HCO3 BLDA-SCNC: 24.9 MMOL/L (ref 22–26)
HCT VFR BLD AUTO: 23.1 % (ref 39–49)
HCT VFR BLD CALC: 27 % (ref 40–54)
HCT VFR BLD CALC: 30 % (ref 40–54)
HCV RNA SERPL NAA+PROBE-ACNC: NORMAL IU/ML
HGB BLD-MCNC: 7.8 G/DL (ref 13.7–17.3)
HGB BLDA-MCNC: 10.1 G/DL (ref 14–18)
HGB BLDA-MCNC: 9.1 G/DL (ref 14–18)
IMM GRANULOCYTES # BLD: 0.04 10*3/MM3 (ref 0–0.02)
IMM GRANULOCYTES NFR BLD: 0.5 % (ref 0–0.5)
LOG10 HBV IU/ML: 8.82 LOG10IU/ML
LYMPHOCYTES # BLD AUTO: 0.68 10*3/MM3 (ref 0.6–4.2)
LYMPHOCYTES NFR BLD AUTO: 8.6 % (ref 10–50)
MAGNESIUM SERPL-MCNC: 1.8 MG/DL (ref 1.6–2.3)
MCH RBC QN AUTO: 26.6 PG (ref 26.5–34)
MCHC RBC AUTO-ENTMCNC: 33.8 G/DL (ref 31.5–36.3)
MCV RBC AUTO: 78.8 FL (ref 80–98)
MODALITY: ABNORMAL
MODALITY: ABNORMAL
MONOCYTES # BLD AUTO: 0.23 10*3/MM3 (ref 0–0.9)
MONOCYTES NFR BLD AUTO: 2.9 % (ref 0–12)
NEUTROPHILS # BLD AUTO: 6.92 10*3/MM3 (ref 2–8.6)
NEUTROPHILS NFR BLD AUTO: 87.9 % (ref 37–80)
NRBC BLD MANUAL-RTO: 0 /100 WBC (ref 0–0)
PCO2 BLDA: 37.7 MM HG (ref 35–45)
PCO2 BLDA: 40 MM HG (ref 35–45)
PH BLDA: 7.38 PH UNITS (ref 7.35–7.45)
PH BLDA: 7.44 PH UNITS (ref 7.35–7.45)
PHOSPHATE SERPL-MCNC: 7.1 MG/DL (ref 2.4–4.4)
PLATELET # BLD AUTO: 97 10*3/MM3 (ref 150–450)
PMV BLD AUTO: 9.3 FL (ref 8–12)
PO2 BLDA: 123.9 MM HG (ref 80–105)
PO2 BLDA: 68.3 MM HG (ref 80–105)
POTASSIUM BLD-SCNC: 3.5 MMOL/L (ref 3.5–5.1)
POTASSIUM BLDA-SCNC: 3.27 MMOL/L (ref 3.6–4.9)
POTASSIUM BLDA-SCNC: 3.52 MMOL/L (ref 3.6–4.9)
PROT SERPL-MCNC: 5.5 G/DL (ref 6.3–8.6)
RBC # BLD AUTO: 2.93 10*6/MM3 (ref 4.37–5.74)
SAO2 % BLDCOA: 92.4 %
SAO2 % BLDCOA: 98 % (ref 94–100)
SODIUM BLD-SCNC: 138 MMOL/L (ref 137–145)
SODIUM BLDA-SCNC: 135.1 MMOL/L (ref 138–146)
SODIUM BLDA-SCNC: 136.7 MMOL/L (ref 138–146)
TEST INFORMATION: NORMAL
TEST INFORMATION: NORMAL
VANCOMYCIN SERPL-MCNC: 23.08 MCG/ML
WBC NRBC COR # BLD: 7.88 10*3/MM3 (ref 3.2–9.8)

## 2017-10-31 PROCEDURE — 99232 SBSQ HOSP IP/OBS MODERATE 35: CPT | Performed by: INTERNAL MEDICINE

## 2017-10-31 PROCEDURE — 94799 UNLISTED PULMONARY SVC/PX: CPT

## 2017-10-31 PROCEDURE — 94760 N-INVAS EAR/PLS OXIMETRY 1: CPT

## 2017-10-31 PROCEDURE — 25010000002 CEFTRIAXONE: Performed by: INTERNAL MEDICINE

## 2017-10-31 PROCEDURE — 25010000002 VANCOMYCIN PER 500 MG: Performed by: INTERNAL MEDICINE

## 2017-10-31 PROCEDURE — 06HM33Z INSERTION OF INFUSION DEVICE INTO RIGHT FEMORAL VEIN, PERCUTANEOUS APPROACH: ICD-10-PCS | Performed by: THORACIC SURGERY (CARDIOTHORACIC VASCULAR SURGERY)

## 2017-10-31 PROCEDURE — B54BZZA ULTRASONOGRAPHY OF RIGHT LOWER EXTREMITY VEINS, GUIDANCE: ICD-10-PCS | Performed by: THORACIC SURGERY (CARDIOTHORACIC VASCULAR SURGERY)

## 2017-10-31 PROCEDURE — 80202 ASSAY OF VANCOMYCIN: CPT | Performed by: INTERNAL MEDICINE

## 2017-10-31 PROCEDURE — 85025 COMPLETE CBC W/AUTO DIFF WBC: CPT | Performed by: INTERNAL MEDICINE

## 2017-10-31 PROCEDURE — 36600 WITHDRAWAL OF ARTERIAL BLOOD: CPT

## 2017-10-31 PROCEDURE — 94003 VENT MGMT INPAT SUBQ DAY: CPT

## 2017-10-31 PROCEDURE — 82803 BLOOD GASES ANY COMBINATION: CPT | Performed by: INTERNAL MEDICINE

## 2017-10-31 PROCEDURE — 71010 HC CHEST PA OR AP: CPT

## 2017-10-31 PROCEDURE — 80053 COMPREHEN METABOLIC PANEL: CPT | Performed by: INTERNAL MEDICINE

## 2017-10-31 PROCEDURE — 25010000002 METHYLPREDNISOLONE PER 125 MG: Performed by: INTERNAL MEDICINE

## 2017-10-31 PROCEDURE — 83735 ASSAY OF MAGNESIUM: CPT | Performed by: INTERNAL MEDICINE

## 2017-10-31 PROCEDURE — 84100 ASSAY OF PHOSPHORUS: CPT | Performed by: INTERNAL MEDICINE

## 2017-10-31 RX ADMIN — CHLORHEXIDINE GLUCONATE 15 ML: 1.2 RINSE ORAL at 08:15

## 2017-10-31 RX ADMIN — FAMOTIDINE 10 MG: 10 INJECTION, SOLUTION INTRAVENOUS at 18:14

## 2017-10-31 RX ADMIN — METHYLPREDNISOLONE SODIUM SUCCINATE 80 MG: 125 INJECTION, POWDER, FOR SOLUTION INTRAMUSCULAR; INTRAVENOUS at 00:32

## 2017-10-31 RX ADMIN — FAMOTIDINE 10 MG: 10 INJECTION, SOLUTION INTRAVENOUS at 08:15

## 2017-10-31 RX ADMIN — METHYLPREDNISOLONE SODIUM SUCCINATE 80 MG: 125 INJECTION, POWDER, FOR SOLUTION INTRAMUSCULAR; INTRAVENOUS at 12:20

## 2017-10-31 RX ADMIN — METHYLPREDNISOLONE SODIUM SUCCINATE 80 MG: 125 INJECTION, POWDER, FOR SOLUTION INTRAMUSCULAR; INTRAVENOUS at 18:13

## 2017-10-31 RX ADMIN — CHLORHEXIDINE GLUCONATE 15 ML: 1.2 RINSE ORAL at 20:18

## 2017-10-31 RX ADMIN — METHYLPREDNISOLONE SODIUM SUCCINATE 80 MG: 125 INJECTION, POWDER, FOR SOLUTION INTRAMUSCULAR; INTRAVENOUS at 23:21

## 2017-10-31 RX ADMIN — SODIUM CHLORIDE 50 ML/HR: 9 INJECTION, SOLUTION INTRAVENOUS at 08:18

## 2017-10-31 RX ADMIN — METHYLPREDNISOLONE SODIUM SUCCINATE 80 MG: 125 INJECTION, POWDER, FOR SOLUTION INTRAMUSCULAR; INTRAVENOUS at 05:38

## 2017-10-31 RX ADMIN — VANCOMYCIN HYDROCHLORIDE 500 MG: 5 INJECTION, POWDER, LYOPHILIZED, FOR SOLUTION INTRAVENOUS at 18:33

## 2017-10-31 RX ADMIN — CEFTRIAXONE 2 G: 2 INJECTION, POWDER, FOR SOLUTION INTRAMUSCULAR; INTRAVENOUS at 21:06

## 2017-11-01 ENCOUNTER — APPOINTMENT (OUTPATIENT)
Dept: GENERAL RADIOLOGY | Facility: HOSPITAL | Age: 62
End: 2017-11-01

## 2017-11-01 ENCOUNTER — APPOINTMENT (OUTPATIENT)
Dept: CT IMAGING | Facility: HOSPITAL | Age: 62
End: 2017-11-01

## 2017-11-01 LAB
ALBUMIN SERPL-MCNC: 2.4 G/DL (ref 3.4–4.8)
ALBUMIN/GLOB SERPL: 0.7 G/DL (ref 1.1–1.8)
ALP SERPL-CCNC: 50 U/L (ref 38–126)
ALT SERPL W P-5'-P-CCNC: 25 U/L (ref 21–72)
ANION GAP SERPL CALCULATED.3IONS-SCNC: 9 MMOL/L (ref 5–15)
AST SERPL-CCNC: 35 U/L (ref 17–59)
BACTERIA SPEC AEROBE CULT: ABNORMAL
BASOPHILS # BLD AUTO: 0 10*3/MM3 (ref 0–0.2)
BASOPHILS NFR BLD AUTO: 0 % (ref 0–2)
BILIRUB SERPL-MCNC: 0.5 MG/DL (ref 0.2–1.3)
BUN BLD-MCNC: 39 MG/DL (ref 7–21)
BUN/CREAT SERPL: 14.7 (ref 7–25)
CALCIUM SPEC-SCNC: 7.6 MG/DL (ref 8.4–10.2)
CHLORIDE SERPL-SCNC: 106 MMOL/L (ref 95–110)
CO2 SERPL-SCNC: 24 MMOL/L (ref 22–31)
CREAT BLD-MCNC: 2.65 MG/DL (ref 0.7–1.3)
DEPRECATED RDW RBC AUTO: 42.5 FL (ref 35.1–43.9)
EOSINOPHIL # BLD AUTO: 0 10*3/MM3 (ref 0–0.7)
EOSINOPHIL NFR BLD AUTO: 0 % (ref 0–7)
ERYTHROCYTE [DISTWIDTH] IN BLOOD BY AUTOMATED COUNT: 14.7 % (ref 11.5–14.5)
GFR SERPL CREATININE-BSD FRML MDRD: 25 ML/MIN/1.73 (ref 60–113)
GLOBULIN UR ELPH-MCNC: 3.6 GM/DL (ref 2.3–3.5)
GLUCOSE BLD-MCNC: 144 MG/DL (ref 60–100)
GRAM STN SPEC: ABNORMAL
HBV SURFACE AG SERPL QL IA: POSITIVE
HCT VFR BLD AUTO: 23.1 % (ref 39–49)
HGB BLD-MCNC: 8 G/DL (ref 13.7–17.3)
IMM GRANULOCYTES # BLD: 0.04 10*3/MM3 (ref 0–0.02)
IMM GRANULOCYTES NFR BLD: 0.4 % (ref 0–0.5)
ISOLATED FROM: ABNORMAL
LYMPHOCYTES # BLD AUTO: 0.58 10*3/MM3 (ref 0.6–4.2)
LYMPHOCYTES NFR BLD AUTO: 6 % (ref 10–50)
MAGNESIUM SERPL-MCNC: 1.8 MG/DL (ref 1.6–2.3)
MCH RBC QN AUTO: 26.8 PG (ref 26.5–34)
MCHC RBC AUTO-ENTMCNC: 34.6 G/DL (ref 31.5–36.3)
MCV RBC AUTO: 77.3 FL (ref 80–98)
MONOCYTES # BLD AUTO: 0.57 10*3/MM3 (ref 0–0.9)
MONOCYTES NFR BLD AUTO: 5.9 % (ref 0–12)
NEUTROPHILS # BLD AUTO: 8.4 10*3/MM3 (ref 2–8.6)
NEUTROPHILS NFR BLD AUTO: 87.7 % (ref 37–80)
PHOSPHATE SERPL-MCNC: 5.5 MG/DL (ref 2.4–4.4)
PLATELET # BLD AUTO: 100 10*3/MM3 (ref 150–450)
PMV BLD AUTO: 9.6 FL (ref 8–12)
POTASSIUM BLD-SCNC: 3.5 MMOL/L (ref 3.5–5.1)
PROT SERPL-MCNC: 6 G/DL (ref 6.3–8.6)
RBC # BLD AUTO: 2.99 10*6/MM3 (ref 4.37–5.74)
SODIUM BLD-SCNC: 139 MMOL/L (ref 137–145)
WBC NRBC COR # BLD: 9.59 10*3/MM3 (ref 3.2–9.8)

## 2017-11-01 PROCEDURE — 99232 SBSQ HOSP IP/OBS MODERATE 35: CPT | Performed by: INTERNAL MEDICINE

## 2017-11-01 PROCEDURE — 84100 ASSAY OF PHOSPHORUS: CPT | Performed by: INTERNAL MEDICINE

## 2017-11-01 PROCEDURE — 83735 ASSAY OF MAGNESIUM: CPT | Performed by: INTERNAL MEDICINE

## 2017-11-01 PROCEDURE — 94799 UNLISTED PULMONARY SVC/PX: CPT

## 2017-11-01 PROCEDURE — 85025 COMPLETE CBC W/AUTO DIFF WBC: CPT | Performed by: INTERNAL MEDICINE

## 2017-11-01 PROCEDURE — 94760 N-INVAS EAR/PLS OXIMETRY 1: CPT

## 2017-11-01 PROCEDURE — 25010000002 METHYLPREDNISOLONE PER 125 MG: Performed by: INTERNAL MEDICINE

## 2017-11-01 PROCEDURE — 70450 CT HEAD/BRAIN W/O DYE: CPT

## 2017-11-01 PROCEDURE — 80053 COMPREHEN METABOLIC PANEL: CPT | Performed by: INTERNAL MEDICINE

## 2017-11-01 PROCEDURE — 71010 HC CHEST PA OR AP: CPT

## 2017-11-01 PROCEDURE — 25010000002 CEFTRIAXONE: Performed by: INTERNAL MEDICINE

## 2017-11-01 RX ORDER — ALBUTEROL SULFATE 2.5 MG/3ML
2.5 SOLUTION RESPIRATORY (INHALATION)
Status: DISCONTINUED | OUTPATIENT
Start: 2017-11-01 | End: 2017-11-03 | Stop reason: HOSPADM

## 2017-11-01 RX ADMIN — ALBUTEROL SULFATE 2.5 MG: 2.5 SOLUTION RESPIRATORY (INHALATION) at 19:14

## 2017-11-01 RX ADMIN — AMPICILLIN SODIUM 2 G: 2 INJECTION, POWDER, FOR SOLUTION INTRAVENOUS at 12:04

## 2017-11-01 RX ADMIN — CEFTRIAXONE 2 G: 2 INJECTION, POWDER, FOR SOLUTION INTRAMUSCULAR; INTRAVENOUS at 22:31

## 2017-11-01 RX ADMIN — METHYLPREDNISOLONE SODIUM SUCCINATE 80 MG: 125 INJECTION, POWDER, FOR SOLUTION INTRAMUSCULAR; INTRAVENOUS at 14:46

## 2017-11-01 RX ADMIN — SODIUM CHLORIDE 50 ML/HR: 9 INJECTION, SOLUTION INTRAVENOUS at 03:06

## 2017-11-01 RX ADMIN — METHYLPREDNISOLONE SODIUM SUCCINATE 80 MG: 125 INJECTION, POWDER, FOR SOLUTION INTRAMUSCULAR; INTRAVENOUS at 19:40

## 2017-11-01 RX ADMIN — FAMOTIDINE 10 MG: 10 INJECTION, SOLUTION INTRAVENOUS at 08:18

## 2017-11-01 RX ADMIN — METHYLPREDNISOLONE SODIUM SUCCINATE 80 MG: 125 INJECTION, POWDER, FOR SOLUTION INTRAMUSCULAR; INTRAVENOUS at 08:18

## 2017-11-01 RX ADMIN — FAMOTIDINE 10 MG: 10 INJECTION, SOLUTION INTRAVENOUS at 19:44

## 2017-11-01 RX ADMIN — CEFTRIAXONE 2 G: 2 INJECTION, POWDER, FOR SOLUTION INTRAMUSCULAR; INTRAVENOUS at 15:44

## 2017-11-01 RX ADMIN — CHLORHEXIDINE GLUCONATE 15 ML: 1.2 RINSE ORAL at 20:09

## 2017-11-01 RX ADMIN — AMPICILLIN SODIUM 2 G: 2 INJECTION, POWDER, FOR SOLUTION INTRAVENOUS at 20:09

## 2017-11-01 RX ADMIN — METHYLPREDNISOLONE SODIUM SUCCINATE 80 MG: 125 INJECTION, POWDER, FOR SOLUTION INTRAMUSCULAR; INTRAVENOUS at 23:06

## 2017-11-01 RX ADMIN — ALBUTEROL SULFATE 2.5 MG: 2.5 SOLUTION RESPIRATORY (INHALATION) at 12:41

## 2017-11-01 NOTE — PROGRESS NOTES
"Intensive Care Follow-up      LOS: 5 days     Mr. Héctor Koo, 62 y.o. male is followed for: Pneumonia of both lungs due to infectious organism   CHIEF COMPLIANTShortness of breath    Subjective - Interval History     This gentleman has pneumonia presented with a septic picture and he was on a mechanical ventilator but self extubated last p.m.  He is now arousable and moves only his right arm at this point, he does respond    The patient's relevant past medical, surgical and social history were reviewed and updated in Epic as appropriate.     Objective   /77  Pulse (!) 40  Temp 97.7 °F (36.5 °C) (Oral)   Resp 9  Ht 67\" (170.2 cm)  Wt 133 lb 11.2 oz (60.6 kg)  SpO2 100%  BMI 20.94 kg/m2      Vent Settings: Vent Mode: SIMV/VC+  FiO2 (%):  [30 %] 30 %  S RR:  [6] 6  PEEP/CPAP (cm H2O):  [5 cm H20] 5 cm H20  CA SUP:  [15 cm H20] 15 cm H20  MAP (cm H2O):  [8-12] 12    Physical Exam:Lethargic slurred speech and decreased movement of the left side in no respiratory distress    General Appearance:       Head:    Normocephalic, without obvious abnormality, atraumatic   Eyes:            Lids and lashes normal, conjunctivae and sclerae normal, no   icterus, no pallor, corneas clear, PERRLA   Ears:    Ears appear intact with no abnormalities noted   Throat:   No oral lesions, no thrush, oral mucosa moist   Neck:   No adenopathy, supple, trachea midline, no thyromegaly, no   carotid bruit, no JVD   Back:     No kyphosis present, no scoliosis present, no skin lesions,      erythema or scars, no tenderness to percussion or                   palpation,   range of motion normal   Lungs:       Heart:    Regular rhythm and normal rate, normal S1 and S2, no            murmur, no gallop, no rub, no click   Chest Wall:    No abnormalities observed   Abdomen:     Normal bowel sounds, no masses, no organomegaly, soft        non-tender, non-distended, no guarding, no rebound                tenderness   Rectal:     " Deferred   Extremities:   Moves all extremities well, no edema, no cyanosis, no             redness   Pulses:   Pulses palpable and equal bilaterally   Skin:   No bleeding, bruising or rash   Lymph nodes:   No palpable adenopathy   Neurologic:   Cranial nerves 2 - 12 grossly intact, sensation intact, DTR       present and equal bilaterally     LAB:    pH, Arterial   Date Value Ref Range Status   10/31/2017 7.437 7.350 - 7.450 pH units Final     pCO2, Arterial   Date Value Ref Range Status   10/31/2017 37.7 35.0 - 45.0 mm Hg Final     pO2, Arterial   Date Value Ref Range Status   10/31/2017 68.3 (L) 80.0 - 105.0 mm Hg Final     Lab Results   Component Value Date    WBC 9.59 11/01/2017    HGB 8.0 (L) 11/01/2017    HCT 23.1 (L) 11/01/2017    MCV 77.3 (L) 11/01/2017     (L) 11/01/2017     Lab Results   Component Value Date    GLUCOSE 144 (H) 11/01/2017    BUN 39 (H) 11/01/2017    CREATININE 2.65 (H) 11/01/2017    EGFRIFNONA 25 (L) 11/01/2017    BCR 14.7 11/01/2017    CO2 24.0 11/01/2017    CALCIUM 7.6 (L) 11/01/2017    ALBUMIN 2.40 (L) 11/01/2017    LABIL2 0.7 (L) 11/01/2017    AST 35 11/01/2017    ALT 25 11/01/2017         RAD:   Imaging Results (last 24 hours)     Procedure Component Value Units Date/Time    XR Chest 1 View [[id number]] Collected:  10/31/17 0509     Updated:  10/31/17 0923    Narrative:         PROCEDURE: Single chest view AP    REASON FOR EXAM:intubated, J18.9 Pneumonia, unspecified organism  J96.90 Respiratory failure, unspecified, unspecified whether with  hypoxia or hypercapnia D72.829 Elevated white blood cell count,  unspecified E87.6 Hypokalemia N17.9 Acute kidney failure,  unspecified E86.0 Dehydration R74.8 Abnormal levels of other  serum enzymes R41.82 Altered mental status, unspecified B18.2  Chronic viral hepatitis C N19 Unspecifie    FINDINGS: Comparison study dated October 30, 2017. ET tube with  tip 3.3 cm above betsy. NG tube with tip below level diaphragm.  Cardiac and  pulmonary vasculature are normal. Stable bilateral  apical lung parenchymal scarring. Lungs are otherwise clear.  Pleural spaces are normal. No acute osseous abnormality.      Impression:       No acute cardiopulmonary abnormality.    Electronically signed by:  Shan Leslie MD  10/31/2017 9:21 AM CDT  Workstation: [id number]    IR insert non-tunneled CVC 5+ [[id number]] Resulted:  10/31/17 1017     Updated:  10/31/17 1018    Narrative:       OPERATIVE NOTE  Héctor Altamirano Hayes  1955  10/30/2017    PREOP DIAGNOSES:  Acute kidney injury requiring short term hemodialysis.    POSTOP DIAGNOSES:   Acute kidney injury requiring short term hemodialysis.    PROCEDURE:   Placement non-tunnelled central venous catheter (13Fr trialysis)  Vascular ultrasound guidance    SURGEON: BRETT Clancy MD FACS RPVI    ASSISTANT: Julian Fox RT     ANESTHESIA: local 1% lidocaine    COMPLICATIONS: none    DESCRIPTION OF OPERATION: In CCU, patient placed in supine position,   prepped and draped in sterile fashion.  Vascular ultrasound was used to   identify the right common femoral vein which was 10mm in diameter and   patent.  Cannulated via modified Seldinger technique with mini stick under   ultrasound guidance.  Exchanged for a 0.035 guidewire and serial dilators.    A 13Fr Trialysis catheter was placed, aspirated and flushed without   difficulty with Hep-Lock solution.  1000U/mL heparin instilled into   lumens. catheter secured to the skin with 2-0 nylon suture.  Sterile   dressing applied. patient tolerated procedure well. dialysis notified.             This document has been electronically signed by Salvador Clancy MD   on October 31, 2017 10:18 AM           XR Chest 1 View [[id number]] Updated:  11/01/17 0531         Impression      Hospital Problem List     * (Principal)Pneumonia of both lungs due to infectious organism    Decompensated COPD with exacerbation (chronic obstructive pulmonary disease) (Chronic)    Chronic  pain (Chronic)    Acute on chronic respiratory failure with hypoxia and hypercapnia (Chronic)    Acute renal failure (ARF)    Hypokalemia    Cachexia (Chronic)    Anemia (Chronic)    GI bleeding               Plan        Assessment respiratory failure resolved, neurologic deficit left side, bilateral apical pleural densities and pneumonia    Recommendations no treatments oxygen and follow chest x-ray    I discussed the patient's findings and my recommendations with patient and nursing staff

## 2017-11-01 NOTE — PROGRESS NOTES
Toni Ling DO,[facility]  Gastroenterology  Hepatology  Endoscopy  Board Certified in Internal Medicine and gastroenterology  [address], [address]  [place]. [address]  T- [phone]   F - [phone]     GASTROENTEROLOGY PROGRESS NOTE   TONI LING DO.         SUBJECTIVE:   10/31/2017  Chief Complaint:     Subjective  : There has been no evidence of any active ongoing gastrointestinal bleeding.  Currently is receiving tube feedings.  No melena.  No distention of the abdomen.  No changes as far as the patient's level of minimal activity.  Patient continues to be quite somnolent.         CURRENT MEDICATIONS/OBJECTIVE/VS/PE:     Current Medications:     Current Facility-Administered Medications   Medication Dose Route Frequency Provider Last Rate Last Dose   • cefTRIAXone (ROCEPHIN) 2 g/100 mL 0.9% NS VTB (ISAEL)  2 g Intravenous Q24H Katie Chambers MD   2 g at 10/30/17 2151   • chlorhexidine (PERIDEX) 0.12 % solution 15 mL  15 mL Mouth/Throat Q12H Jaya Harrison MD   15 mL at 10/31/17 2018   • famotidine (PEPCID) injection 10 mg  10 mg Intravenous BID Jaya Harrison MD   10 mg at 10/31/17 1814   • influenza vac split quad (FLUZONE QUADRIVALENT) IM suspension 0.5 mL  0.5 mL Intramuscular During Hospitalization Jaya Harrison MD       • methylPREDNISolone sodium succinate (SOLU-Medrol) injection 80 mg  80 mg Intravenous Q6H Jaya Harrison MD   80 mg at 10/31/17 1813   • midazolam (VERSED) 50 mg in sodium chloride 0.9 % 100 mL (0.5 mg/mL) infusion  1 mg/hr Intravenous Titrated Jaya Harrison MD   Stopped at 10/31/17 0545   • morphine injection 4 mg  4 mg Intravenous Q1H PRN Jaya Harrison MD   4 mg at 10/30/17 1330   • norepinephrine (LEVOPHED) 8,000 mcg in sodium chloride 0.9 % 250 mL (32 mcg/mL) infusion  0.02-0.3 mcg/kg/min Intravenous Titrated Jaya Harrison MD   Stopped at 10/27/17 0814   • Pharmacy to dose vancomycin   Does not apply Continuous PRN Katie Chambers MD       • pneumococcal  polysaccharide 23-valent (PNEUMOVAX-23) vaccine 0.5 mL  0.5 mL Intramuscular During Hospitalization Jaya Harrison MD       • sodium chloride 0.9 % bolus 100 mL  100 mL Intravenous PRN James Mcmanus MD       • sodium chloride 0.9 % bolus 100 mL  100 mL Intravenous PRN James Mcmanus MD       • sodium chloride 0.9 % bolus 500 mL  500 mL Intravenous Once James Mcmanus MD   Stopped at 10/30/17 1100   • sodium chloride 0.9 % flush 1-10 mL  1-10 mL Intravenous PRN Jaya Harrison MD       • sodium chloride 0.9 % infusion  50 mL/hr Intravenous Continuous James Mcmanus MD 50 mL/hr at 10/31/17 0818 50 mL/hr at 10/31/17 0818       Objective     Physical Exam:   Temp:  [97.4 °F (36.3 °C)-98.3 °F (36.8 °C)] 97.6 °F (36.4 °C)  Heart Rate:  [38-73] 67  Resp:  [8-16] 9  BP: (103-142)/(59-88) 138/80  FiO2 (%):  [30 %] 30 %     Physical Exam:  General Appearance:    Nonverbal, intubated, little spontaneous eye movement.     Head:    Normocephalic, without obvious abnormality, atraumatic   Eyes:            Lids and lashes normal, conjunctivae and sclerae normal, no   icterus, no pallor, corneas clear, PERRLA   Ears:    Ears appear intact with no abnormalities noted   Throat:   No oral lesions, no thrush, oral mucosa moist   Neck:   No adenopathy, supple, trachea midline, no thyromegaly, no     carotid bruit, no JVD   Back:     No kyphosis present, no scoliosis present, no skin lesions,       erythema or scars, no tenderness to percussion or                   palpation,   range of motion normal   Lungs:     Clear to auscultation,respirations regular, even and                   unlabored    Heart:    Regular rhythm and normal rate, normal S1 and S2, no            murmur, no gallop, no rub, no click   Breast Exam:    Deferred   Abdomen:     Normal bowel sounds, no masses, no organomegaly, soft        non-tender, non-distended, no guarding, no rebound                 tenderness   Genitalia:    Deferred   Extremities:   Moves all  extremities well, no edema, no cyanosis, no              redness   Pulses:   Pulses palpable and equal bilaterally   Skin:   No bleeding, bruising or rash   Lymph nodes:   No palpable adenopathy   Neurologic:   Cranial nerves 2 - 12 grossly intact, sensation intact, DTR        present and equal bilaterally      Results Review:     Lab Results (last 24 hours)     Procedure Component Value Units Date/Time    CBC & Differential [[id number]] Collected:  10/31/17 0327    Specimen:  Blood Updated:  10/31/17 0342    Narrative:       The following orders were created for panel order CBC & Differential.  Procedure                               Abnormality         Status                     ---------                               -----------         ------                     CBC Auto Differential[[id number]]        Abnormal            Final result                 Please view results for these tests on the individual orders.    CBC Auto Differential [[id number]]  (Abnormal) Collected:  10/31/17 0327    Specimen:  Blood Updated:  10/31/17 0342     WBC 7.88 10*3/mm3      RBC 2.93 (L) 10*6/mm3      Hemoglobin 7.8 (L) g/dL      Hematocrit 23.1 (L) %      MCV 78.8 (L) fL      MCH 26.6 pg      MCHC 33.8 g/dL      RDW 15.4 (H) %      RDW-SD 44.6 (H) fl      MPV 9.3 fL      Platelets 97 (L) 10*3/mm3      Neutrophil % 87.9 (H) %      Lymphocyte % 8.6 (L) %      Monocyte % 2.9 %      Eosinophil % 0.0 %      Basophil % 0.1 %      Immature Grans % 0.5 %      Neutrophils, Absolute 6.92 10*3/mm3      Lymphocytes, Absolute 0.68 10*3/mm3      Monocytes, Absolute 0.23 10*3/mm3      Eosinophils, Absolute 0.00 10*3/mm3      Basophils, Absolute 0.01 10*3/mm3      Immature Grans, Absolute 0.04 (H) 10*3/mm3      nRBC 0.0 /100 WBC     Magnesium [[id number]]  (Normal) Collected:  10/31/17 0327    Specimen:  Blood Updated:  10/31/17 0347     Magnesium 1.8 mg/dL     Phosphorus [[id number]]  (Abnormal) Collected:  10/31/17 0327    Specimen:  Blood Updated:   10/31/17 0347     Phosphorus 7.1 (H) mg/dL     Comprehensive Metabolic Panel [[id number]]  (Abnormal) Collected:  10/31/17 0327    Specimen:  Blood Updated:  10/31/17 0347     Glucose 141 (H) mg/dL      BUN 59 (H) mg/dL      Creatinine 3.89 (H) mg/dL      Sodium 138 mmol/L      Potassium 3.5 mmol/L      Chloride 106 mmol/L      CO2 21.0 (L) mmol/L      Calcium 6.7 (L) mg/dL      Total Protein 5.5 (L) g/dL      Albumin 2.20 (L) g/dL      ALT (SGPT) 29 U/L      AST (SGOT) 32 U/L      Alkaline Phosphatase 41 U/L      Total Bilirubin 0.2 mg/dL      eGFR Non African Amer 16 (L) mL/min/1.73      Globulin 3.3 gm/dL      A/G Ratio 0.7 (L) g/dL      BUN/Creatinine Ratio 15.2     Anion Gap 11.0 mmol/L     Vancomycin, Random [[id number]] Collected:  10/31/17 0327    Specimen:  Blood Updated:  10/31/17 0400     Vancomycin Random 23.08 mcg/mL     Blood Gas, Arterial [[id number]]  (Abnormal) Collected:  10/31/17 0521    Specimen:  Arterial Blood Updated:  10/31/17 0527     Site --      Not performed at this site.        Paul's Test --      Not performed at this site.        pH, Arterial 7.385 pH units      pCO2, Arterial 40.0 mm Hg      pO2, Arterial 123.9 (H) mm Hg      HCO3, Arterial 23.4 mmol/L      Base Excess, Arterial -1.5 mmol/L      O2 Saturation, Arterial 98.0 %      Hemoglobin, Blood Gas 9.1 (L) g/dL      Hematocrit, Blood Gas 27.0 %      CO2 Content 24.6     Sodium, Arterial 136.7 (L) mmol/L      Potassium, Arterial 3.52 (L) mmol/L      Glucose, Arterial 144 mmol/L      Barometric Pressure for Blood Gas -- mmHg       Not performed at this site.        Modality --      Not performed at this site.        Ionized Calcium 4.20 (L) mg/dL     Respiratory Culture - Sputum, ET Suction [[id number]] Collected:  10/29/17 0913    Specimen:  Sputum from ET Suction Updated:  10/31/17 0613     Respiratory Culture Reduced Normal Respiratory Sherrie     Gram Stain Result Many (4+) WBCs per low power field      Rare (1+) Epithelial cells per  low power field      Mixed bacterial kim    Blood Culture - Blood, [[id number]]  (Abnormal) Collected:  10/28/17 1146    Specimen:  Blood from Blood, Arterial Line Updated:  10/31/17 0646     Blood Culture --      Enterococcus species (A)     Isolated from Aerobic Bottle     Gram Stain Result Aerobic Bottle Gram positive cocci in pairs      1 of 2 Positive         Anaerobic Bottle Gram positive cocci in pairs      2 of 2 Positive       Hepatitis B DNA, Quantitative, PCR [[id number]] Collected:  10/29/17 0456    Specimen:  Blood Updated:  10/31/17 1417     HBV IU/mL See Final Results IU/mL      Test Information Comment      The reportable range for this assay is 10 IU/mL to 1 billion IU/mL.       Narrative:       Performed at:  [address] [place] [address]  [id number]  : Edwin Lilly MD, Phone:  [phone]    HBV Real-Time PCR, Quant [[id number]] Collected:  10/29/17 0456    Specimen:  Blood Updated:  10/31/17 1417     HBV IU/mL [id number] IU/mL      log10 HBV IU/mL 8.823 xos30VI/mL     Narrative:       Performed at:  [address] [facility]  [id number]  : Edwin Lilly MD, Phone:  [phone]    Blood Culture - Blood, [[id number]]  (Normal) Collected:  10/30/17 1450    Specimen:  Blood from Arm, Left Updated:  10/31/17 1516     Blood Culture No growth at 24 hours    Blood Culture - Blood, [[id number]]  (Normal) Collected:  10/30/17 1807    Specimen:  Blood from Wrist, Right Updated:  10/31/17 1816     Blood Culture No growth at 24 hours           I reviewed the patient's new clinical results.  I reviewed the patient's new imaging results and agree with the interpretation.     ASSESSMENT/PLAN:   ASSESSMENT:   1.  Chronic hepatitis B  2.  Chronic hepatitis C  3.  Cirrhosis, most likely secondary to a combination of the above  4.  Anemia secondary to chronic blood loss as well as chronic anemia due to chronic disease  PLAN:   1.   High risk for liver cancer.  Alpha-fetoprotein is 5.4  2.  No need for endoscopy at the present time.  In the future will need to have an EGD to evaluate for esophageal varices      Toni Hobson DO  10/31/17  9:01 PM

## 2017-11-01 NOTE — SIGNIFICANT NOTE
11/01/17 1535   Rehab Treatment   Discipline speech language pathologist   Rehab Evaluation   Evaluation Not Performed patient unavailable for evaluation   pt awaiting CT.  Pt is not alert at this time.  nsg request attempt in morning.  Corinne Styles CCC-SLP 11/1/2017 3:36 PM

## 2017-11-01 NOTE — PLAN OF CARE
Problem: Patient Care Overview (Adult)  Goal: Plan of Care Review  Outcome: Ongoing (interventions implemented as appropriate)    11/01/17 0508   Outcome Evaluation   Outcome Summary/Follow up Plan pt extubated self on room aor o2 sats %knows name, however does not move the L side of body   Coping/Psychosocial Response Interventions   Plan Of Care Reviewed With patient   Patient Care Overview   Progress progress toward functional goals as expected         Problem: Mechanical Ventilation, Invasive (Adult)  Goal: Signs and Symptoms of Listed Potential Problems Will be Absent or Manageable (Mechanical Ventilation, Invasive)  Outcome: Outcome(s) achieved Date Met:  11/01/17    Problem: Pressure Ulcer Risk (Yogi Scale) (Adult,Obstetrics,Pediatric)  Goal: Skin Integrity  Outcome: Ongoing (interventions implemented as appropriate)    Problem: Fall Risk (Adult)  Goal: Identify Related Risk Factors and Signs and Symptoms  Outcome: Ongoing (interventions implemented as appropriate)    Problem: Pneumonia (Adult)  Goal: Signs and Symptoms of Listed Potential Problems Will be Absent or Manageable (Pneumonia)  Outcome: Ongoing (interventions implemented as appropriate)    Problem: SAFETY - NON-VIOLENT RESTRAINT  Goal: Remains free of injury from restraints (Non-Violent Restraint)  Outcome: Ongoing (interventions implemented as appropriate)  Goal: Free from restraint(s) (Non-Violent Restraint)  Outcome: Ongoing (interventions implemented as appropriate)

## 2017-11-01 NOTE — PROGRESS NOTES
[facility] Medicine Services  INPATIENT PROGRESS NOTE    Length of Stay: 5  Date of Admission: 10/26/2017  Primary Care Physician: GUSTAVO Aguilar    Subjective   Chief Complaint:   Chief Complaint   Patient presents with   • Altered Mental Status       HPI  Patient is now extubated . Has left sided weakness  . Blood cultures are negative so far  Will need 6 weeks of antibiotics   I informed the daughter about his condition   Review of Systems   Unable to perform ROS: Patient nonverbal        All pertinent negatives and positives are as above. All other systems have been reviewed and are negative unless otherwise stated.     Objective    Temp:  [97.5 °F (36.4 °C)-97.8 °F (36.6 °C)] 97.5 °F (36.4 °C)  Heart Rate:  [40-74] 68  Resp:  [14] 14  BP: (107-177)/(68-88) 136/81  FiO2 (%):  [30 %] 30 %  Physical Exam   Constitutional: He appears well-developed and well-nourished.   HENT:   Head: Normocephalic and atraumatic.   Eyes: EOM are normal. Pupils are equal, round, and reactive to light.   Neck: Normal range of motion. Neck supple.   Cardiovascular: Normal rate and regular rhythm.  Exam reveals no gallop and no friction rub.    No murmur heard.  Pulmonary/Chest: Effort normal and breath sounds normal. No respiratory distress. He has no wheezes. He has no rales.   Abdominal: Soft. Bowel sounds are normal. He exhibits no distension. There is no tenderness.   Musculoskeletal: He exhibits no edema.   Neurological: A cranial nerve deficit is present.   Expressive aphasia   Left sided weakness    Skin: Skin is warm and dry.           Results Review:  I have reviewed the labs, radiology results, and diagnostic studies.    Laboratory Data:   Lab Results (last 24 hours)     Procedure Component Value Units Date/Time    Hepatitis B DNA, Quantitative, PCR [[id number]] Collected:  10/29/17 0456    Specimen:  Blood Updated:  10/31/17 1417     HBV IU/mL See Final Results IU/mL       Test Information Comment      The reportable range for this assay is 10 IU/mL to 1 billion IU/mL.       Narrative:       Performed at:  01 - [address]  [id number]  : Edwin Lilly MD, Phone:  [phone]    HBV Real-Time PCR, Quant [[id number]] Collected:  10/29/17 0456    Specimen:  Blood Updated:  10/31/17 1417     HBV IU/mL [id number] IU/mL      log10 HBV IU/mL 8.823 bhu27GL/mL     Narrative:       [facility]formed at:  [facility]  [address], [place]  [id number]  : Edwin Lilly MD, Phone:  [phone]    Blood Culture - Blood, [[id number]]  (Normal) Collected:  10/30/17 1450    Specimen:  Blood from Arm, Left Updated:  10/31/17 1516     Blood Culture No growth at 24 hours    Blood Culture - Blood, [[id number]]  (Normal) Collected:  10/30/17 1807    Specimen:  Blood from Wrist, Right Updated:  10/31/17 1816     Blood Culture No growth at 24 hours    Blood Gas, Arterial [[id number]]  (Abnormal) Collected:  10/31/17 2133    Specimen:  Arterial Blood Updated:  10/31/17 2140     Site --      Not performed at this site.        Paul's Test --      Not performed at this site.        pH, Arterial 7.437 pH units      pCO2, Arterial 37.7 mm Hg      pO2, Arterial 68.3 (L) mm Hg      HCO3, Arterial 24.9 mmol/L      Base Excess, Arterial 0.8 mmol/L      O2 Saturation, Arterial 92.4 %      Hemoglobin, Blood Gas 10.1 (L) g/dL      Hematocrit, Blood Gas 30.0 (L) %      CO2 Content 26.0     Sodium, Arterial 135.1 (L) mmol/L      Potassium, Arterial 3.27 (L) mmol/L      Glucose, Arterial 165 mmol/L      Barometric Pressure for Blood Gas -- mmHg       Not performed at this site.        Modality --      Not performed at this site.        Ionized Calcium 4.30 (L) mg/dL     Hepatitis C RNA, Quantitative, PCR (graph) [[id number]] Collected:  10/29/17 0456    Specimen:  Blood Updated:  10/31/17 2307     Hepatitis C Quantitation HCV Not Detected IU/mL       Test Information Comment      The quantitative range of this assay is 15 IU/mL to 100 million IU/mL.       Narrative:       Performed at:  01  [address]  [id number]  : Edwin Lilly MD, Phone:  [phone]    CBC & Differential [[id number]] Collected:  11/01/17 0615    Specimen:  Blood Updated:  11/01/17 0624    Narrative:       The following orders were created for panel order CBC & Differential.  Procedure                               Abnormality         Status                     ---------                               -----------         ------                     CBC Auto Differential[[id number]]        Abnormal            Final result                 Please view results for these tests on the individual orders.    CBC Auto Differential [[id number]]  (Abnormal) Collected:  11/01/17 0615    Specimen:  Blood Updated:  11/01/17 0624     WBC 9.59 10*3/mm3      RBC 2.99 (L) 10*6/mm3      Hemoglobin 8.0 (L) g/dL      Hematocrit 23.1 (L) %      MCV 77.3 (L) fL      MCH 26.8 pg      MCHC 34.6 g/dL      RDW 14.7 (H) %      RDW-SD 42.5 fl      MPV 9.6 fL      Platelets 100 (L) 10*3/mm3      Neutrophil % 87.7 (H) %      Lymphocyte % 6.0 (L) %      Monocyte % 5.9 %      Eosinophil % 0.0 %      Basophil % 0.0 %      Immature Grans % 0.4 %      Neutrophils, Absolute 8.40 10*3/mm3      Lymphocytes, Absolute 0.58 (L) 10*3/mm3      Monocytes, Absolute 0.57 10*3/mm3      Eosinophils, Absolute 0.00 10*3/mm3      Basophils, Absolute 0.00 10*3/mm3      Immature Grans, Absolute 0.04 (H) 10*3/mm3     Comprehensive Metabolic Panel [[id number]]  (Abnormal) Collected:  11/01/17 0615    Specimen:  Blood Updated:  11/01/17 0645     Glucose 144 (H) mg/dL      BUN 39 (H) mg/dL      Creatinine 2.65 (H) mg/dL      Sodium 139 mmol/L      Potassium 3.5 mmol/L      Chloride 106 mmol/L      CO2 24.0 mmol/L      Calcium 7.6 (L) mg/dL      Total Protein 6.0 (L) g/dL      Albumin 2.40 (L) g/dL       ALT (SGPT) 25 U/L      AST (SGOT) 35 U/L      Alkaline Phosphatase 50 U/L      Total Bilirubin 0.5 mg/dL      eGFR Non African Amer 25 (L) mL/min/1.73      Globulin 3.6 (H) gm/dL      A/G Ratio 0.7 (L) g/dL      BUN/Creatinine Ratio 14.7     Anion Gap 9.0 mmol/L     Magnesium [[id number]]  (Normal) Collected:  11/01/17 0615    Specimen:  Blood Updated:  11/01/17 0645     Magnesium 1.8 mg/dL     Phosphorus [[id number]]  (Abnormal) Collected:  11/01/17 0615    Specimen:  Blood Updated:  11/01/17 0647     Phosphorus 5.5 (H) mg/dL     Blood Culture - Blood, [[id number]]  (Abnormal) Collected:  10/27/17 0109    Specimen:  Blood from Arm, Left Updated:  11/01/17 1010     Blood Culture --      Enterococcus faecalis (A)      for sensitivity see previous report          Isolated from Aerobic and Anaerobic Bottles     Gram Stain Result Aerobic Bottle Gram positive cocci in pairs      2 of 4         Anaerobic Bottle Gram positive cocci in pairs      4 of 4       Blood Culture - Blood, [[id number]]  (Abnormal) Collected:  10/28/17 1146    Specimen:  Blood from Blood, Arterial Line Updated:  11/01/17 1011     Blood Culture --      Enterococcus faecalis (A)      for sensitivity see previous report          Isolated from Aerobic Bottle     Gram Stain Result Aerobic Bottle Gram positive cocci in pairs      1 of 2 Positive         Anaerobic Bottle Gram positive cocci in pairs      2 of 2 Positive       Blood Culture - Blood, [[id number]]  (Abnormal)  (Susceptibility) Collected:  10/27/17 0050    Specimen:  Blood from Arm, Left Updated:  11/01/17 1011     Blood Culture --      Enterococcus faecalis (A)     Isolated from Aerobic and Anaerobic Bottles     Gram Stain Result Aerobic Bottle Gram positive cocci in pairs      1 of 4         Anaerobic Bottle Gram positive cocci in pairs      3 of 4       Susceptibility      Enterococcus faecalis     GERRY     Ampicillin <=2 ug/ml Susceptible     Gentamicin High Level Synergy >500 ug/ml Resistant      Linezolid 2 ug/ml Susceptible     Penicillin G 2 ug/ml Susceptible     Vancomycin 2 ug/ml Susceptible                          Culture Data:   Blood Culture   Date Value Ref Range Status   10/30/2017 No growth at 24 hours  Preliminary   10/30/2017 No growth at 24 hours  Preliminary     No results found for: URINECX  No results found for: RESPCX  No results found for: WOUNDCX  No results found for: STOOLCX  No components found for: BODYFLD    Radiology Data:   Imaging Results (last 24 hours)     Procedure Component Value Units Date/Time    XR Chest 1 View [[id number]] Collected:  11/01/17 0531     Updated:  11/01/17 1030    Narrative:       [facility]    Patient Name: ORAL FRYE    ORDERING: DORA SAUNDERS     ATTENDING: BRISSA MERINO     REFERRING: DORA SAUNDERS    -----------------------    PROCEDURE: Portable chest x-ray    TECHNIQUE: Single AP view of the chest    COMPARISON: 10/31/2017    HISTORY: intubated, J18.9 Pneumonia, unspecified organism J96.90  Respiratory failure, unspecified, unspecified whether with  hypoxia or hypercapnia D72.829 Elevated white blood cell count,  unspecified E87.6 Hypokalemia N17.9 Acute kidney failure,  unspecified E86.0 Dehydration R74.8 Abnormal levels of other  serum enzymes R41.82 Altered mental status, unspecified B18.2  Chronic viral hepatitis C N19 unspecified    FINDINGS:     Life-support devices: Endotracheal and enteric tubes have been  removed.    Lungs/pleura: Coarse interstitial markings are seen diffusely,  likely due to chronic lung disease. Biapical opacities are  present without significant change, probably due to scarring.    Heart, hilar and mediastinal structures: Stable accounting for  differences in projection and technique.      Impression:       CONCLUSION:  No acute pulmonary disease.    Electronically signed by:  Edwin Souza MD  11/1/2017 10:23 AM  CDT Workstation: [id number]          I have reviewed the patient current  medications.     Assessment/Plan     Principal Problem:    Pneumonia of both lungs due to infectious organism  Active Problems:    Acute on chronic respiratory failure with hypoxia and hypercapnia    Acute renal failure (ARF)    Hypokalemia( resolved)    GI bleeding    Decompensated COPD with exacerbation (chronic obstructive pulmonary disease)    Chronic pain    Cachexia    Anemia  Infective endocarditis   Enterococcus bacteremia    UTI   Hypotension (resolved )    ?? Left sided weakness       Plan:  Patient self extubated  Is on o2 will get a chest xray in am   Continue with pressors . Is now off  Pressors BP is controlled    continue with iv antibiotics for 6 weeks  , repeat blood cultures are negative so far   Ct of the brain done no new findings   Nephrology on board   Kidney function better after dialysis      Gi evaluated the patient  At one point the patient will have  An EGD ,hiv will be ordered   HB is stable no transfusion for today 11/01HB is 8 today 11/01  Will get swallow evaluation   Start PT /OT     Katie Chambers MD   11/01/17   1:30 PM

## 2017-11-01 NOTE — PROGRESS NOTES
Cardiology Progress Note:     LOS: 5 days   Patient Care Team:  GUSTAVO Aguilar as PCP - General (Family Medicine)      Subjective:    Chart reviewed , patient seen and examined.  Patient extubated himself last night.  Patient has maintained an oxygen saturation.  Patient has had episodes of bradycardia.  Patient is able to respond but has expressive aphasia.  Patient is unable to move his left upper and lower extremity.  Patient has not had any recurrence of febrile episode.  Patient blood pressure has remained elevated.          Objective:     Objective:  Vitals:    11/01/17 0604   BP: 177/77   Pulse: (!) 40   Resp:    Temp:    SpO2: 100%       Intake/Output Summary (Last 24 hours) at 11/01/17 0650  Last data filed at 11/01/17 0400   Gross per 24 hour   Intake             1104 ml   Output             1375 ml   Net             -271 ml             Physical Exam:   General Appearance:  Patient is arousable.   Head:    Normocephalic, atraumatic, without obvious abnormality   Eyes:           MARISOL  Lids and lashes normal, conjunctivae and sclerae normal, no icterus, no pallor   Ears:    Ears appear intact with no abnormalities noted   Throat:   Mucous membranes pink and moist   Neck:   Supple, trachea midline, no carotid bruit, no organomegaly or JVD   Lungs:     Clear to auscultation and percussion, respirations regular, even and Unlabored. No wheezes, rales, rhonchi    Heart:    Regular rhythm and normal rate, normal S1 and S2, no            murmur, no gallop, no rub, no click   Abdomen:     Soft, non-tender, non-distended, no guarding, no rebound tenderness, Normal bowel sounds in all four quadrant, no masses, liver and spleen nonpalpable,    Genitalia:    Deferred   Extremities:   Moves all extremities well, no edema, no cyanosis, no              Redness, no clubbing   Pulses:   Pulses palpable and equal bilaterally   Skin:   Moist and warm. No bleeding, bruising or rash   Neurologic/Psychiatric: Patient  is responding to verbal commands but unable to move his left upper and lower extremity with expressive aphasia.  There is no involuntary movement noted.              Results Review:    Lab Results (last 24 hours)     Procedure Component Value Units Date/Time    Hepatitis B DNA, Quantitative, PCR [[id number]] Collected:  10/29/17 0456    Specimen:  Blood Updated:  10/31/17 1417     HBV IU/mL See Final Results IU/mL      Test Information Comment      The reportable range for this assay is 10 IU/mL to 1 billion IU/mL.       Narrative:       Performed at:  [address] [place] [address]  [id number]  : Edwin Lilly MD, Phone:  [phone]    HBV Real-Time PCR, Quant [[id number]] Collected:  10/29/17 0456    Specimen:  Blood Updated:  10/31/17 1417     HBV IU/mL [id number] IU/mL      log10 HBV IU/mL 8.823 alt07QH/mL     Narrative:       Performed at:   - [address]  [id number]  : Edwin Lilly MD, Phone:  [phone]    Blood Culture - Blood, [[id number]]  (Normal) Collected:  10/30/17 1450    Specimen:  Blood from Arm, Left Updated:  10/31/17 1516     Blood Culture No growth at 24 hours    Blood Culture - Blood, [[id number]]  (Normal) Collected:  10/30/17 1807    Specimen:  Blood from Wrist, Right Updated:  10/31/17 1816     Blood Culture No growth at 24 hours    Blood Gas, Arterial [[id number]]  (Abnormal) Collected:  10/31/17 2133    Specimen:  Arterial Blood Updated:  10/31/17 2140     Site --      Not performed at this site.        Paul's Test --      Not performed at this site.        pH, Arterial 7.437 pH units      pCO2, Arterial 37.7 mm Hg      pO2, Arterial 68.3 (L) mm Hg      HCO3, Arterial 24.9 mmol/L      Base Excess, Arterial 0.8 mmol/L      O2 Saturation, Arterial 92.4 %      Hemoglobin, Blood Gas 10.1 (L) g/dL      Hematocrit, Blood Gas 30.0 (L) %      CO2 Content 26.0     Sodium, Arterial 135.1 (L) mmol/L       Potassium, Arterial 3.27 (L) mmol/L      Glucose, Arterial 165 mmol/L      Barometric Pressure for Blood Gas -- mmHg       Not performed at this site.        Modality --      Not performed at this site.        Ionized Calcium 4.30 (L) mg/dL     Hepatitis C RNA, Quantitative, PCR (graph) [[id number]] Collected:  10/29/17 0456    Specimen:  Blood Updated:  10/31/17 2307     Hepatitis C Quantitation HCV Not Detected IU/mL      Test Information Comment      The quantitative range of this assay is 15 IU/mL to 100 million IU/mL.       Narrative:       Performed at:  [address] [place] [address]  [id number]  : Edwin Lilly MD, Phone:  [phone]    CBC & Differential [[id number]] Collected:  11/01/17 0615    Specimen:  Blood Updated:  11/01/17 0624    Narrative:       The following orders were created for panel order CBC & Differential.  Procedure                               Abnormality         Status                     ---------                               -----------         ------                     CBC Auto Differential[[id number]]        Abnormal            Final result                 Please view results for these tests on the individual orders.    CBC Auto Differential [[id number]]  (Abnormal) Collected:  11/01/17 0615    Specimen:  Blood Updated:  11/01/17 0624     WBC 9.59 10*3/mm3      RBC 2.99 (L) 10*6/mm3      Hemoglobin 8.0 (L) g/dL      Hematocrit 23.1 (L) %      MCV 77.3 (L) fL      MCH 26.8 pg      MCHC 34.6 g/dL      RDW 14.7 (H) %      RDW-SD 42.5 fl      MPV 9.6 fL      Platelets 100 (L) 10*3/mm3      Neutrophil % 87.7 (H) %      Lymphocyte % 6.0 (L) %      Monocyte % 5.9 %      Eosinophil % 0.0 %      Basophil % 0.0 %      Immature Grans % 0.4 %      Neutrophils, Absolute 8.40 10*3/mm3      Lymphocytes, Absolute 0.58 (L) 10*3/mm3      Monocytes, Absolute 0.57 10*3/mm3      Eosinophils, Absolute 0.00 10*3/mm3      Basophils, Absolute 0.00 10*3/mm3       Immature Grans, Absolute 0.04 (H) 10*3/mm3     Comprehensive Metabolic Panel [[id number]]  (Abnormal) Collected:  11/01/17 0615    Specimen:  Blood Updated:  11/01/17 0645     Glucose 144 (H) mg/dL      BUN 39 (H) mg/dL      Creatinine 2.65 (H) mg/dL      Sodium 139 mmol/L      Potassium 3.5 mmol/L      Chloride 106 mmol/L      CO2 24.0 mmol/L      Calcium 7.6 (L) mg/dL      Total Protein 6.0 (L) g/dL      Albumin 2.40 (L) g/dL      ALT (SGPT) 25 U/L      AST (SGOT) 35 U/L      Alkaline Phosphatase 50 U/L      Total Bilirubin 0.5 mg/dL      eGFR Non African Amer 25 (L) mL/min/1.73      Globulin 3.6 (H) gm/dL      A/G Ratio 0.7 (L) g/dL      BUN/Creatinine Ratio 14.7     Anion Gap 9.0 mmol/L     Magnesium [[id number]]  (Normal) Collected:  11/01/17 0615    Specimen:  Blood Updated:  11/01/17 0645     Magnesium 1.8 mg/dL     Phosphorus [[id number]]  (Abnormal) Collected:  11/01/17 0615    Specimen:  Blood Updated:  11/01/17 0647     Phosphorus 5.5 (H) mg/dL            Medication Review:   Current Facility-Administered Medications   Medication Dose Route Frequency Provider Last Rate Last Dose   • cefTRIAXone (ROCEPHIN) 2 g/100 mL 0.9% NS VTB (ISAEL)  2 g Intravenous Q24H Katie Chambers MD   2 g at 10/31/17 2106   • chlorhexidine (PERIDEX) 0.12 % solution 15 mL  15 mL Mouth/Throat Q12H Jaya Harrison MD   15 mL at 10/31/17 2018   • famotidine (PEPCID) injection 10 mg  10 mg Intravenous BID Jaya Harrison MD   10 mg at 10/31/17 1814   • influenza vac split quad (FLUZONE QUADRIVALENT) IM suspension 0.5 mL  0.5 mL Intramuscular During Hospitalization Jaya Harrison MD       • methylPREDNISolone sodium succinate (SOLU-Medrol) injection 80 mg  80 mg Intravenous Q6H Jaya Harrison MD   80 mg at 10/31/17 2321   • midazolam (VERSED) 50 mg in sodium chloride 0.9 % 100 mL (0.5 mg/mL) infusion  1 mg/hr Intravenous Titrated Jaya Harrison MD   Stopped at 10/31/17 6424   • morphine injection 4 mg  4 mg Intravenous Q1H PRN Jaya  MD Hunter   4 mg at 10/30/17 1330   • norepinephrine (LEVOPHED) 8,000 mcg in sodium chloride 0.9 % 250 mL (32 mcg/mL) infusion  0.02-0.3 mcg/kg/min Intravenous Titrated Jaya Harrison MD   Stopped at 10/27/17 0814   • Pharmacy to dose vancomycin   Does not apply Continuous PRN Katie Chambers MD       • pneumococcal polysaccharide 23-valent (PNEUMOVAX-23) vaccine 0.5 mL  0.5 mL Intramuscular During Hospitalization Jaya Harrison MD       • sodium chloride 0.9 % bolus 100 mL  100 mL Intravenous PRN James Mcmanus MD       • sodium chloride 0.9 % bolus 100 mL  100 mL Intravenous PRN James Mcmanus MD       • sodium chloride 0.9 % bolus 500 mL  500 mL Intravenous Once James Mcmanus MD   Stopped at 10/30/17 1100   • sodium chloride 0.9 % flush 1-10 mL  1-10 mL Intravenous PRN Jaya Harrison MD       • sodium chloride 0.9 % infusion  50 mL/hr Intravenous Continuous James Mcmanus MD 50 mL/hr at 11/01/17 0306 50 mL/hr at 11/01/17 0306       Assessment and Plan:    Principal Problem:    Pneumonia of both lungs due to infectious organism  Active Problems:    Decompensated COPD with exacerbation (chronic obstructive pulmonary disease)    Chronic pain    Acute on chronic respiratory failure with hypoxia and hypercapnia    Acute renal failure (ARF)    Hypokalemia    Cachexia    Anemia    GI bleeding  1.  Respiratory failure.  Patient has self extubated himself currently is breathing and maintaining an oxygen saturation of 100%.  Patient did not appear to be tachypneic.  2.  Bradycardia arrhythmia.  Patient has had episodes of bradycardia which has been transient.  Patient current heart rate is 59 bpm.  At the present time patient is not on any AV blocking medication.  Patient is mildly hypertensive.  Would consider using hydralazine if the patient blood pressure remained elevated.  Patient at the present time would not undergo any intervention for the bradycardia arrhythmia.  3.  Mitral valve endocarditis with enterococcus  bacteremia.  Patient is currently on vancomycin.  Patient has not had any recurrence of febrile episode.  4.  Acute renal failure.  Patient has been followed by nephrology and is getting hemodialysis.  Patient renal function has improved with a creatinine of 2.6 and a BUN of 39  5.  Anemia.  Patient's hemoglobin has remained stable.  6.  Cerebrovascular accident with right basal ganglia ischemia.  Patient is to undergo a repeat CAT scan.  Patient does have left-sided hemiparesis with expressive aphasia.      Above plan of management were discussed with the nursing staff    Mike Alvarado MD  11/01/17  6:50 AM      Time: Time spent in face-to-face interaction 25 minutes    EMR Dragon/Transcription disclaimer:   Much of this encounter note is an electronic transcription/translation of spoken language to printed text. The electronic translation of spoken language may permit erroneous, or at times, nonsensical words or phrases to be inadvertently transcribed; Although I have reviewed the note for such errors, some may still exist.

## 2017-11-01 NOTE — PROGRESS NOTES
Cardiology Progress Note:     LOS: 4 days   Patient Care Team:  GUSTAVO Aguilar as PCP - General (Family Medicine)      Subjective:     Chart reviewed , patient seen and examined.  Patient has had labile heart rate with a drop in the heart rate.  Patient has not had any further febrile episode.  Patient remained intubated with stable blood pressure.        Objective:     Objective:  Vitals:    10/31/17 2005   BP: 138/80   Pulse: 67   Resp:    Temp:    SpO2: 100%       Intake/Output Summary (Last 24 hours) at 10/31/17 2014  Last data filed at 10/31/17 1745   Gross per 24 hour   Intake           1914.1 ml   Output             1253 ml   Net            661.1 ml             Physical Exam:   General Appearance:    Currently intubated    Head:    Normocephalic, atraumatic, without obvious abnormality   Eyes:           MARISOL  Lids and lashes normal, conjunctivae and sclerae normal, no icterus, no pallor   Ears:    Ears appear intact with no abnormalities noted   Throat:   Mucous membranes pink and moist   Neck:   Supple, trachea midline, no carotid bruit, no organomegaly or JVD   Lungs:     Clear to auscultation and percussion, respirations regular, even and Unlabored. No wheezes, rales, rhonchi    Heart:    Regular rhythm and normal rate, normal S1 and S2, no            murmur, no gallop, no rub, no click   Abdomen:     Soft, non-tender, non-distended, no guarding, no rebound tenderness, Normal bowel sounds in all four quadrant, no masses, liver and spleen nonpalpable,    Genitalia:    Deferred   Extremities:   Moves all extremities well, no edema, no cyanosis, no              Redness, no clubbing   Pulses:   Pulses palpable and equal bilaterally   Skin:   Moist and warm. No bleeding, bruising or rash   Neurologic/Psychiatric: Currently intubated            Results Review:    Lab Results (last 24 hours)     Procedure Component Value Units Date/Time    CBC & Differential [[id number]] Collected:  10/31/17 2513     Specimen:  Blood Updated:  10/31/17 0342    Narrative:       The following orders were created for panel order CBC & Differential.  Procedure                               Abnormality         Status                     ---------                               -----------         ------                     CBC Auto Differential[[id number]]        Abnormal            Final result                 Please view results for these tests on the individual orders.    CBC Auto Differential [[id number]]  (Abnormal) Collected:  10/31/17 0327    Specimen:  Blood Updated:  10/31/17 0342     WBC 7.88 10*3/mm3      RBC 2.93 (L) 10*6/mm3      Hemoglobin 7.8 (L) g/dL      Hematocrit 23.1 (L) %      MCV 78.8 (L) fL      MCH 26.6 pg      MCHC 33.8 g/dL      RDW 15.4 (H) %      RDW-SD 44.6 (H) fl      MPV 9.3 fL      Platelets 97 (L) 10*3/mm3      Neutrophil % 87.9 (H) %      Lymphocyte % 8.6 (L) %      Monocyte % 2.9 %      Eosinophil % 0.0 %      Basophil % 0.1 %      Immature Grans % 0.5 %      Neutrophils, Absolute 6.92 10*3/mm3      Lymphocytes, Absolute 0.68 10*3/mm3      Monocytes, Absolute 0.23 10*3/mm3      Eosinophils, Absolute 0.00 10*3/mm3      Basophils, Absolute 0.01 10*3/mm3      Immature Grans, Absolute 0.04 (H) 10*3/mm3      nRBC 0.0 /100 WBC     Magnesium [[id number]]  (Normal) Collected:  10/31/17 0327    Specimen:  Blood Updated:  10/31/17 0347     Magnesium 1.8 mg/dL     Phosphorus [[id number]]  (Abnormal) Collected:  10/31/17 0327    Specimen:  Blood Updated:  10/31/17 0347     Phosphorus 7.1 (H) mg/dL     Comprehensive Metabolic Panel [[id number]]  (Abnormal) Collected:  10/31/17 0327    Specimen:  Blood Updated:  10/31/17 0347     Glucose 141 (H) mg/dL      BUN 59 (H) mg/dL      Creatinine 3.89 (H) mg/dL      Sodium 138 mmol/L      Potassium 3.5 mmol/L      Chloride 106 mmol/L      CO2 21.0 (L) mmol/L      Calcium 6.7 (L) mg/dL      Total Protein 5.5 (L) g/dL      Albumin 2.20 (L) g/dL      ALT (SGPT) 29 U/L      AST  (SGOT) 32 U/L      Alkaline Phosphatase 41 U/L      Total Bilirubin 0.2 mg/dL      eGFR Non African Amer 16 (L) mL/min/1.73      Globulin 3.3 gm/dL      A/G Ratio 0.7 (L) g/dL      BUN/Creatinine Ratio 15.2     Anion Gap 11.0 mmol/L     Vancomycin, Random [[id number]] Collected:  10/31/17 0327    Specimen:  Blood Updated:  10/31/17 0400     Vancomycin Random 23.08 mcg/mL     Blood Gas, Arterial [[id number]]  (Abnormal) Collected:  10/31/17 0521    Specimen:  Arterial Blood Updated:  10/31/17 0527     Site --      Not performed at this site.        Paul's Test --      Not performed at this site.        pH, Arterial 7.385 pH units      pCO2, Arterial 40.0 mm Hg      pO2, Arterial 123.9 (H) mm Hg      HCO3, Arterial 23.4 mmol/L      Base Excess, Arterial -1.5 mmol/L      O2 Saturation, Arterial 98.0 %      Hemoglobin, Blood Gas 9.1 (L) g/dL      Hematocrit, Blood Gas 27.0 %      CO2 Content 24.6     Sodium, Arterial 136.7 (L) mmol/L      Potassium, Arterial 3.52 (L) mmol/L      Glucose, Arterial 144 mmol/L      Barometric Pressure for Blood Gas -- mmHg       Not performed at this site.        Modality --      Not performed at this site.        Ionized Calcium 4.20 (L) mg/dL     Respiratory Culture - Sputum, ET Suction [[id number]] Collected:  10/29/17 0913    Specimen:  Sputum from ET Suction Updated:  10/31/17 0613     Respiratory Culture Reduced Normal Respiratory Kim     Gram Stain Result Many (4+) WBCs per low power field      Rare (1+) Epithelial cells per low power field      Mixed bacterial kim    Blood Culture - Blood, [[id number]]  (Abnormal) Collected:  10/28/17 1146    Specimen:  Blood from Blood, Arterial Line Updated:  10/31/17 0646     Blood Culture --      Enterococcus species (A)     Isolated from Aerobic Bottle     Gram Stain Result Aerobic Bottle Gram positive cocci in pairs      1 of 2 Positive         Anaerobic Bottle Gram positive cocci in pairs      2 of 2 Positive       Hepatitis B DNA,  Quantitative, PCR [[id number]] Collected:  10/29/17 0456    Specimen:  Blood Updated:  10/31/17 1417     HBV IU/mL See Final Results IU/mL      Test Information Comment      The reportable range for this assay is 10 IU/mL to 1 billion IU/mL.       Narrative:       Performed at:  [address] [place] [id number]  [id number]  : Edwin Lilly MD, Phone:  [phone]    HBV Real-Time PCR, Quant [[id number]] Collected:  10/29/17 0456    Specimen:  Blood Updated:  10/31/17 1417     HBV IU/mL [id number] IU/mL      log10 HBV IU/mL 8.823 ymx49TG/mL     Narrative:       Performed at:  [address] [place] [address]  [id number]  : Edwin Lilly MD, Phone:  [phone]    Blood Culture - Blood, [[id number]]  (Normal) Collected:  10/30/17 1450    Specimen:  Blood from Arm, Left Updated:  10/31/17 1516     Blood Culture No growth at 24 hours    Blood Culture - Blood, [[id number]]  (Normal) Collected:  10/30/17 1807    Specimen:  Blood from Wrist, Right Updated:  10/31/17 1816     Blood Culture No growth at 24 hours           Medication Review:   Current Facility-Administered Medications   Medication Dose Route Frequency Provider Last Rate Last Dose   • cefTRIAXone (ROCEPHIN) 2 g/100 mL 0.9% NS VTB (ISAEL)  2 g Intravenous Q24H Katie Chambers MD   2 g at 10/30/17 2151   • chlorhexidine (PERIDEX) 0.12 % solution 15 mL  15 mL Mouth/Throat Q12H Jaya Harrison MD   15 mL at 10/31/17 0815   • famotidine (PEPCID) injection 10 mg  10 mg Intravenous BID Jaya Harrison MD   10 mg at 10/31/17 1814   • influenza vac split quad (FLUZONE QUADRIVALENT) IM suspension 0.5 mL  0.5 mL Intramuscular During Hospitalization Jaya Harrison MD       • methylPREDNISolone sodium succinate (SOLU-Medrol) injection 80 mg  80 mg Intravenous Q6H Jaya Harrison MD   80 mg at 10/31/17 1813   • midazolam (VERSED) 50 mg in sodium chloride 0.9 % 100 mL (0.5 mg/mL) infusion  1 mg/hr Intravenous  Titrated Jaya Harrison MD   Stopped at 10/31/17 0545   • morphine injection 4 mg  4 mg Intravenous Q1H PRN Jaya Harrison MD   4 mg at 10/30/17 1330   • norepinephrine (LEVOPHED) 8,000 mcg in sodium chloride 0.9 % 250 mL (32 mcg/mL) infusion  0.02-0.3 mcg/kg/min Intravenous Titrated Jaya Harrison MD   Stopped at 10/27/17 0814   • Pharmacy to dose vancomycin   Does not apply Continuous PRN Katie Chambers MD       • pneumococcal polysaccharide 23-valent (PNEUMOVAX-23) vaccine 0.5 mL  0.5 mL Intramuscular During Hospitalization Jaya Harrison MD       • sodium chloride 0.9 % bolus 100 mL  100 mL Intravenous PRN James Mcmanus MD       • sodium chloride 0.9 % bolus 100 mL  100 mL Intravenous PRN James Mcmanus MD       • sodium chloride 0.9 % bolus 500 mL  500 mL Intravenous Once James Mcmanus MD   Stopped at 10/30/17 1100   • sodium chloride 0.9 % flush 1-10 mL  1-10 mL Intravenous PRN Jaya Harrison MD       • sodium chloride 0.9 % infusion  50 mL/hr Intravenous Continuous James Mcmanus MD 50 mL/hr at 10/31/17 0818 50 mL/hr at 10/31/17 0818       Assessment and Plan:    Principal Problem:    Pneumonia of both lungs due to infectious organism  Active Problems:    Decompensated COPD with exacerbation (chronic obstructive pulmonary disease)    Chronic pain    Acute on chronic respiratory failure with hypoxia and hypercapnia    Acute renal failure (ARF)    Hypokalemia    Cachexia    Anemia    GI bleeding  1.  Regurgitation on the mitral valve with positive blood cultures.  Patient is currently on antibiotics.  Patient has not had any recurrence of febrile episode.  2.  Bradycardia.  Patient is not a candidate for temporary pacemaker wire.  Patient is currently not on any AV blocking medication.  At the present time patient telemetry monitor will be followed.  Once the patient is extubated were discussed with the family if the patient remained bradycardic.  3.  Enterococcus endocarditis with positive blood cultures  patient is currently on vancomycin.  4.  Acute renal insufficiency.  Patient underwent hemodialysis and has been followed by nephrology.  5.  Respiratory failure.  Patient is currently getting weaned off the ventilator.  Patient FiO2 30%.  6.  Cerebrovascular accident.  Patient is not moving the left upper and lower extremity and is to undergo a repeat CAT scan for possible septic emboli.  7.  Anemia with a hemoglobin of 7.8.  Patient was evaluated by gastroenterology.  Patient did not have any local bleeding and patient H&H will be followed.            Mike Alvarado MD  10/31/17  8:14 PM      Time: Time spent on face-to-face interaction 25 minutes    EMR Dragon/Transcription disclaimer:   Much of this encounter note is an electronic transcription/translation of spoken language to printed text. The electronic translation of spoken language may permit erroneous, or at times, nonsensical words or phrases to be inadvertently transcribed; Although I have reviewed the note for such errors, some may still exist.

## 2017-11-01 NOTE — PROGRESS NOTES
"[facility]  [address] [place] [address]. [address]  T - [phone]  F - [phone]     Progress Note          PATIENT  DEMOGRAPHICS   PATIENT NAME: Héctor Koo                      PHYSICIAN: James Mcmanus MD  : 1955  MRN: [id number]   LOS: 5 days    Patient Care Team:  GUSTAVO Aguilar as PCP - General (Family Medicine)  Subjective   SUBJECTIVE   Self extubated no soa.        Objective   OBJECTIVE   Vital Signs  Temp:  [97.5 °F (36.4 °C)-98.3 °F (36.8 °C)] 97.5 °F (36.4 °C)  Heart Rate:  [40-74] 62  Resp:  [14] 14  BP: (107-177)/(68-88) 137/79  FiO2 (%):  [30 %] 30 %    Flowsheet Rows         First Filed Value    Admission Height  67\" (170.2 cm) Documented at 10/27/2017 004    Admission Weight  92 lb (41.7 kg) Documented at 10/27/2017 0040           I/O last 3 completed shifts:  In: 2014.1 [I.V.:1202.1; Other:256; NG/GT:356; IV Piggyback:200]  Out: 1658 [Urine:658; Other:1000]    PHYSICAL EXAM    Physical Exam   Constitutional: He appears well-developed.   Poorly nourished   HENT:   Head: Normocephalic and atraumatic.   ET tube present   Eyes: Right eye exhibits no discharge. Left eye exhibits no discharge. No scleral icterus.   Cardiovascular: Normal rate, regular rhythm and normal heart sounds.  Exam reveals no gallop and no friction rub.    No murmur heard.  Pulmonary/Chest: Effort normal. He has wheezes.   Abdominal: Soft. Bowel sounds are normal. He exhibits no distension and no mass. There is no tenderness. There is no guarding.   Musculoskeletal: He exhibits no edema or tenderness.   Neurological: He is alert. He exhibits abnormal muscle tone.   Left sided weakness   Skin: Skin is warm and dry.   Nursing note and vitals reviewed.      RESULTS   Results Review:      Results from last 7 days  Lab Units 17  0615 10/31/17  2133 10/31/17  0521 10/31/17  0327  10/30/17  0420   SODIUM mmol/L 139  --   --  138  --  140   SODIUM, ARTERIAL mmol/L  --  " 135.1* 136.7*  --   < >  --    POTASSIUM mmol/L 3.5  --   --  3.5  --  3.8   CHLORIDE mmol/L 106  --   --  106  --  104   CO2 mmol/L 24.0  --   --  21.0*  --  20.0*   BUN mg/dL 39*  --   --  59*  --  100*   CREATININE mg/dL 2.65*  --   --  3.89*  --  6.05*   CALCIUM mg/dL 7.6*  --   --  6.7*  --  6.5*   BILIRUBIN mg/dL 0.5  --   --  0.2  --  0.4   ALK PHOS U/L 50  --   --  41  --  45   ALT (SGPT) U/L 25  --   --  29  --  29   AST (SGOT) U/L 35  --   --  32  --  31   GLUCOSE mg/dL 144*  --   --  141*  --  135*   GLUCOSE, ARTERIAL mmol/L  --  165 144  --   < >  --    < > = values in this interval not displayed.    Estimated Creatinine Clearance: 25.4 mL/min (by C-G formula based on Cr of 2.65).      Results from last 7 days  Lab Units 11/01/17  0615 10/31/17  0327 10/30/17  0420  10/27/17  0801   MAGNESIUM mg/dL 1.8 1.8 1.8  < > 1.7   PHOSPHORUS mg/dL 5.5* 7.1*  --   --  7.6*   < > = values in this interval not displayed.    Results from last 7 days  Lab Units 11/01/17  0615 10/31/17  0327 10/30/17  0420 10/29/17  0456 10/28/17  0419   WBC 10*3/mm3 9.59 7.88 10.25* 11.39* 12.96*   HEMOGLOBIN g/dL 8.0* 7.8* 8.8* 8.2* 10.0*   PLATELETS 10*3/mm3 100* 97* 111* 121* 106*         Results from last 7 days  Lab Units 10/26/17  2318   INR  1.15         Imaging Results (last 24 hours)     Procedure Component Value Units Date/Time    IR insert non-tunneled CVC 5+ [[id number]] Resulted:  10/31/17 1017     Updated:  10/31/17 1018    Narrative:       OPERATIVE NOTE  Héctor Altamirano Hayes  1955  10/30/2017    PREOP DIAGNOSES:  Acute kidney injury requiring short term hemodialysis.    POSTOP DIAGNOSES:   Acute kidney injury requiring short term hemodialysis.    PROCEDURE:   Placement non-tunnelled central venous catheter (13Fr trialysis)  Vascular ultrasound guidance    SURGEON: BRETT Clancy MD FACS RPVI    ASSISTANT: Julian Fox RT     ANESTHESIA: local 1% lidocaine    COMPLICATIONS: none    DESCRIPTION OF OPERATION: In CCU,  patient placed in supine position,   prepped and draped in sterile fashion.  Vascular ultrasound was used to   identify the right common femoral vein which was 10mm in diameter and   patent.  Cannulated via modified Seldinger technique with mini stick under   ultrasound guidance.  Exchanged for a 0.035 guidewire and serial dilators.    A 13Fr Trialysis catheter was placed, aspirated and flushed without   difficulty with Hep-Lock solution.  1000U/mL heparin instilled into   lumens. catheter secured to the skin with 2-0 nylon suture.  Sterile   dressing applied. patient tolerated procedure well. dialysis notified.             This document has been electronically signed by Salvador Clancy MD   on October 31, 2017 10:18 AM           XR Chest 1 View [[id number]] Updated:  11/01/17 0531           MEDICATIONS      ceftriaxone 2 g Intravenous Q24H   chlorhexidine 15 mL Mouth/Throat Q12H   famotidine 10 mg Intravenous BID   methylPREDNISolone sodium succinate 80 mg Intravenous Q6H   sodium chloride 500 mL Intravenous Once       midazolam 1 mg/hr Last Rate: Stopped (10/31/17 0545)   norepinephrine 0.02-0.3 mcg/kg/min Last Rate: Stopped (10/27/17 0814)   Pharmacy to dose vancomycin     sodium chloride 50 mL/hr Last Rate: 50 mL/hr (11/01/17 0306)       Assessment/Plan   ASSESSMENT / PLAN    Principal Problem:    Pneumonia of both lungs due to infectious organism  Active Problems:    Decompensated COPD with exacerbation (chronic obstructive pulmonary disease)    Chronic pain    Acute on chronic respiratory failure with hypoxia and hypercapnia    Acute renal failure (ARF)    Hypokalemia    Cachexia    Anemia    GI bleeding      1. Acute kidney injury: Baseline creatinine 0.5-0.6 mg/dl.   - Etiology of ALLAN is pre-renal from decreased PO intake, sepsis leading to ATN.   - Admitted with a creatinine of 7.2 mg/dl. No real change in cr and therefore started hd 10/30/17. Hd again tomorrow unless has some recovery.      2. Severe  sepsis with endocarditis:  - Blood cultures positive for Enterococcus. Echo showed mitral valve vegetation, and possible septic emboli with left sided weakness. repeat culture NGSF  - On  vancomycin . Ct negative for infarct    3. Anemia:   - ? Blood loss with h/o melena. No plans for scope at this time per GI.  - s/p 2 units of pRBC transfusion, no plan for PRBC today    4. Chronic hepatitis C             This document has been electronically signed by James Mcmanus MD on November 1, 2017 9:44 AM

## 2017-11-02 ENCOUNTER — APPOINTMENT (OUTPATIENT)
Dept: ULTRASOUND IMAGING | Facility: HOSPITAL | Age: 62
End: 2017-11-02

## 2017-11-02 ENCOUNTER — APPOINTMENT (OUTPATIENT)
Dept: GENERAL RADIOLOGY | Facility: HOSPITAL | Age: 62
End: 2017-11-02

## 2017-11-02 ENCOUNTER — APPOINTMENT (OUTPATIENT)
Dept: INTERVENTIONAL RADIOLOGY/VASCULAR | Facility: HOSPITAL | Age: 62
End: 2017-11-02

## 2017-11-02 LAB
ALBUMIN SERPL-MCNC: 2.7 G/DL (ref 3.4–4.8)
ALBUMIN/GLOB SERPL: 0.7 G/DL (ref 1.1–1.8)
ALP SERPL-CCNC: 59 U/L (ref 38–126)
ALT SERPL W P-5'-P-CCNC: 34 U/L (ref 21–72)
ANION GAP SERPL CALCULATED.3IONS-SCNC: 14 MMOL/L (ref 5–15)
AST SERPL-CCNC: 39 U/L (ref 17–59)
BASOPHILS # BLD AUTO: 0.01 10*3/MM3 (ref 0–0.2)
BASOPHILS NFR BLD AUTO: 0.1 % (ref 0–2)
BILIRUB SERPL-MCNC: 0.5 MG/DL (ref 0.2–1.3)
BUN BLD-MCNC: 42 MG/DL (ref 7–21)
BUN/CREAT SERPL: 12.7 (ref 7–25)
CALCIUM SPEC-SCNC: 8 MG/DL (ref 8.4–10.2)
CHLORIDE SERPL-SCNC: 105 MMOL/L (ref 95–110)
CO2 SERPL-SCNC: 23 MMOL/L (ref 22–31)
CREAT BLD-MCNC: 3.32 MG/DL (ref 0.7–1.3)
DEPRECATED RDW RBC AUTO: 44.3 FL (ref 35.1–43.9)
EOSINOPHIL # BLD AUTO: 0.01 10*3/MM3 (ref 0–0.7)
EOSINOPHIL NFR BLD AUTO: 0.1 % (ref 0–7)
ERYTHROCYTE [DISTWIDTH] IN BLOOD BY AUTOMATED COUNT: 15.1 % (ref 11.5–14.5)
GFR SERPL CREATININE-BSD FRML MDRD: 19 ML/MIN/1.73 (ref 49–113)
GLOBULIN UR ELPH-MCNC: 3.7 GM/DL (ref 2.3–3.5)
GLUCOSE BLD-MCNC: 137 MG/DL (ref 60–100)
HCT VFR BLD AUTO: 25.7 % (ref 39–49)
HGB BLD-MCNC: 8.8 G/DL (ref 13.7–17.3)
IMM GRANULOCYTES # BLD: 0.1 10*3/MM3 (ref 0–0.02)
IMM GRANULOCYTES NFR BLD: 0.8 % (ref 0–0.5)
LYMPHOCYTES # BLD AUTO: 0.63 10*3/MM3 (ref 0.6–4.2)
LYMPHOCYTES NFR BLD AUTO: 4.8 % (ref 10–50)
MAGNESIUM SERPL-MCNC: 1.9 MG/DL (ref 1.6–2.3)
MCH RBC QN AUTO: 27.2 PG (ref 26.5–34)
MCHC RBC AUTO-ENTMCNC: 34.2 G/DL (ref 31.5–36.3)
MCV RBC AUTO: 79.6 FL (ref 80–98)
MONOCYTES # BLD AUTO: 0.52 10*3/MM3 (ref 0–0.9)
MONOCYTES NFR BLD AUTO: 4 % (ref 0–12)
NEUTROPHILS # BLD AUTO: 11.86 10*3/MM3 (ref 2–8.6)
NEUTROPHILS NFR BLD AUTO: 90.2 % (ref 37–80)
NRBC BLD MANUAL-RTO: 0 /100 WBC (ref 0–0)
PLATELET # BLD AUTO: 122 10*3/MM3 (ref 150–450)
PMV BLD AUTO: 9.1 FL (ref 8–12)
POTASSIUM BLD-SCNC: 3.4 MMOL/L (ref 3.5–5.1)
PROT SERPL-MCNC: 6.4 G/DL (ref 6.3–8.6)
RBC # BLD AUTO: 3.23 10*6/MM3 (ref 4.37–5.74)
SODIUM BLD-SCNC: 142 MMOL/L (ref 137–145)
WBC NRBC COR # BLD: 13.13 10*3/MM3 (ref 3.2–9.8)

## 2017-11-02 PROCEDURE — 99231 SBSQ HOSP IP/OBS SF/LOW 25: CPT | Performed by: INTERNAL MEDICINE

## 2017-11-02 PROCEDURE — G8978 MOBILITY CURRENT STATUS: HCPCS

## 2017-11-02 PROCEDURE — 25010000002 CEFTRIAXONE PER 250 MG: Performed by: INTERNAL MEDICINE

## 2017-11-02 PROCEDURE — 94799 UNLISTED PULMONARY SVC/PX: CPT

## 2017-11-02 PROCEDURE — 02HV33Z INSERTION OF INFUSION DEVICE INTO SUPERIOR VENA CAVA, PERCUTANEOUS APPROACH: ICD-10-PCS | Performed by: INTERNAL MEDICINE

## 2017-11-02 PROCEDURE — G8987 SELF CARE CURRENT STATUS: HCPCS

## 2017-11-02 PROCEDURE — 97162 PT EVAL MOD COMPLEX 30 MIN: CPT

## 2017-11-02 PROCEDURE — C1751 CATH, INF, PER/CENT/MIDLINE: HCPCS

## 2017-11-02 PROCEDURE — 97167 OT EVAL HIGH COMPLEX 60 MIN: CPT

## 2017-11-02 PROCEDURE — 97112 NEUROMUSCULAR REEDUCATION: CPT

## 2017-11-02 PROCEDURE — 85025 COMPLETE CBC W/AUTO DIFF WBC: CPT | Performed by: INTERNAL MEDICINE

## 2017-11-02 PROCEDURE — 71010 HC CHEST PA OR AP: CPT

## 2017-11-02 PROCEDURE — 83735 ASSAY OF MAGNESIUM: CPT | Performed by: INTERNAL MEDICINE

## 2017-11-02 PROCEDURE — 25010000002 CEFTRIAXONE: Performed by: INTERNAL MEDICINE

## 2017-11-02 PROCEDURE — 94760 N-INVAS EAR/PLS OXIMETRY 1: CPT

## 2017-11-02 PROCEDURE — 25010000002 METHYLPREDNISOLONE PER 125 MG: Performed by: INTERNAL MEDICINE

## 2017-11-02 PROCEDURE — 80053 COMPREHEN METABOLIC PANEL: CPT | Performed by: INTERNAL MEDICINE

## 2017-11-02 PROCEDURE — G8979 MOBILITY GOAL STATUS: HCPCS

## 2017-11-02 PROCEDURE — 76937 US GUIDE VASCULAR ACCESS: CPT

## 2017-11-02 PROCEDURE — B548ZZA ULTRASONOGRAPHY OF SUPERIOR VENA CAVA, GUIDANCE: ICD-10-PCS | Performed by: INTERNAL MEDICINE

## 2017-11-02 PROCEDURE — G8988 SELF CARE GOAL STATUS: HCPCS

## 2017-11-02 RX ORDER — SODIUM CHLORIDE 0.9 % (FLUSH) 0.9 %
10 SYRINGE (ML) INJECTION AS NEEDED
Status: DISCONTINUED | OUTPATIENT
Start: 2017-11-02 | End: 2017-11-03 | Stop reason: HOSPADM

## 2017-11-02 RX ORDER — SODIUM CHLORIDE 0.9 % (FLUSH) 0.9 %
10 SYRINGE (ML) INJECTION EVERY 12 HOURS SCHEDULED
Status: DISCONTINUED | OUTPATIENT
Start: 2017-11-02 | End: 2017-11-03 | Stop reason: HOSPADM

## 2017-11-02 RX ADMIN — ALBUTEROL SULFATE 2.5 MG: 2.5 SOLUTION RESPIRATORY (INHALATION) at 06:40

## 2017-11-02 RX ADMIN — METHYLPREDNISOLONE SODIUM SUCCINATE 80 MG: 125 INJECTION, POWDER, FOR SOLUTION INTRAMUSCULAR; INTRAVENOUS at 12:01

## 2017-11-02 RX ADMIN — ALBUTEROL SULFATE 2.5 MG: 2.5 SOLUTION RESPIRATORY (INHALATION) at 19:40

## 2017-11-02 RX ADMIN — METHYLPREDNISOLONE SODIUM SUCCINATE 80 MG: 125 INJECTION, POWDER, FOR SOLUTION INTRAMUSCULAR; INTRAVENOUS at 05:33

## 2017-11-02 RX ADMIN — AMPICILLIN SODIUM 2 G: 2 INJECTION, POWDER, FOR SOLUTION INTRAVENOUS at 12:02

## 2017-11-02 RX ADMIN — FAMOTIDINE 10 MG: 10 INJECTION, SOLUTION INTRAVENOUS at 18:20

## 2017-11-02 RX ADMIN — CEFTRIAXONE 2 G: 2 INJECTION, POWDER, FOR SOLUTION INTRAMUSCULAR; INTRAVENOUS at 12:01

## 2017-11-02 RX ADMIN — FAMOTIDINE 10 MG: 10 INJECTION, SOLUTION INTRAVENOUS at 10:45

## 2017-11-02 RX ADMIN — CHLORHEXIDINE GLUCONATE 15 ML: 1.2 RINSE ORAL at 20:51

## 2017-11-02 RX ADMIN — METHYLPREDNISOLONE SODIUM SUCCINATE 80 MG: 125 INJECTION, POWDER, FOR SOLUTION INTRAMUSCULAR; INTRAVENOUS at 18:19

## 2017-11-02 RX ADMIN — ALBUTEROL SULFATE 2.5 MG: 2.5 SOLUTION RESPIRATORY (INHALATION) at 01:18

## 2017-11-02 RX ADMIN — CEFTRIAXONE 2 G: 2 INJECTION, POWDER, FOR SOLUTION INTRAMUSCULAR; INTRAVENOUS at 23:13

## 2017-11-02 RX ADMIN — Medication 10 ML: at 20:51

## 2017-11-02 RX ADMIN — ALBUTEROL SULFATE 2.5 MG: 2.5 SOLUTION RESPIRATORY (INHALATION) at 13:14

## 2017-11-02 RX ADMIN — CHLORHEXIDINE GLUCONATE 15 ML: 1.2 RINSE ORAL at 10:45

## 2017-11-02 RX ADMIN — SODIUM CHLORIDE 50 ML/HR: 9 INJECTION, SOLUTION INTRAVENOUS at 02:20

## 2017-11-02 RX ADMIN — AMPICILLIN SODIUM 2 G: 2 INJECTION, POWDER, FOR SOLUTION INTRAVENOUS at 03:05

## 2017-11-02 RX ADMIN — AMPICILLIN SODIUM 2 G: 2 INJECTION, POWDER, FOR SOLUTION INTRAVENOUS at 18:45

## 2017-11-02 NOTE — PLAN OF CARE
Problem: Patient Care Overview (Adult)  Goal: Plan of Care Review  Outcome: Ongoing (interventions implemented as appropriate)    11/02/17 1433   Outcome Evaluation   Outcome Summary/Follow up Plan PT evaluation completed today. 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 He requires dependent assistx2 to sit EOB and balance EOB. He will require prolonged rehab in LTACH vs SNF    Coping/Psychosocial Response Interventions   Plan Of Care Reviewed With patient         Problem: Inpatient Physical Therapy  Goal: Bed Mobility Goal STG- PT  Outcome: Ongoing (interventions implemented as appropriate)    11/02/17 1433   Bed Mobility PT STG   Bed Mobility PT STG, Date Established 11/02/17   Bed Mobility PT STG, Time to Achieve 1 wk   Bed Mobility PT STG, Activity Type roll left/roll right;supine to sit/sit to supine   Bed Mobility PT STG, [place] Level moderate assist (50% patient effort)   Transfer Training Goal, Assist Device bed rails       Goal: Transfer Training Goal 1 STG- PT  Outcome: Ongoing (interventions implemented as appropriate)    11/02/17 1433   Transfer Training PT STG   Transfer Training PT STG, Date Established 11/02/17   Transfer Training PT STG, Time to Achieve 1 wk   Transfer Training PT STG, Activity Type bed to chair /chair to bed;toilet   Transfer Training PT STG, [place] Level maximum assist (25% patient effort)   Transfer Training PT STG, Assist Device (via scoot/squat pivot transfer)       Goal: Static Sitting Balance Goal STG- PT  Outcome: Ongoing (interventions implemented as appropriate)    11/02/17 1433   Static Sitting Balance PT STG   Static Sitting Balance PT STG, Date Established 11/02/17   Static Sitting Balance PT STG, Time to Achieve 2 wks   Static Sitting Balance PT STG, [place] Level moderate assist (50% patient effort)   Static Sitting Balance PT STG, Assist Device UE Support   Static Sitting Balance PT STG,  Additional Goal 20 minutes without LOB

## 2017-11-02 NOTE — THERAPY EVALUATION
Acute Care - Physical Therapy Initial Evaluation  [facility]     Patient Name: Héctor Koo  : 1955  MRN: [id number]  Today's Date: 2017   Onset of Illness/Injury or Date of Surgery Date: 10/26/17  Date of Referral to PT: 17  Referring Physician: Dr. Chambers      Admit Date: 10/26/2017     Visit Dx:    ICD-10-CM ICD-9-CM   1. Pneumonia of both lungs due to infectious organism, unspecified part of lung J18.9 483.8   2. Type I respiratory failure J96.90 518.81   3. Leukocytosis, unspecified type D72.829 288.60   4. Hypokalemia E87.6 276.8   5. Acute renal failure, unspecified acute renal failure type N17.9 584.9   6. Dehydration E86.0 276.51   7. Elevated troponin R74.8 790.6   8. Altered mental status, unspecified altered mental status type R41.82 780.97   9. Chronic hepatitis C without hepatic coma B18.2 070.54   10. Uremia N19 586   11. Anemia, unspecified type D64.9 285.9   12. Thrombocytopenia D69.6 287.5   13. Impaired functional mobility, balance, gait, and endurance Z74.09 V49.89   14. Decreased activities of daily living (ADL) Z78.9 V49.89     Patient Active Problem List   Diagnosis   • Decompensated COPD with exacerbation (chronic obstructive pulmonary disease)   • Chronic pain   • Acute on chronic respiratory failure with hypoxia and hypercapnia   • Pneumonia of both lungs due to infectious organism   • Atherosclerosis   • Acute renal failure (ARF)   • Hypokalemia   • Cachexia   • Anemia   • GI bleeding     Past Medical History:   Diagnosis Date   • Atherosclerosis    • Chronic obstructive lung disease    • Chronic pain    • Drug dependence    • Hypertension      Past Surgical History:   Procedure Laterality Date   • DIAPHRAGMATIC HERNIA REPAIR     • HIP SURGERY     • LIVER BIOPSY            PT ASSESSMENT (last 72 hours)      PT Evaluation       17 1329 17 1315    Rehab Evaluation    Document Type evaluation  -MN     Evaluation Not Performed  other (see comments)    Swallow evaluation started and pt presentd with s/s of aspiration on two ice chips and once sip of water. Discontinued and notified nursing of pt not alert and safe for po  intake.    -EK    Patient Effort, Rehab Treatment fair  -MN     General Information    Patient Profile Review yes  -MN     Onset of Illness/Injury or Date of Surgery Date 10/26/17  -MN     Referring Physician Dr. Chambers  -MN     General Observations Supine HOB up +tele +sanchez +left groin temp HD catheter +2L O2  -MN     Pertinent History Of Current Problem Pt admitted with AMS at home. Intubated 10/26 and he self extubated 11/1. He was dx with bilateral PNE, resp failure, COPD exacerbation, ALLAN requiring HD catheter placed 10/30, GI bleed, mitral valce prolapse and had episode of bradycardia 10/30. He then developed left sided weakness and dx with right BG ischemic infarct  -MN     Precautions/Limitations fall precautions;oxygen therapy device and L/min;other (see comments)   no sit at 90* for long periods of time and left hip flex <90  -MN     Prior Level of Function --   no family present and pt unable verbalize  -MN     Benefits Reviewed patient:;improve function;increase independence;increase strength;increase balance;decrease pain;decrease risk of DVT;improve skin integrity;increase knowledge  -MN     Barriers to Rehab medically complex  -MN     Living Environment    Lives With friend(s)  -MN     Living Arrangements mobile home  -MN     Home Accessibility stairs to enter home  -MN     Number of Stairs to Enter Home 3  -MN     Stair Railings at Home outside, present on right side  -MN     Living Environment Comment information obtained from CM note  -MN     Clinical Impression    Date of Referral to PT 11/02/17  -MN     PT Diagnosis Debility 2* 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  -MN     Patient/Family Goals Statement none stated  -MN     Criteria for Skilled Therapeutic Interventions Met yes;treatment indicated  -MN      Pathology/Pathophysiology Noted (Describe Specifically for Each System) musculoskeletal;neuromuscular;integumentary  -MN     Impairments Found (describe specific impairments) aerobic capacity/endurance;arousal, attention, and cognition;cranial and peripheral nerve integrity;integumentary integrity;gait, locomotion, and balance  -MN     Rehab Potential fair, will monitor progress closely  -MN     Predicted Duration of Therapy Intervention (days/wks) until d/c  -MN     Vital Signs    Pre Systolic BP Rehab 150  -MN     Pre Treatment Diastolic BP 89  -MN     Post Systolic BP Rehab 138  -MN     Post Treatment Diastolic BP 59  -MN     Pretreatment Heart Rate (beats/min) 71  -MN     Intratreatment Heart Rate (beats/min) 55  -MN     Posttreatment Heart Rate (beats/min) 59  -MN     Pre SpO2 (%) 100  -MN     O2 Delivery Pre Treatment supplemental O2   2L  -MN     Post SpO2 (%) 100  -MN     O2 Delivery Post Treatment supplemental O2  -MN     Pre Patient Position Supine  -MN     Intra Patient Position Sitting  -MN     Post Patient Position Supine  -MN     Pain Assessment    Pain Assessment FLACC  -MN     FLACC (Face, Legs, Activity, Crying, Consolability)    Pain Rating: FLACC (Rest) - Face 0  -MN     Pain Rating: FLACC (Rest) - Legs 1  -MN     Pain Rating: FLACC (Rest) - Activity 0  -MN     Pain Rating: FLACC (Rest) - Cry 0  -MN     Pain Rating: FLACC (Rest) - Consolability 0  -MN     Score: FLACC (Rest) 1  -MN     Pain Rating: FLACC (Activity) - Face 0  -MN     Pain Rating: FLACC (Activity) - Legs 0  -MN     Pain Rating: FLACC (Activity) 0  -MN     Pain Rating: FLACC (Activity) - Cry 1  -MN     Pain Rating: FLACC (Activity) - Consolability 0  -MN     Score: FLACC (Activity) 1  -MN     Vision Assessment/Intervention    Visual Impairment decreased tracking across midline;decreased visual tracking;inattention to the left  -MN     Cognitive Assessment/Intervention    Current Cognitive/Communication Assessment impaired  -MN      Orientation Status oriented to;person  -MN     Follows Commands/Answers Questions 25% of the time;able to follow single-step instructions  -MN     Personal Safety severe impairment;decreased awareness, need for safety;decreased awareness, need for assist  -MN     ROM (Range of Motion)    General ROM no range of motion deficits identified  -MN     MMT (Manual Muscle Testing)    General MMT Assessment lower extremity strength deficits identified  -MN     Lower Extremity    Lower Ext Manual Muscle Testing Detail RLE: hip flex 2+/5, knee ext 3-/5, knee flex 2/5, DF 2-/5, PF 2-/5 LLE: 0/5 grossly  -MN     Muscle Tone Assessment    Muscle Tone Assessment Left-Side Extremities;LUE;LLE  -MN     Left-Side Extremities Muscle Tone Assessment flaccid  -MN     Bed Mobility, Assessment/Treatment    Bed Mob, Supine to Sit, [place] dependent (less than 25% patient effort);2 person assist required  -MN     Bed Mob, Sit to Supine, [place] dependent (less than 25% patient effort);2 person assist required  -MN     Bed Mobility, Comment 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 Pt would not turn head or eyes to left but was able to attend to his own left hand at midline.   -MN     Balance Skills Training    Sitting-Level of Assistance Dependent;x2  -MN     Sitting-Balance Support Right upper extremity supported;Feet unsupported  -MN     Sitting-Balance Activities Right UE Weight Bearing;Lateral lean;Forward lean  -MN     Sitting # of Minutes 12  -MN     Sensory Assessment/Intervention    Light Touch --  -MN     Deep Pressure --  -MN     Pain Sensation LLE;RLE  -MN     LLE Pain Sensation absent sensation  -MN     RLE Pain Sensation WNL   localizes to noxious stimuli  -MN     Edema Management    Edema Amount left:;moderate   UE and LE  -MN     Positioning and Restraints    Pre-Treatment Position in bed  -MN     Post Treatment Position bed  -MN     In Bed  side lying right;notified nsg;patient within staff view  -MN       11/02/17 1121 11/02/17 1042    Rehab Evaluation    Document Type evaluation   See MAR  -ND     Subjective Information unable to respond  -ND     Patient Effort, Rehab Treatment poor  -ND     Symptoms Noted Comment Entered room 1329  -ND     General Information    Patient Profile Review yes  -ND     Onset of Illness/Injury or Date of Surgery Date 10/26/17  -ND     Referring Physician Dr. Chambers  -ND     General Observations fowlers, wedge R side, IV, sanchez, dialysis cath L groin  -ND     Pertinent History Of Current Problem Pt. admitted d/t being found unresponsive with decreased O2. H&H 8.8/25.7 XR chest, CT head- large metallic plate along R parietal skull. Dx: pneumonia with sepsis. Has been on vent but self extubated, respiratory failure, ARF, hypokalemia, cachaxia, anemia, GI bleeding, chronic hep C  -ND     Precautions/Limitations fall precautions   L side flaccid  -ND     Prior Level of Function --   unable to obtain   -ND     Equipment Currently Used at Home --   unable to obtain  -ND cane, straight  -CM    Plans/Goals Discussed With patient;agreed upon  -ND     Risks Reviewed patient:;LOB;nausea/vomiting;dizziness;increased discomfort;change in vital signs;increased drainage;lines disloged  -ND     Benefits Reviewed patient:;improve function;increase independence;increase strength;increase balance;decrease pain;decrease risk of DVT;improve skin integrity;increase knowledge  -ND     Barriers to Rehab medically complex;previous functional deficit;cognitive status;visual deficit;physical barrier;impaired sensation  -ND     Living Environment    Lives With  friend(s)  -CM    Living Arrangements  mobile home  -CM    Transportation Available  family or friend will provide  -CM    Living Environment Comment unable to obtain d/t pt. speech being uncomprehendible and garbled and decreased cognition  -ND     Vital Signs    Pre Systolic BP Rehab 150   -ND     Pre Treatment Diastolic BP 89  -ND     Post Systolic BP Rehab 138  -ND     Post Treatment Diastolic BP 59  -ND     Pretreatment Heart Rate (beats/min) 71  -ND     Intratreatment Heart Rate (beats/min) 55  -ND     Posttreatment Heart Rate (beats/min) 59  -ND     Pre SpO2 (%) 100  -ND     O2 Delivery Pre Treatment supplemental O2   2L O2  -ND     Post SpO2 (%) 100  -ND     O2 Delivery Post Treatment supplemental O2   2L O2  -ND     Pre Patient Position Supine  -ND     Intra Patient Position Sitting  -ND     Post Patient Position Supine  -ND     Pain Assessment    Pain Assessment FLACC  -ND     FLACC (Face, Legs, Activity, Crying, Consolability)    Pain Rating: FLACC (Rest) - Face 0  -ND     Pain Rating: FLACC (Rest) - Legs 1  -ND     Pain Rating: FLACC (Rest) - Activity 0  -ND     Pain Rating: FLACC (Rest) - Cry 0  -ND     Pain Rating: FLACC (Rest) - Consolability 0  -ND     Score: FLACC (Rest) 1  -ND     Pain Rating: FLACC (Activity) - Face 0  -ND     Pain Rating: FLACC (Activity) - Legs 0  -ND     Pain Rating: FLACC (Activity) 0  -ND     Pain Rating: FLACC (Activity) - Cry 1  -ND     Pain Rating: FLACC (Activity) - Consolability 0  -ND     Score: FLACC (Activity) 1  -ND     Vision Assessment/Intervention    Visual Impairment decreased visual tracking;inattention to the left;ptosis  -ND     Visual Impairment Comment Pt. had gaze preference to R side and did not respond to startle reflex on L side. Pt. would not track to L side with max verbal and tactile cues.   -ND     Visual Scanning severe impairment, scan to left  -ND     Cognitive Assessment/Intervention    Current Cognitive/Communication Assessment impaired  -ND     Orientation Status oriented to;person;disoriented to;place;time;situation  -ND     Follows Commands/Answers Questions 25% of the time;able to follow single-step instructions;needs cueing;needs increased time;needs repetition  -ND     Personal Safety severe impairment;decreased awareness,  need for safety;decreased awareness, need for assist;decreased insight to deficits  -ND     Personal Safety Interventions fall prevention program maintained;muscle strengthening facilitated;nonskid shoes/slippers when out of bed;supervised activity  -ND     ROM (Range of Motion)    General ROM upper extremity range of motion deficits identified  -ND     General ROM Detail R shoulder impaired approx 50% pt. very resistive unable to assess full ROM, R elbow, wrist and digits impaired approx 25%. % impaired  -ND     MMT (Manual Muscle Testing)    General MMT Assessment Detail RUE 3-/5 functionally, LUE 0/55  -ND     Muscle Tone Assessment    Muscle Tone Assessment Left-Side Extremities  -ND     LUE Muscle Tone Assessment flaccid  -ND     Bed Mobility, Assessment/Treatment    Bed Mobility, Assistive Device head of bed elevated  -ND     Bed Mobility, Scoot/Bridge, [place] verbal cues required;nonverbal cues required (demo/gesture);dependent (less than 25% patient effort);2 person assist required  -ND     Bed Mob, Supine to Sit, [place] verbal cues required;nonverbal cues required (demo/gesture);dependent (less than 25% patient effort);2 person assist required  -ND     Bed Mob, Sit to Supine, [place] verbal cues required;nonverbal cues required (demo/gesture);dependent (less than 25% patient effort);2 person assist required  -ND     Bed Mobility, Safety Issues decreased use of legs for bridging/pushing;impaired trunk control for bed mobility;decreased use of arms for pushing/pulling;cognitive deficits limit understanding  -ND     Bed Mobility, Impairments ROM decreased;sensation decreased;strength decreased;impaired balance;postural control impaired  -ND     Bed Mobility, Comment Pt. sat EOB approx 12 min requiring Dep/Depx2 for sitting balance with constant verbal and tactile cues to remain in upright position. Pt. demonstrated severe lean to L side and flexed head position  -ND     Transfer  Assessment/Treatment    Transfer, Comment Deferred not safe at this time  -ND     Motor Skills/Interventions    Additional Documentation Balance Skills Training (Group);Fine Motor Coordination Training (Group);Gross Motor Coordination Training (Group)  -ND     Balance Skills Training    Sitting-Level of Assistance Dependent;x2  -ND     Sitting-Balance Support Right upper extremity supported;Feet supported  -ND     Sitting-Balance Activities Right UE Weight Bearing;Forward lean;Lateral lean  -ND     Fine Motor Coordination Training    Opposition Right:;Left:;impaired  -ND     Gross Motor Coordination Training    Gross Motor Skill, Impairments Detail Formal GM testing not completed d/t pt. being flaccid on LUE and decreased cognition limited accuracy of RUE  -ND     Sensory Assessment/Intervention    Light Touch LUE;RUE  -ND     LUE Light Touch absent sensation  -ND     RUE Light Touch mild impairment  -ND     Deep Pressure LUE;RUE  -ND     LUE Deep Pressure severe impairment   Did not withdrawl from depp stimulus  -ND     RUE Deep Pressure --   withdrew from painful stimulus  -ND     Edema Management    Edema Amount right:;left:   BLE and BUE  -ND     Positioning and Restraints    Pre-Treatment Position in bed  -ND     Post Treatment Position bed  -ND     In Bed side lying right;call light within reach;encouraged to call for assist;patient within staff view;side rails up x2;SCD pump applied;LUE elevated;RUE elevated  -ND       11/02/17 0800 11/02/17 0404    Muscle Tone Assessment    Muscle Tone Assessment LUE;RUE;LLE;RLE  -SR Bilateral Upper Extremities;Bilateral Lower Extremities  -DS    LUE Muscle Tone Assessment flaccid  -SR flaccid  -DS    RUE Muscle Tone Assessment mildly decreased tone  -SR mildly decreased tone  -DS    LLE Muscle Tone Assessment severely decreased tone   pt. able to move foot but unable to raise leg  -SR associated movements noted   Moves foot  -DS    RLE Muscle Tone Assessment mildly  decreased tone  -SR mildly increased tone  -DS      11/02/17 0008 11/01/17 2008    Muscle Tone Assessment    Muscle Tone Assessment Bilateral Upper Extremities;Bilateral Lower Extremities  -DS Bilateral Upper Extremities;Bilateral Lower Extremities  -DS    LUE Muscle Tone Assessment flaccid  -DS flaccid  -DS    RUE Muscle Tone Assessment mildly decreased tone  -DS mildly decreased tone  -DS    LLE Muscle Tone Assessment associated movements noted   Moves foot  -DS associated movements noted   Moves foot  -DS    RLE Muscle Tone Assessment mildly increased tone  -DS mildly increased tone  -DS      11/01/17 1600 11/01/17 1535    Rehab Evaluation    Evaluation Not Performed  patient unavailable for evaluation  -EC    Muscle Tone Assessment    Muscle Tone Assessment Bilateral Upper Extremities;Bilateral Lower Extremities  -DT     LUE Muscle Tone Assessment flaccid  -DT     RUE Muscle Tone Assessment mildly decreased tone  -DT     LLE Muscle Tone Assessment associated movements noted   Moves foot  -DT     RLE Muscle Tone Assessment mildly increased tone  -DT       11/01/17 1215 11/01/17 0815    Muscle Tone Assessment    Muscle Tone Assessment Bilateral Upper Extremities;Bilateral Lower Extremities  -DT Bilateral Upper Extremities;Bilateral Lower Extremities  -DT    LUE Muscle Tone Assessment flaccid  -DT flaccid  -DT    RUE Muscle Tone Assessment mildly decreased tone  -DT mildly decreased tone  -DT    LLE Muscle Tone Assessment associated movements noted   Moves foot  -DT associated movements noted   Moves foot  -DT    RLE Muscle Tone Assessment mildly increased tone  -DT mildly increased tone  -DT      11/01/17 0400 11/01/17 0000    Muscle Tone Assessment    Muscle Tone Assessment Bilateral Upper Extremities;Bilateral Lower Extremities  -DS Bilateral Upper Extremities;Bilateral Lower Extremities  -DS    Bilateral Upper Extremities Muscle Tone Assessment mildly decreased tone  -DS mildly decreased tone  -DS    Bilateral  Lower Extremities Muscle Tone Assessment mildly decreased tone  -DS mildly decreased tone  -DS      10/31/17 2008 10/31/17 1600    Muscle Tone Assessment    Muscle Tone Assessment Bilateral Upper Extremities;Bilateral Lower Extremities  -DS Bilateral Upper Extremities;Bilateral Lower Extremities  -MM    Bilateral Upper Extremities Muscle Tone Assessment mildly decreased tone  -DS mildly decreased tone  -MM    Bilateral Lower Extremities Muscle Tone Assessment mildly decreased tone  -DS mildly decreased tone  -MM      10/31/17 1200 10/31/17 0700    Muscle Tone Assessment    Muscle Tone Assessment Bilateral Upper Extremities;Bilateral Lower Extremities  -MM Bilateral Upper Extremities;Bilateral Lower Extremities  -MM    Bilateral Upper Extremities Muscle Tone Assessment mildly decreased tone  -MM mildly decreased tone  -MM    Bilateral Lower Extremities Muscle Tone Assessment mildly decreased tone  -MM mildly decreased tone  -MM      10/31/17 0330 10/30/17 2330    Muscle Tone Assessment    Muscle Tone Assessment LUE;RUE;LLE;RLE  -MS LUE;RUE;LLE;RLE  -MS    LUE Muscle Tone Assessment flaccid   movement only noted from the shoulder  -MS flaccid   movement only noted from the shoulder  -MS    RUE Muscle Tone Assessment mildly decreased tone  -MS mildly decreased tone  -MS    LLE Muscle Tone Assessment flaccid   slight response on foot to cap nailbed pressure/no leg mvmt  -MS flaccid   slight response on foot to cap nailbed pressure/no leg mvmt  -MS    RLE Muscle Tone Assessment mildly decreased tone  -MS mildly decreased tone  -MS      10/30/17 2133 10/30/17 2000    Muscle Tone Assessment    Muscle Tone Assessment --  -MS LUE;RUE;LLE;RLE  -MS    LUE Muscle Tone Assessment --  -MS flaccid   movement only noted from the shoulder  -MS    RUE Muscle Tone Assessment --  -MS mildly decreased tone  -MS    Bilateral Upper Extremities Muscle Tone Assessment  --  -MS    LLE Muscle Tone Assessment --  -MS flaccid   slight response  on foot to cap nailbed pressure/no leg mvmt  -MS    RLE Muscle Tone Assessment --  -MS mildly decreased tone  -MS    Bilateral Lower Extremities Muscle Tone Assessment  --  -MS      10/30/17 1600       Muscle Tone Assessment    Muscle Tone Assessment Bilateral Upper Extremities;Bilateral Lower Extremities  -MM     Bilateral Upper Extremities Muscle Tone Assessment mildly decreased tone  -MM     Bilateral Lower Extremities Muscle Tone Assessment mildly decreased tone  -MM       User Key  (r) = Recorded By, (t) = Taken By, (c) = Cosigned By    Initials Name Provider Type    JOSH Styles, CCC-SLP Speech and Language Pathologist    EK Corinne Gonzalez, CCC-SLP Speech and Language Pathologist    MN Norah Garcia, PT Physical Therapist    MM Ashley Carranza, RN Registered Nurse    KEI Medina, RN Registered Nurse    NORMAN Salcido, RN Registered Nurse    SR Susan Su, RN Registered Nurse    MS Darlin Melendez, RN Registered Nurse    SARANYA Lipscomb, W     ND Samantha Rocha, OTR/L Occupational Therapist          Physical Therapy Education     Title: PT OT SLP Therapies (Active)     Topic: Physical Therapy (Active)     Point: Mobility training (Active)    Learning Progress Summary    Learner Readiness Method Response Comment Documented by Status   Patient Nonacceptance E NR importance of mobility MN 11/02/17 1436 Active                      User Key     Initials Effective Dates Name Provider Type Discipline    MN 10/17/16 -  Norah Garcia, PT Physical Therapist PT                PT Recommendation and Plan  Anticipated Equipment Needs At Discharge:  (TBD)  Anticipated Discharge Disposition: skilled nursing facility, long term acute care facility  Planned Therapy Interventions: balance training, bed mobility training, home exercise program, strengthening, stretching, transfer training, wheelchair management/propulsion training, postural re-education, neuromuscular  re-education  PT Frequency: other (see comments) (5-14x/week)  Plan of Care Review  Plan Of Care Reviewed With: patient  Outcome Summary/Follow up Plan: PT evaluation completed today. 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 He requires dependent assistx2 to sit EOB and balance EOB. He will require prolonged rehab in LTACH vs SNF           IP PT Goals       11/02/17 1433          Bed Mobility PT STG    Bed Mobility PT STG, Date Established 11/02/17  -MN      Bed Mobility PT STG, Time to Achieve 1 wk  -MN      Bed Mobility PT STG, Activity Type roll left/roll right;supine to sit/sit to supine  -MN      Bed Mobility PT STG, [place] Level moderate assist (50% patient effort)  -MN      Transfer Training Goal, Assist Device bed rails  -MN      Transfer Training PT STG    Transfer Training PT STG, Date Established 11/02/17  -MN      Transfer Training PT STG, Time to Achieve 1 wk  -MN      Transfer Training PT STG, Activity Type bed to chair /chair to bed;toilet  -MN      Transfer Training PT STG, [place] Level maximum assist (25% patient effort)  -MN      Transfer Training PT STG, Assist Device --   via scoot/squat pivot transfer  -MN      Static Sitting Balance PT STG    Static Sitting Balance PT STG, Date Established 11/02/17  -MN      Static Sitting Balance PT STG, Time to Achieve 2 wks  -MN      Static Sitting Balance PT STG, [place] Level moderate assist (50% patient effort)  -MN      Static Sitting Balance PT STG, Assist Device UE Support  -MN      Static Sitting Balance PT STG, Additional Goal 20 minutes without LOB  -MN        User Key  (r) = Recorded By, (t) = Taken By, (c) = Cosigned By    Initials Name Provider Type    CHAPARRITA Garcia, PT Physical Therapist                Outcome Measures       11/02/17 1400 11/02/17 1329       How much help from another person do you currently need...    Turning from your back to your side  while in flat bed without using bedrails?  1  -MN     Moving from lying on back to sitting on the side of a flat bed without bedrails?  1  -MN     Moving to and from a bed to a chair (including a wheelchair)?  1  -MN     Standing up from a chair using your arms (e.g., wheelchair, bedside chair)?  1  -MN     Climbing 3-5 steps with a railing?  1  -MN     To walk in hospital room?  1  -MN     AM-PAC 6 Clicks Score  6  -MN     How much help from another is currently needed...    Putting on and taking off regular lower body clothing? 1  -ND      Bathing (including washing, rinsing, and drying) 1  -ND      Toileting (which includes using toilet bed pan or urinal) 1  -ND      Putting on and taking off regular upper body clothing 1  -ND      Taking care of personal grooming (such as brushing teeth) 1  -ND      Eating meals 1  -ND      Score 6  -ND      Functional Assessment    Outcome Measure Options AM-PAC 6 Clicks Daily Activity (OT)  -ND AM-[facility] 6 Clicks Basic Mobility (PT)  -MN       User Key  (r) = Recorded By, (t) = Taken By, (c) = Cosigned By    Initials Name Provider Type    MN Norah Garcia PT Physical Therapist    ND Samantha Rocha, OTR/L Occupational Therapist           Time Calculation:         PT Charges       11/02/17 1432          Time Calculation    Start Time 1329  -MN      Stop Time 1410  -MN      Time Calculation (min) 41 min  -MN      PT Received On 11/02/17  -MN      PT Goal Re-Cert Due Date 11/15/17  -MN      Time Calculation- PT    Total Timed Code Minutes- PT 8 minute(s)  -MN        User Key  (r) = Recorded By, (t) = Taken By, (c) = Cosigned By    Initials Name Provider Type    MN Norah Garcia, PT Physical Therapist          Therapy Charges for Today     Code Description Service Date Service Provider Modifiers Qty    42317483836 HC PT MOBILITY CURRENT 11/2/2017 Norah Garcia, PT GP, CN 1    73164452796 HC PT MOBILITY PROJECTED 11/2/2017 Norah Garcia PT GP, CM 1    [id number]  PT  EVAL MOD COMPLEXITY 2 11/2/2017 Norah Garcia, PT GP 1    03977176778 HC PT NEUROMUSC RE EDUCATION EA 15 MIN 11/2/2017 Norah Garcia, PT GP 1          PT G-Codes  PT Professional Judgement Used?: Yes  Outcome Measure Options: AM-PAC 6 Clicks Daily Activity (OT)  Score: 6  Functional Limitation: Mobility: Walking and moving around  Mobility: Walking and Moving Around Current Status (): 100 percent impaired, limited or restricted  Mobility: Walking and Moving Around Goal Status (): At least 80 percent but less than 100 percent impaired, limited or restricted      Norah Garcia, PT  11/2/2017

## 2017-11-02 NOTE — SIGNIFICANT NOTE
11/02/17 1315   Rehab Treatment   Discipline speech language pathologist   Rehab Evaluation   Evaluation Not Performed other (see comments)  (Swallow evaluation started and pt presentd with s/s of aspiration on two ice chips and once sip of water. Discontinued and notified nursing of pt not alert and safe for po  intake.  )   Will assess in a.m.

## 2017-11-02 NOTE — PLAN OF CARE
Problem: Patient Care Overview (Adult)  Goal: Plan of Care Review  Outcome: Ongoing (interventions implemented as appropriate)    11/02/17 2325   Outcome Evaluation   Outcome Summary/Follow up Plan Pt extubated himself and is currently without nutrition support. SLP eval scheduled but I suspect that he will need nutrition support   Coping/Psychosocial Response Interventions   Plan Of Care Reviewed With caregiver   Patient Care Overview   Progress declining

## 2017-11-02 NOTE — CONSULTS
Nutrition Services    Patient Name:  Héctor Koo  YOB: 1955  MRN: [id number]  Admit Date:  10/26/2017    Pt seen and evaluate by SLP--pt not safe to take po.  DHT placed and tube feeding to be started per MD.  Will restart Nepro at 25cc/hr with goal rate of 45cc/hr.     Electronically signed by:  Eloisa Lan RD  11/02/17 4:07 PM

## 2017-11-02 NOTE — NURSING NOTE
Dialysis RN at bedside completing treatment at this time. Pt. Tolerated well. 1000ml dialysis fluid removed.

## 2017-11-02 NOTE — CONSULTS
Adult Nutrition  Assessment    Patient Name:  Héctor Koo  YOB: 1955  MRN: [id number]  Admit Date:  10/26/2017    Assessment Date:  11/2/2017    Comments:  Pt self extubated himself on 10/31/17.  He remains NPO with decreased alertness, S/p CT of the head. SLP has been consulted.  He continues to require dialysis. DXed with Pneumonia in both lungs; Chronic REsp FAilure; Acute Renal Failure; COPD exacerbation and Infective Endocarditis.  He was doing well with EN before he extubated himself.  He was at 30cc/hr of Nepro.  He may need DHT placement until he become more alert.  Nutrition intervention is very critical for this pt due to his already compromised nutritional status.  RD will monitor.           Reason for Assessment       11/02/17 1312    Reason for Assessment    Reason For Assessment/Visit follow up protocol                Nutrition/Diet History       11/02/17 1312    Nutrition/Diet History    Typical Food/Fluid Intake Pt resting--per staff he has been lethargic              Labs/Tests/Procedures/Meds       11/02/17 1312    Labs/Tests/Procedures/Meds    Labs/Tests Review Reviewed;K+;Glucose;BUN;Creat;Alb    Medication Review Reviewed, pertinent            Physical Findings       11/02/17 1313    Physical Appearance    Overall Physical Appearance loss of muscle mass;underweight              Nutrition Prescription Ordered       11/02/17 1314    Nutrition Prescription PO    Current PO Diet NPO            Evaluation of Received Nutrient/Fluid Intake       11/02/17 1314    EN Evaluation    TF Residual Tube feeding discontinued with pt extubating himself            Electronically signed by:  Eloisa Lan RD  11/02/17 1:20 PM

## 2017-11-02 NOTE — THERAPY EVALUATION
Acute Care - Occupational Therapy Initial Evaluation  [facility]     Patient Name: Héctor Koo  : 1955  MRN: [id number]  Today's Date: 2017  Onset of Illness/Injury or Date of Surgery Date: 10/26/17  Date of Referral to OT: 17  Referring Physician: Dr. Chambers    Admit Date: 10/26/2017       ICD-10-CM ICD-9-CM   1. Pneumonia of both lungs due to infectious organism, unspecified part of lung J18.9 483.8   2. Type I respiratory failure J96.90 518.81   3. Leukocytosis, unspecified type D72.829 288.60   4. Hypokalemia E87.6 276.8   5. Acute renal failure, unspecified acute renal failure type N17.9 584.9   6. Dehydration E86.0 276.51   7. Elevated troponin R74.8 790.6   8. Altered mental status, unspecified altered mental status type R41.82 780.97   9. Chronic hepatitis C without hepatic coma B18.2 070.54   10. Uremia N19 586   11. Anemia, unspecified type D64.9 285.9   12. Thrombocytopenia D69.6 287.5   13. Impaired functional mobility, balance, gait, and endurance Z74.09 V49.89   14. Decreased activities of daily living (ADL) Z78.9 V49.89     Patient Active Problem List   Diagnosis   • Decompensated COPD with exacerbation (chronic obstructive pulmonary disease)   • Chronic pain   • Acute on chronic respiratory failure with hypoxia and hypercapnia   • Pneumonia of both lungs due to infectious organism   • Atherosclerosis   • Acute renal failure (ARF)   • Hypokalemia   • Cachexia   • Anemia   • GI bleeding     Past Medical History:   Diagnosis Date   • Atherosclerosis    • Chronic obstructive lung disease    • Chronic pain    • Drug dependence    • Hypertension      Past Surgical History:   Procedure Laterality Date   • DIAPHRAGMATIC HERNIA REPAIR     • HIP SURGERY     • LIVER BIOPSY            OT ASSESSMENT FLOWSHEET (last 72 hours)      OT Evaluation       17 1329 17 1315 17 1121 17 1042 17 1040    Rehab Evaluation    Document Type evaluation  -MN   evaluation   See MAR  -ND      Subjective Information   unable to respond  -ND      Evaluation Not Performed  other (see comments)   Swallow evaluation started and pt presentd with s/s of aspiration on two ice chips and once sip of water. Discontinued and notified nursing of pt not alert and safe for po  intake.    -EK       Patient Effort, Rehab Treatment fair  -MN  poor  -ND      Symptoms Noted Comment   Entered room 1329  -ND      General Information    Patient Profile Review yes  -MN  yes  -ND      Onset of Illness/Injury or Date of Surgery Date 10/26/17  -MN  10/26/17  -ND      Referring Physician Dr. Chambers  -MN  Dr. Chambers  -ND      General Observations Supine HOB up +tele +sanchez +left groin temp HD catheter +2L O2  -MN  fowlers, wedge R side, IV, sanchez, dialysis cath L groin  -ND      Pertinent History Of Current Problem Pt admitted with AMS at home. Intubated 10/26 and he self extubated 11/1. He was dx with bilateral PNE, resp failure, COPD exacerbation, ALLAN requiring HD catheter placed 10/30, GI bleed, mitral valce prolapse and had episode of bradycardia 10/30. He then developed left sided weakness and dx with right BG ischemic infarct  -MN  Pt. admitted d/t being found unresponsive with decreased O2. H&H 8.8/25.7 XR chest, CT head- large metallic plate along R parietal skull. Dx: pneumonia with sepsis. Has been on vent but self extubated, respiratory failure, ARF, hypokalemia, cachaxia, anemia, GI bleeding, chronic hep C  -ND      Precautions/Limitations fall precautions;oxygen therapy device and L/min;other (see comments)   no sit at 90* for long periods of time and left hip flex <90  -MN  fall precautions   L side flaccid  -ND      Prior Level of Function --   no family present and pt unable verbalize  -MN  --   unable to obtain   -ND      Equipment Currently Used at Home   --   unable to obtain  -ND cane, straight  -CM     Plans/Goals Discussed With   patient;agreed upon  -ND      Risks Reviewed    patient:;LOB;nausea/vomiting;dizziness;increased discomfort;change in vital signs;increased drainage;lines disloged  -ND      Benefits Reviewed patient:;improve function;increase independence;increase strength;increase balance;decrease pain;decrease risk of DVT;improve skin integrity;increase knowledge  -MN  patient:;improve function;increase independence;increase strength;increase balance;decrease pain;decrease risk of DVT;improve skin integrity;increase knowledge  -ND      Barriers to Rehab medically complex  -MN  medically complex;previous functional deficit;cognitive status;visual deficit;physical barrier;impaired sensation  -ND      Living Environment    Lives With friend(s)  -MN   friend(s)  -CM     Living Arrangements mobile home  -MN   mobile home  -CM     Home Accessibility stairs to enter home  -MN        Number of Stairs to Enter Home 3  -MN        Stair Railings at Home outside, present on right side  -MN        Transportation Available    family or friend will provide  -CM     Living Environment Comment information obtained from CM note  -MN  unable to obtain d/t pt. speech being uncomprehendible and garbled and decreased cognition  -ND      Clinical Impression    Date of Referral to OT   11/02/17  -ND      OT Diagnosis   DECREASED ADL  -ND      Prognosis   poor/fair  -ND      Impairments Found (describe specific impairments)   aerobic capacity/endurance;arousal, attention, and cognition;ergonomics and body mechanics;gait, locomotion, and balance;integumentary integrity;joint integrity and mobility;motor function;muscle performance;posture;ROM;reflex Integrity;sensory Integrity  -ND      Patient/Family Goals Statement   pt. unable to state goal  -ND      Criteria for Skilled Therapeutic Interventions Met   yes;treatment indicated  -ND      Rehab Potential   good, to achieve stated therapy goals  -ND      Therapy Frequency   3-5 times/wk  -ND      Predicted Duration of Therapy Intervention (days/wks)    until dc  -ND      Anticipated Discharge Disposition   skilled nursing facility;long term acute care facility  -ND      Functional Level Prior    Ambulation   4-->completely dependent  -ND  1-->assistive equipment  -CM    Transferring   4-->completely dependent  -ND  0-->independent  -CM    Toileting   4-->completely dependent  -ND  0-->independent  -CM    Bathing   4-->completely dependent  -ND  0-->independent  -CM    Dressing   4-->completely dependent  -ND  0-->independent  -CM    Eating   4-->completely dependent  -ND  0-->independent  -CM    Communication     0-->understands/communicates without difficulty  -CM    Swallowing     0-->swallows foods/liquids without difficulty  -CM    Prior Functional Level Comment     According to daughter, pt was independent prior to hospitalization.  -CM    Vital Signs    Pre Systolic BP Rehab 150  -MN  150  -ND      Pre Treatment Diastolic BP 89  -MN  89  -ND      Post Systolic BP Rehab 138  -MN  138  -ND      Post Treatment Diastolic BP 59  -MN  59  -ND      Pretreatment Heart Rate (beats/min) 71  -MN  71  -ND      Intratreatment Heart Rate (beats/min) 55  -MN  55  -ND      Posttreatment Heart Rate (beats/min) 59  -MN  59  -ND      Pre SpO2 (%) 100  -MN  100  -ND      O2 Delivery Pre Treatment supplemental O2   2L  -MN  supplemental O2   2L O2  -ND      Post SpO2 (%) 100  -MN  100  -ND      O2 Delivery Post Treatment supplemental O2  -MN  supplemental O2   2L O2  -ND      Pre Patient Position Supine  -MN  Supine  -ND      Intra Patient Position Sitting  -MN  Sitting  -ND      Post Patient Position Supine  -MN  Supine  -ND      Pain Assessment    Pain Assessment FLACC  -MN  FLACC  -ND      FLACC (Face, Legs, Activity, Crying, Consolability)    Pain Rating: FLACC (Rest) - Face 0  -MN  0  -ND      Pain Rating: FLACC (Rest) - Legs 1  -MN  1  -ND      Pain Rating: FLACC (Rest) - Activity 0  -MN  0  -ND      Pain Rating: FLACC (Rest) - Cry 0  -MN  0  -ND      Pain Rating: FLACC  (Rest) - Consolability 0  -MN  0  -ND      Score: FLACC (Rest) 1  -MN  1  -ND      Pain Rating: FLACC (Activity) - Face 0  -MN  0  -ND      Pain Rating: FLACC (Activity) - Legs 0  -MN  0  -ND      Pain Rating: FLACC (Activity) 0  -MN  0  -ND      Pain Rating: FLACC (Activity) - Cry 1  -MN  1  -ND      Pain Rating: FLACC (Activity) - Consolability 0  -MN  0  -ND      Score: FLACC (Activity) 1  -MN  1  -ND      Vision Assessment/Intervention    Visual Impairment decreased tracking across midline;decreased visual tracking;inattention to the left  -MN  decreased visual tracking;inattention to the left;ptosis  -ND      Visual Impairment Comment   Pt. had gaze preference to R side and did not respond to startle reflex on L side. Pt. would not track to L side with max verbal and tactile cues.   -ND      Visual Scanning   severe impairment, scan to left  -ND      Cognitive Assessment/Intervention    Current Cognitive/Communication Assessment impaired  -MN  impaired  -ND      Orientation Status oriented to;person  -MN  oriented to;person;disoriented to;place;time;situation  -ND      Follows Commands/Answers Questions 25% of the time;able to follow single-step instructions  -MN  25% of the time;able to follow single-step instructions;needs cueing;needs increased time;needs repetition  -ND      Personal Safety severe impairment;decreased awareness, need for safety;decreased awareness, need for assist  -MN  severe impairment;decreased awareness, need for safety;decreased awareness, need for assist;decreased insight to deficits  -ND      Personal Safety Interventions   fall prevention program maintained;muscle strengthening facilitated;nonskid shoes/slippers when out of bed;supervised activity  -ND      ROM (Range of Motion)    General ROM no range of motion deficits identified  -MN  upper extremity range of motion deficits identified  -ND      General ROM Detail   R shoulder impaired approx 50% pt. very resistive unable to  assess full ROM, R elbow, wrist and digits impaired approx 25%. % impaired  -ND      MMT (Manual Muscle Testing)    General MMT Assessment lower extremity strength deficits identified  -MN        General MMT Assessment Detail   RUE 3-/5 functionally, LUE 0/55  -ND      Muscle Tone Assessment    Muscle Tone Assessment Left-Side Extremities;LUE;LLE  -MN  Left-Side Extremities  -ND      Left-Side Extremities Muscle Tone Assessment flaccid  -MN        LUE Muscle Tone Assessment   flaccid  -ND      Bed Mobility, Assessment/Treatment    Bed Mobility, Assistive Device   head of bed elevated  -ND      Bed Mobility, Scoot/Bridge, [place]   verbal cues required;nonverbal cues required (demo/gesture);dependent (less than 25% patient effort);2 person assist required  -ND      Bed Mob, Supine to Sit, [place] dependent (less than 25% patient effort);2 person assist required  -MN  verbal cues required;nonverbal cues required (demo/gesture);dependent (less than 25% patient effort);2 person assist required  -ND      Bed Mob, Sit to Supine, [place] dependent (less than 25% patient effort);2 person assist required  -MN  verbal cues required;nonverbal cues required (demo/gesture);dependent (less than 25% patient effort);2 person assist required  -ND      Bed Mobility, Safety Issues   decreased use of legs for bridging/pushing;impaired trunk control for bed mobility;decreased use of arms for pushing/pulling;cognitive deficits limit understanding  -ND      Bed Mobility, Impairments   ROM decreased;sensation decreased;strength decreased;impaired balance;postural control impaired  -ND      Bed Mobility, Comment 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 Pt would not turn head or eyes to left but was able to attend to his own left hand at midline.   -MN  Pt. sat EOB approx 12 min requiring Dep/Depx2 for sitting balance with constant verbal and  tactile cues to remain in upright position. Pt. demonstrated severe lean to L side and flexed head position  -ND      Transfer Assessment/Treatment    Transfer, Comment   Deferred not safe at this time  -ND      Lower Body Dressing Assessment/Training    LB Dressing Assess/Train, Clothing Type   doffing:;donning:;socks  -ND      LB Dressing Assess/Train, Position   edge of bed  -ND      LB Dressing Assess/Train, [place]   dependent (less than 25% patient effort)  -ND      LB Dressing Assess/Train, Impairments   ROM decreased;postural control impaired;strength decreased;impaired balance  -ND      Motor Skills/Interventions    Additional Documentation   Balance Skills Training (Group);Fine Motor Coordination Training (Group);Gross Motor Coordination Training (Group)  -ND      Balance Skills Training    Sitting-Level of Assistance Dependent;x2  -MN  Dependent;x2  -ND      Sitting-Balance Support Right upper extremity supported;Feet unsupported  -MN  Right upper extremity supported;Feet supported  -ND      Sitting-Balance Activities Right UE Weight Bearing;Lateral lean;Forward lean  -MN  Right UE Weight Bearing;Forward lean;Lateral lean  -ND      Sitting # of Minutes 12  -MN        Gross Motor Coordination Training    Gross Motor Skill, Impairments Detail   Formal GM testing not completed d/t pt. being flaccid on LUE and decreased cognition limited accuracy of RUE  -ND      Fine Motor Coordination Training    Opposition   Right:;Left:;impaired  -ND      Sensory Assessment/Intervention    Light Touch --  -MN  LUE;RUE  -ND      LUE Light Touch   absent sensation  -ND      RUE Light Touch   mild impairment  -ND      Deep Pressure --  -MN  LUE;RUE  -ND      LUE Deep Pressure   severe impairment   Did not withdrawl from depp stimulus  -ND      RUE Deep Pressure   --   withdrew from painful stimulus  -ND      Pain Sensation LLE;RLE  -MN        LLE Pain Sensation absent sensation  -MN        RLE Pain Sensation WNL    localizes to noxious stimuli  -MN        Edema Management    Edema Amount left:;moderate   UE and LE  -MN  right:;left:   BLE and BUE  -ND      General Therapy Interventions    Planned Therapy Interventions   activity intolerance;balance training;bed mobility training;energy conservation;home exercise program;motor coordination training;neuromuscular re-education;strengthening;visual retraining;stretching;transfer training  -ND      Positioning and Restraints    Pre-Treatment Position in bed  -MN  in bed  -ND      Post Treatment Position bed  -MN  bed  -ND      In Bed side lying right;notified nsg;patient within staff view  -MN  side lying right;call light within reach;encouraged to call for assist;patient within staff view;side rails up x2;SCD pump applied;LUE elevated;RUE elevated  -ND      Lower Extremity    Lower Ext Manual Muscle Testing Detail RLE: hip flex 2+/5, knee ext 3-/5, knee flex 2/5, DF 2-/5, PF 2-/5 LLE: 0/5 grossly  -MN          11/02/17 1039 11/02/17 0800 11/02/17 0404 11/02/17 0008 11/01/17 2008    Functional Level Prior    Ambulation 1-->assistive equipment  -CM        Transferring 0-->independent  -CM        Toileting 0-->independent  -CM        Bathing 0-->independent  -CM        Dressing 0-->independent  -CM        Eating 0-->independent  -CM        Communication 0-->understands/communicates without difficulty  -CM        Swallowing 0-->swallows foods/liquids without difficulty  -CM        Prior Functional Level Comment According to daughter pt was independent prior to hospitalization.  -CM        Muscle Tone Assessment    Muscle Tone Assessment  LUE;RUE;LLE;RLE  -SR Bilateral Upper Extremities;Bilateral Lower Extremities  -DS Bilateral Upper Extremities;Bilateral Lower Extremities  -DS Bilateral Upper Extremities;Bilateral Lower Extremities  -DS    LUE Muscle Tone Assessment  flaccid  -SR flaccid  -DS flaccid  -DS flaccid  -DS    RUE Muscle Tone Assessment  mildly decreased tone  -SR mildly  decreased tone  -DS mildly decreased tone  -DS mildly decreased tone  -DS    LLE Muscle Tone Assessment  severely decreased tone   pt. able to move foot but unable to raise leg  -SR associated movements noted   Moves foot  -DS associated movements noted   Moves foot  -DS associated movements noted   Moves foot  -DS    RLE Muscle Tone Assessment  mildly decreased tone  -SR mildly increased tone  -DS mildly increased tone  -DS mildly increased tone  -DS      11/01/17 1600 11/01/17 1535 11/01/17 1215 11/01/17 0815 11/01/17 0400    Rehab Evaluation    Evaluation Not Performed  patient unavailable for evaluation  -EC       Muscle Tone Assessment    Muscle Tone Assessment Bilateral Upper Extremities;Bilateral Lower Extremities  -DT  Bilateral Upper Extremities;Bilateral Lower Extremities  -DT Bilateral Upper Extremities;Bilateral Lower Extremities  -DT Bilateral Upper Extremities;Bilateral Lower Extremities  -DS    LUE Muscle Tone Assessment flaccid  -DT  flaccid  -DT flaccid  -DT     RUE Muscle Tone Assessment mildly decreased tone  -DT  mildly decreased tone  -DT mildly decreased tone  -DT     Bilateral Upper Extremities Muscle Tone Assessment     mildly decreased tone  -DS    LLE Muscle Tone Assessment associated movements noted   Moves foot  -DT  associated movements noted   Moves foot  -DT associated movements noted   Moves foot  -DT     RLE Muscle Tone Assessment mildly increased tone  -DT  mildly increased tone  -DT mildly increased tone  -DT     Bilateral Lower Extremities Muscle Tone Assessment     mildly decreased tone  -DS      11/01/17 0000 10/31/17 2008 10/31/17 1600 10/31/17 1200 10/31/17 0700    Muscle Tone Assessment    Muscle Tone Assessment Bilateral Upper Extremities;Bilateral Lower Extremities  -DS Bilateral Upper Extremities;Bilateral Lower Extremities  -DS Bilateral Upper Extremities;Bilateral Lower Extremities  -MM Bilateral Upper Extremities;Bilateral Lower Extremities  -MM Bilateral Upper  Extremities;Bilateral Lower Extremities  -MM    Bilateral Upper Extremities Muscle Tone Assessment mildly decreased tone  -DS mildly decreased tone  -DS mildly decreased tone  -MM mildly decreased tone  -MM mildly decreased tone  -MM    Bilateral Lower Extremities Muscle Tone Assessment mildly decreased tone  -DS mildly decreased tone  -DS mildly decreased tone  -MM mildly decreased tone  -MM mildly decreased tone  -MM      10/31/17 0330 10/30/17 2330 10/30/17 2133 10/30/17 2000 10/30/17 1600    Muscle Tone Assessment    Muscle Tone Assessment LUE;RUE;LLE;RLE  -MS LUE;RUE;LLE;RLE  -MS --  -MS LUE;RUE;LLE;RLE  -MS Bilateral Upper Extremities;Bilateral Lower Extremities  -MM    LUE Muscle Tone Assessment flaccid   movement only noted from the shoulder  -MS flaccid   movement only noted from the shoulder  -MS --  -MS flaccid   movement only noted from the shoulder  -MS     RUE Muscle Tone Assessment mildly decreased tone  -MS mildly decreased tone  -MS --  -MS mildly decreased tone  -MS     Bilateral Upper Extremities Muscle Tone Assessment    --  -MS mildly decreased tone  -MM    LLE Muscle Tone Assessment flaccid   slight response on foot to cap nailbed pressure/no leg mvmt  -MS flaccid   slight response on foot to cap nailbed pressure/no leg mvmt  -MS --  -MS flaccid   slight response on foot to cap nailbed pressure/no leg mvmt  -MS     RLE Muscle Tone Assessment mildly decreased tone  -MS mildly decreased tone  -MS --  -MS mildly decreased tone  -MS     Bilateral Lower Extremities Muscle Tone Assessment    --  -MS mildly decreased tone  -MM      User Key  (r) = Recorded By, (t) = Taken By, (c) = Cosigned By    Initials Name Effective Dates    EC Corinne Styles, CCC-SLP 12/30/16 -     EK Corinne Gonzalez, [facility] 04/06/17 -     MN Norah Garcia, PT 10/17/16 -     MM Ashley Carranza, PARMJIT 10/17/16 -     DS Mitra Medina, PARMJIT 10/17/16 -     DT Chino Salcido RN 10/17/16 -     SR Susan Su RN  10/17/16 -     MS Darlin Melendez, PARMJIT 10/17/16 -     CM LETITIA Keane 01/04/17 -     ND Samantha Rocha, OTR/L 10/21/16 -            Occupational Therapy Education     Title: PT OT SLP Therapies (Active)     Topic: Occupational Therapy (Active)     Point: ADL training (Active)    Description: Instruct learner(s) on proper safety adaptation and remediation techniques during self care or transfers.   Instruct in proper use of assistive devices.    Learning Progress Summary    Learner Readiness Method Response Comment Documented by Status   Patient Acceptance E NR Pt. educated on role of OT, safe bed mobility, proper positioning, benefit of activity, progression with poc ND 11/02/17 1433 Active               Point: Home exercise program (Active)    Description: Instruct learner(s) on appropriate technique for monitoring, assisting and/or progressing therapeutic exercises/activities.    Learning Progress Summary    Learner Readiness Method Response Comment Documented by Status   Patient Acceptance E NR Pt. educated on role of OT, safe bed mobility, proper positioning, benefit of activity, progression with poc ND 11/02/17 1433 Active               Point: Body mechanics (Active)    Description: Instruct learner(s) on proper positioning and spine alignment during self-care, functional mobility activities and/or exercises.    Learning Progress Summary    Learner Readiness Method Response Comment Documented by Status   Patient Acceptance E NR Pt. educated on role of OT, safe bed mobility, proper positioning, benefit of activity, progression with poc ND 11/02/17 1433 Active                      User Key     Initials Effective Dates Name Provider Type Discipline    ND 10/21/16 -  Samantha Rocha, OTR/L Occupational Therapist OT                  OT Recommendation and Plan  Anticipated Discharge Disposition: skilled nursing facility, long term acute care facility  Planned Therapy Interventions: activity intolerance,  balance training, bed mobility training, energy conservation, home exercise program, motor coordination training, neuromuscular re-education, strengthening, visual retraining, stretching, transfer training  Therapy Frequency: 3-5 times/wk  Plan of Care Review  Plan Of Care Reviewed With: patient  Progress: progress towards functional goals is fair  Outcome Summary/Follow up Plan: OT eval completed. Pt. demonstrates significant L side deficits. Pt. depx2 to complete bed mobility and sitting balance with significant leaning. Pt. demonstrates L neglect. Pt. cont to benefit from skilled OT d/t decreased balanced, decreased vision, decreased strength, decreased ind in ADLs. Anticipated dc is SNF vs LTACH. 11/2/17 1440          OT Goals       11/02/17 1437          Bed Mobility OT LTG    Bed Mobility OT LTG, Date Established 11/02/17  -ND      Bed Mobility OT LTG, Time to Achieve by discharge  -ND      Bed Mobility OT LTG, Activity Type all bed mobility  -ND      Bed Mobility OT LTG, [place] Level moderate assist (50% patient effort)  -ND      Strength OT LTG    Strength Goal OT LTG, Date Established 11/02/17  -ND      Strength Goal OT LTG, Time to Achieve by discharge  -ND      Strength Goal OT LTG, Measure to Achieve Pt. will complete BUE strengthening exercises all planes and joints to increase BUE strength for ADLs.   -ND      Static Sitting Balance OT LTG    Static Sitting Balance OT LTG, Date Established 11/02/17  -ND      Static Sitting Balance OT LTG, Time to Achieve by discharge  -ND      Static Sitting Balance OT LTG, [place] Level minimum assist (75% patient effort)  -ND      Static Sitting Balance OT LTG, Assist Device UE Support  -ND      Grooming OT LTG    Grooming Goal OT LTG, Date Established 11/02/17  -ND      Grooming Goal OT LTG, Time to Achieve by discharge  -ND      Grooming Goal OT LTG, [place] Level moderate assist (50% patient effort)  -ND      Grooming Goal OT LTG, Position sitting,  edge of bed;sitting in chair  -ND        User Key  (r) = Recorded By, (t) = Taken By, (c) = Cosigned By    Initials Name Provider Type    ND Samantha Rocha OTR/L Occupational Therapist                Outcome Measures       11/02/17 1400 11/02/17 1329       How much help from another person do you currently need...    Turning from your back to your side while in flat bed without using bedrails?  1  -MN     Moving from lying on back to sitting on the side of a flat bed without bedrails?  1  -MN     Moving to and from a bed to a chair (including a wheelchair)?  1  -MN     Standing up from a chair using your arms (e.g., wheelchair, bedside chair)?  1  -MN     Climbing 3-5 steps with a railing?  1  -MN     To walk in hospital room?  1  -MN     AM-PAC 6 Clicks Score  6  -MN     How much help from another is currently needed...    Putting on and taking off regular lower body clothing? 1  -ND      Bathing (including washing, rinsing, and drying) 1  -ND      Toileting (which includes using toilet bed pan or urinal) 1  -ND      Putting on and taking off regular upper body clothing 1  -ND      Taking care of personal grooming (such as brushing teeth) 1  -ND      Eating meals 1  -ND      Score 6  -ND      Functional Assessment    Outcome Measure Options AM-PAC 6 Clicks Daily Activity (OT)  -ND AM-PAC 6 Clicks Basic Mobility (PT)  -MN       User Key  (r) = Recorded By, (t) = Taken By, (c) = Cosigned By    Initials Name Provider Type    MN Norah Garcia, PT Physical Therapist    ND Samantha Rocha OTR/L Occupational Therapist          Time Calculation:   OT Start Time: 1329  OT Stop Time: 1410  OT Time Calculation (min): 41 min    Therapy Charges for Today     Code Description Service Date Service Provider Modifiers Qty    91047216340 HC OT SELFCARE CURRENT 11/2/2017 THEO Abbasi/L GO, CN 1    31951923912 HC OT SELFCARE PROJECTED 11/2/2017 Samantha Rocha OTR/L GO, CM 1    69295244718 HC OT EVAL HIGH COMPLEXITY  3 11/2/2017 Samantha Rocha, OTR/L DOLORES AMAYA 1          OT G-codes  OT Functional Scales Options: AM-PAC 6 Clicks Daily Activity (OT)  Functional Limitation: Self care  Self Care Current Status (): 100 percent impaired, limited or restricted  Self Care Goal Status (): At least 80 percent but less than 100 percent impaired, limited or restricted    Samantha Rocha, JACOBR/L  11/2/2017

## 2017-11-02 NOTE — PROGRESS NOTES
"Intensive Care Follow-up      LOS: 6 days     Mr. Héctor Koo, 62 y.o. male is followed for: Pneumonia of both lungs due to infectious organism   CHIEF COMPLIANTShortness of breath    Subjective - Interval History     This gentleman presented with pneumonia was noted to have biapical pulmonary infiltrates.  After he self extubated it was obvious he had severe left-sided weakness.  Breathing is doing well on present medications    The patient's relevant past medical, surgical and social history were reviewed and updated in Epic as appropriate.     Objective   BP (!) 186/96  Pulse 50  Temp 97.6 °F (36.4 °C) (Oral)   Resp 16  Ht 67\" (170.2 cm)  Wt 132 lb 4.8 oz (60 kg)  SpO2 100%  BMI 20.72 kg/m2      Vent Settings:      Physical Exam:Not moving left side arousable    General Appearance:       Head:    Normocephalic, without obvious abnormality, atraumatic   Eyes:            Lids and lashes normal, conjunctivae and sclerae normal, no   icterus, no pallor, corneas clear, PERRLA   Ears:    Ears appear intact with no abnormalities noted   Throat:   No oral lesions, no thrush, oral mucosa moist   Neck:   No adenopathy, supple, trachea midline, no thyromegaly, no   carotid bruit, no JVD   Back:     No kyphosis present, no scoliosis present, no skin lesions,      erythema or scars, no tenderness to percussion or                   palpation,   range of motion normal   Lungs:   Scattered rhonchi and wheezes     Heart:    Regular rhythm and normal rate, normal S1 and S2, no            murmur, no gallop, no rub, no click   Chest Wall:    No abnormalities observed   Abdomen:     Normal bowel sounds, no masses, no organomegaly, soft        non-tender, non-distended, no guarding, no rebound                tenderness   Rectal:     Deferred   Extremities:   Moves all extremities well, no edema, no cyanosis, no             redness   Pulses:   Pulses palpable and equal bilaterally   Skin:   No bleeding, bruising or rash "   Lymph nodes:   No palpable adenopathy   Neurologic:   Cranial nerves 2 - 12 grossly intact, sensation intact, DTR       present and equal bilaterally     LAB:    pH, Arterial   Date Value Ref Range Status   10/31/2017 7.437 7.350 - 7.450 pH units Final     pCO2, Arterial   Date Value Ref Range Status   10/31/2017 37.7 35.0 - 45.0 mm Hg Final     pO2, Arterial   Date Value Ref Range Status   10/31/2017 68.3 (L) 80.0 - 105.0 mm Hg Final     Lab Results   Component Value Date    WBC 13.13 (H) 11/02/2017    HGB 8.8 (L) 11/02/2017    HCT 25.7 (L) 11/02/2017    MCV 79.6 (L) 11/02/2017     (L) 11/02/2017     Lab Results   Component Value Date    GLUCOSE 144 (H) 11/01/2017    BUN 39 (H) 11/01/2017    CREATININE 2.65 (H) 11/01/2017    EGFRIFNONA 25 (L) 11/01/2017    BCR 14.7 11/01/2017    CO2 24.0 11/01/2017    CALCIUM 7.6 (L) 11/01/2017    ALBUMIN 2.40 (L) 11/01/2017    LABIL2 0.7 (L) 11/01/2017    AST 35 11/01/2017    ALT 25 11/01/2017         RAD:   Imaging Results (last 24 hours)     Procedure Component Value Units Date/Time    XR Chest 1 View [[id number]] Collected:  11/01/17 0531     Updated:  11/01/17 1030    Narrative:       [facility]    Patient Name: ORAL JOSSIE UDAY    ORDERING: DORA SAUNDERS     ATTENDING: BRISSA MERINO     REFERRING: DORA SAUNDERS    -----------------------    PROCEDURE: Portable chest x-ray    TECHNIQUE: Single AP view of the chest    COMPARISON: 10/31/2017    HISTORY: intubated, J18.9 Pneumonia, unspecified organism J96.90  Respiratory failure, unspecified, unspecified whether with  hypoxia or hypercapnia D72.829 Elevated white blood cell count,  unspecified E87.6 Hypokalemia N17.9 Acute kidney failure,  unspecified E86.0 Dehydration R74.8 Abnormal levels of other  serum enzymes R41.82 Altered mental status, unspecified B18.2  Chronic viral hepatitis C N19 unspecified    FINDINGS:     Life-support devices: Endotracheal and enteric tubes have  been  removed.    Lungs/pleura: Coarse interstitial markings are seen diffusely,  likely due to chronic lung disease. Biapical opacities are  present without significant change, probably due to scarring.    Heart, hilar and mediastinal structures: Stable accounting for  differences in projection and technique.      Impression:       CONCLUSION:  No acute pulmonary disease.    Electronically signed by:  Edwin Souza MD  11/1/2017 10:23 AM  CDT Workstation: [id number]    CT Head Without Contrast [[id number]] Collected:  11/01/17 1812     Updated:  11/01/17 1900    Narrative:         CT Head Without Contrast    History: Left-sided weakness    Axial scans of the brain were obtained without intravenous  contrast.  Coronal and sagital reconstructions were preformed.    This exam was performed according to our departmental  dose-optimization program, which includes automated exposure  control, adjustment of the mA and/or kV according to patient size  and/or use of iterative reconstruction technique.    DLP: 964.80    Comparison: October 30, 2017.    Bone windows are unremarkable.  The visualized paranasal sinuses are unremarkable.  Endotracheal tube and nasogastric tube have been removed.    Again images significantly limited by streak artifact from metal  plate covering right occipital parietal craniotomy defect  No hemorrhage.  No mass.  Again old left thalamic lacunar infarct and stable area of  ischemia right basal ganglia.  No abnormal areas of increased attenuation.  No midline shift.  No abnormal extra-axial fluid collections.      Impression:       CONCLUSION:  No change    12666    Electronically signed by:  Jackson Melendrez MD  11/1/2017 6:59 PM CDT  Workstation: [id number]    XR Chest 1 View [[id number]] Collected:  11/02/17 0520     Updated:  11/02/17 0703    Narrative:       Exam: AP portable chest    INDICATION: Pneumonia    COMPARISON: 11/1/2017 at 5:15 AM    FINDINGS: AP portable chest. Heart size normal.  Biapical pleural  thickening. Coarse interstitial markings present presumably due  to chronic interstitial lung disease. Acute infiltrate is not  excluded. There may be a small left pleural effusion.      Impression:       No significant change.    Electronically signed by:  Troy Metcalf MD  11/2/2017 7:02 AM  CDT Workstation: [id number]     * (Principal)Pneumonia of both lungs due to infectious organism    Decompensated COPD with exacerbation (chronic obstructive pulmonary disease) (Chronic)    Chronic pain (Chronic)    Acute on chronic respiratory failure with hypoxia and hypercapnia (Chronic)    Acute renal failure (ARF)    Hypokalemia    Cachexia (Chronic)    Anemia (Chronic)    GI bleeding               Plan        Last chest x-ray continued to reveal increased markings in biapical densities    Impression pneumonia and improving, left-sided weakness    Recommendations continue present medications, follow O2 sat and chest x-ray    I discussed the patient's findings and my recommendations with patient and nursing staff

## 2017-11-02 NOTE — PROGRESS NOTES
Discharge Planning Assessment  [facility]     Patient Name: Héctor Koo  MRN: [id number]  Today's Date: 11/2/2017    Admit Date: 10/26/2017          Discharge Needs Assessment       11/02/17 1042    Living Environment    Lives With friend(s)    Living Arrangements mobile home    Transportation Available family or friend will provide    Living Environment    Provides Primary Care For no one    Quality Of Family Relationships supportive;helpful    Living Arrangement Comments According to daughter pt resided at home with a friend. Daughter stated that she lives close and offers assistance as needed.    Discharge Needs Assessment    Concerns To Be Addressed adjustment to diagnosis/illness concerns;discharge planning concerns;compliance issue concerns    Concerns Comments Positive UDS    Equipment Currently Used at Home cane, straight    Discharge Facility/Level Of Care Needs LTACH (long term acute care hospital)    Current Discharge Risk chronically ill;physical impairment;substance abuse    Discharge Disposition still a patient            Discharge Plan       11/02/17 1050    Case Management/Social Work Plan    Plan LTAC    Patient/Family In Agreement With Plan yes    Additional Comments LSW assesment complete. pt resides at home with a friend. pt appears to have family support. During morning rounds LSW discussed with MD LTAC referral for long term antibiotics and PT/OT. MD agreeable. LSW contacted pt's daughter and provided options. Daughter preferred [facility]. LSW contacted Jeff and informed him of referral. Pt to be evaluated later today, awaiting results of evaluation.        Discharge Placement     No information found        Expected Discharge Date and Time     Expected Discharge Date Expected Discharge Time    Nov 3, 2017               Demographic Summary       11/02/17 1039    Referral Information    Admission Type inpatient    Referral Source high risk screening    Reason For  Consult discharge planning    Record Reviewed medical record    Contact Information    Permission Granted to Share Information With     Primary Care Physician Information    Name Corina CHEN      11/02/17 1038    Referral Information    Admission Type inpatient    Referral Source high risk screening    Reason For Consult discharge planning    Record Reviewed medical record    Contact Information    Permission Granted to Share Information With             Functional Status       11/02/17 1040    Functional Status Prior    Ambulation 1-->assistive equipment    Transferring 0-->independent    Toileting 0-->independent    Bathing 0-->independent    Dressing 0-->independent    Eating 0-->independent    Communication 0-->understands/communicates without difficulty    Swallowing 0-->swallows foods/liquids without difficulty    Prior Functional Level Comment According to daughter, pt was independent prior to hospitalization.    IADL    Medications independent    Meal Preparation independent    Housekeeping independent    Laundry independent    Shopping independent    Oral Care independent    Activity Tolerance    Current Activity Limitations weakness severe, generalized    Usual Activity Tolerance moderate    Current Activity Tolerance poor    Cognitive/Perceptual/Developmental    Current Mental Status/Cognitive Functioning unable to assess    Recent Changes in Mental Status/Cognitive Functioning mental status    Developmental Stage (Eriksson's Stages of Development) Stage 7 (35-65 years/Middle Adulthood) Generativity vs. Stagnation      11/02/17 1039    Functional Status Prior    Ambulation 1-->assistive equipment    Transferring 0-->independent    Toileting 0-->independent    Bathing 0-->independent    Dressing 0-->independent    Eating 0-->independent    Communication 0-->understands/communicates without difficulty    Swallowing 0-->swallows foods/liquids without difficulty    Prior  Functional Level Comment According to daughter pt was independent prior to hospitalization.            Psychosocial     None            Abuse/Neglect     None            Legal     None            Substance Abuse     None            Patient Forms     None          LETITIA Keane

## 2017-11-02 NOTE — PLAN OF CARE
Problem: Patient Care Overview (Adult)  Goal: Plan of Care Review  Outcome: Ongoing (interventions implemented as appropriate)    11/02/17 1437   Outcome Evaluation   Outcome Summary/Follow up Plan OT eval completed. Pt. demonstrates significant L side deficits. Pt. depx2 to complete bed mobility and sitting balance with significant leaning. Pt. demonstrates L neglect. Pt. cont to benefit from skilled OT d/t decreased balanced, decreased vision, decreased strength, decreased ind in ADLs. Anticipated dc is SNF vs LTACH. 11/2/17 1440   Coping/Psychosocial Response Interventions   Plan Of Care Reviewed With patient   Patient Care Overview   Progress progress towards functional goals is fair         Problem: Inpatient Occupational Therapy  Goal: Bed Mobility Goal LTG- OT  Outcome: Ongoing (interventions implemented as appropriate)    11/02/17 1437   Bed Mobility OT LTG   Bed Mobility OT LTG, Date Established 11/02/17   Bed Mobility OT LTG, Time to Achieve by discharge   Bed Mobility OT LTG, Activity Type all bed mobility   Bed Mobility OT LTG, [place] Level moderate assist (50% patient effort)       Goal: Strength Goal LTG- OT  Outcome: Ongoing (interventions implemented as appropriate)    11/02/17 1437   Strength OT LTG   Strength Goal OT LTG, Date Established 11/02/17   Strength Goal OT LTG, Time to Achieve by discharge   Strength Goal OT LTG, Measure to Achieve Pt. will complete BUE strengthening exercises all planes and joints to increase BUE strength for ADLs.        Goal: Static Sitting Balance Goal LTG- OT  Outcome: Ongoing (interventions implemented as appropriate)    11/02/17 1437   Static Sitting Balance OT LTG   Static Sitting Balance OT LTG, Date Established 11/02/17   Static Sitting Balance OT LTG, Time to Achieve by discharge   Static Sitting Balance OT LTG, [place] Level minimum assist (75% patient effort)   Static Sitting Balance OT LTG, Assist Device UE Support       Goal: Grooming Goal LTG-  OT  Outcome: Ongoing (interventions implemented as appropriate)    11/02/17 1437   Grooming OT LTG   Grooming Goal OT LTG, Date Established 11/02/17   Grooming Goal OT LTG, Time to Achieve by discharge   Grooming Goal OT LTG, [place] Level moderate assist (50% patient effort)   Grooming Goal OT LTG, Position sitting, edge of bed;sitting in chair

## 2017-11-02 NOTE — PROGRESS NOTES
[facility] Medicine Services  INPATIENT PROGRESS NOTE    Length of Stay: 6  Date of Admission: 10/26/2017  Primary Care Physician: GUSTAVO Aguilar    Subjective   Chief Complaint:   Chief Complaint   Patient presents with   • Altered Mental Status       HPI  Cultures are so far negative . Failed his swallow evaluation   Left sided  Weakness ,aphasia ,CT head did not show any new stroke , cannot do an MRI due to his metallic plate .   Will or nilson a DOBOHOFF and tube feeds for now  Then reassess his swallowing later  If fails again will consider a Peg tube   Will send to LTACH    Review of Systems   Unable to perform ROS: Acuity of condition        All pertinent negatives and positives are as above. All other systems have been reviewed and are negative unless otherwise stated.     Objective    Temp:  [96.5 °F (35.8 °C)-97.6 °F (36.4 °C)] 96.9 °F (36.1 °C)  Heart Rate:  [44-74] 63  Resp:  [13-20] 18  BP: (102-186)/(56-96) 138/83  Physical Exam   Constitutional: He appears cachectic.   HENT:   Head: Normocephalic and atraumatic.   Cardiovascular: S1 normal, S2 normal and normal heart sounds.  PMI is not displaced.    No murmur heard.  Pulmonary/Chest: He has decreased breath sounds. He has no wheezes. He has no rhonchi. He has no rales.   Abdominal: Normal appearance and bowel sounds are normal. There is no hepatosplenomegaly. There is no tenderness. There is no rebound and no guarding.   Musculoskeletal:        Right shoulder: He exhibits no swelling.   Neurological:   Left sided weakness   Expressive aphasia            Results Review:  I have reviewed the labs, radiology results, and diagnostic studies.    Laboratory Data:   Lab Results (last 24 hours)     Procedure Component Value Units Date/Time    Blood Culture - Blood, [[id number]]  (Normal) Collected:  10/30/17 1807    Specimen:  Blood from Wrist, Right Updated:  11/01/17 1816     Blood Culture No growth at 2  days    CBC & Differential [[id number]] Collected:  11/02/17 0553    Specimen:  Blood Updated:  11/02/17 0615    Narrative:       The following orders were created for panel order CBC & Differential.  Procedure                               Abnormality         Status                     ---------                               -----------         ------                     CBC Auto Differential[[id number]]        Abnormal            Final result                 Please view results for these tests on the individual orders.    CBC Auto Differential [[id number]]  (Abnormal) Collected:  11/02/17 0553    Specimen:  Blood Updated:  11/02/17 0615     WBC 13.13 (H) 10*3/mm3      RBC 3.23 (L) 10*6/mm3      Hemoglobin 8.8 (L) g/dL      Hematocrit 25.7 (L) %      MCV 79.6 (L) fL      MCH 27.2 pg      MCHC 34.2 g/dL      RDW 15.1 (H) %      RDW-SD 44.3 (H) fl      MPV 9.1 fL      Platelets 122 (L) 10*3/mm3      Neutrophil % 90.2 (H) %      Lymphocyte % 4.8 (L) %      Monocyte % 4.0 %      Eosinophil % 0.1 %      Basophil % 0.1 %      Immature Grans % 0.8 (H) %      Neutrophils, Absolute 11.86 (H) 10*3/mm3      Lymphocytes, Absolute 0.63 10*3/mm3      Monocytes, Absolute 0.52 10*3/mm3      Eosinophils, Absolute 0.01 10*3/mm3      Basophils, Absolute 0.01 10*3/mm3      Immature Grans, Absolute 0.10 (H) 10*3/mm3      nRBC 0.0 /100 WBC     Magnesium [[id number]]  (Normal) Collected:  11/02/17 0553    Specimen:  Blood Updated:  11/02/17 0639     Magnesium 1.9 mg/dL       Specimen hemolyzed.  Results may be affected.       Comprehensive Metabolic Panel [[id number]]  (Abnormal) Collected:  11/02/17 0728    Specimen:  Blood Updated:  11/02/17 0759     Glucose 137 (H) mg/dL      BUN 42 (H) mg/dL      Creatinine 3.32 (H) mg/dL      Sodium 142 mmol/L      Potassium 3.4 (L) mmol/L      Chloride 105 mmol/L      CO2 23.0 mmol/L      Calcium 8.0 (L) mg/dL      Total Protein 6.4 g/dL      Albumin 2.70 (L) g/dL      ALT (SGPT) 34 U/L      AST (SGOT)  39 U/L      Alkaline Phosphatase 59 U/L      Total Bilirubin 0.5 mg/dL      eGFR Non African Amer 19 (L) mL/min/1.73      Globulin 3.7 (H) gm/dL      A/G Ratio 0.7 (L) g/dL      BUN/Creatinine Ratio 12.7     Anion Gap 14.0 mmol/L     Blood Culture - Blood, [[id number]]  (Normal) Collected:  10/30/17 1450    Specimen:  Blood from Arm, Left Updated:  11/02/17 1516     Blood Culture No growth at 3 days          Culture Data:   Blood Culture   Date Value Ref Range Status   10/30/2017 No growth at 2 days  Preliminary     No results found for: URINECX  No results found for: RESPCX  No results found for: WOUNDCX  No results found for: STOOLCX  No components found for: BODYFLD    Radiology Data:   Imaging Results (last 24 hours)     Procedure Component Value Units Date/Time    CT Head Without Contrast [[id number]] Collected:  11/01/17 1812     Updated:  11/01/17 1900    Narrative:         CT Head Without Contrast    History: Left-sided weakness    Axial scans of the brain were obtained without intravenous  contrast.  Coronal and sagital reconstructions were preformed.    This exam was performed according to our departmental  dose-optimization program, which includes automated exposure  control, adjustment of the mA and/or kV according to patient size  and/or use of iterative reconstruction technique.    DLP: 964.80    Comparison: October 30, 2017.    Bone windows are unremarkable.  The visualized paranasal sinuses are unremarkable.  Endotracheal tube and nasogastric tube have been removed.    Again images significantly limited by streak artifact from metal  plate covering right occipital parietal craniotomy defect  No hemorrhage.  No mass.  Again old left thalamic lacunar infarct and stable area of  ischemia right basal ganglia.  No abnormal areas of increased attenuation.  No midline shift.  No abnormal extra-axial fluid collections.      Impression:       CONCLUSION:  No change    [id number]    Electronically signed by:  Jackson  Aneesh JONES  11/1/2017 6:59 PM CDT  Workstation: [id number] [email or web address]    XR Chest 1 View [[id number]] Collected:  11/02/17 0520     Updated:  11/02/17 0703    Narrative:       Exam: AP portable chest    INDICATION: Pneumonia    COMPARISON: 11/1/2017 at 5:15 AM    FINDINGS: AP portable chest. Heart size normal. Biapical pleural  thickening. Coarse interstitial markings present presumably due  to chronic interstitial lung disease. Acute infiltrate is not  excluded. There may be a small left pleural effusion.      Impression:       No significant change.    Electronically signed by:  Troy Metcalf MD  11/2/2017 7:02 AM  CDT Workstation: [id number]          I have reviewed the patient current medications.     Assessment/Plan   Principal Problem:    Pneumonia of both lungs due to infectious organism  Active Problems:    Acute on chronic respiratory failure with hypoxia and hypercapnia    Acute renal failure (ARF)    Hypokalemia( resolved)    GI bleeding    Decompensated COPD with exacerbation (chronic obstructive pulmonary disease)    Chronic pain    Cachexia    Anemia  Infective endocarditis   Enterococcus bacteremia    UTI   Hypotension (resolved )     Left sided weakness   Old strokes on CT of brain         Plan:  Patient self extubated  Is on o2 will get a chest xray in am   Continue with pressors . Is now off  Pressors BP is controlled    continue with iv antibiotics for 6 weeks  , repeat blood cultures are negative so far   Ct of the brain done no new findings . Could not do MRI as  He has a metallic plate  It would create too much artefact ( radiologist called)   Nephrology on board   Kidney function better after dialysis    will repeat echocardiogram in a few days  To assess vegetations   Gi evaluated the patient  At one point the patient will have  An EGD ,hiv will be ordered   HB is stable no transfusion for today 11/01HB is 8 today 11/01  Will get swallow evaluation   11/02 :failed swallow evaluation today  Will  get Dobbhoff and tube feeds for now  reassess at a later time  If  Fails again consider a peg tube  For nutrition   Started PT /OT      Katie Chambers MD   11/02/17   3:19 PM

## 2017-11-02 NOTE — PROGRESS NOTES
Cardiology Progress Note:     LOS: 6 days   Patient Care Team:  GUSTAVO Aguilar as PCP - General (Family Medicine)      Subjective:   Chart reviewed , patient seen and examined.  Patient continues to remain stable with a heart rate on telemetry monitor which has been greater than 60.  Patient has not had any further bradycardia.  Patient blood pressure has been labile.  Patient had failed the swallowing test.  Patient's CT of the head did not show off any new cerebrovascular accident.  Patient is unable to undergo an MRI secondary to the metallic plate.  Patient currently has a Dobbhoff tube for feeding.              Objective:     Objective:  Vitals:    11/02/17 1314   BP:    Pulse: 68   Resp: 18   Temp:    SpO2: 99%       Intake/Output Summary (Last 24 hours) at 11/02/17 1353  Last data filed at 11/02/17 1201   Gross per 24 hour   Intake          2129.17 ml   Output             1740 ml   Net           389.17 ml             Physical Exam:   General Appearance:    Patient is arousable but not really responsive with left-sided hemiparesis with expressive aphasia.     Head:    Normocephalic, atraumatic, without obvious abnormality   Eyes:           MARISOL  Lids and lashes normal, conjunctivae and sclerae normal, no icterus, no pallor   Ears:    Ears appear intact with no abnormalities noted   Throat:   Mucous membranes pink and moist   Neck:   Supple, trachea midline, no carotid bruit, no organomegaly or JVD   Lungs:     Clear to auscultation and percussion, respirations regular, even and Unlabored. No wheezes, rales, rhonchi    Heart:    Regular rhythm and normal rate, normal S1 and S2, no            murmur, no gallop, no rub, no click   Abdomen:     Soft, non-tender, non-distended, no guarding, no rebound tenderness, Normal bowel sounds in all four quadrant, no masses, liver and spleen nonpalpable,    Genitalia:    Deferred   Extremities:   Moves all extremities well, no edema, no cyanosis, no               Redness, no clubbing   Pulses:   Pulses palpable and equal bilaterally   Skin:   Moist and warm. No bleeding, bruising or rash   Neurologic/Psychiatric: Patient has expressive aphasia with left-sided hemiparesis.              Results Review:    Lab Results (last 24 hours)     Procedure Component Value Units Date/Time    Blood Culture - Blood, [[id number]]  (Normal) Collected:  10/30/17 1450    Specimen:  Blood from Arm, Left Updated:  11/01/17 1516     Blood Culture No growth at 2 days    Blood Culture - Blood, [[id number]]  (Normal) Collected:  10/30/17 1807    Specimen:  Blood from Wrist, Right Updated:  11/01/17 1816     Blood Culture No growth at 2 days    CBC & Differential [[id number]] Collected:  11/02/17 0553    Specimen:  Blood Updated:  11/02/17 0615    Narrative:       The following orders were created for panel order CBC & Differential.  Procedure                               Abnormality         Status                     ---------                               -----------         ------                     CBC Auto Differential[[id number]]        Abnormal            Final result                 Please view results for these tests on the individual orders.    CBC Auto Differential [[id number]]  (Abnormal) Collected:  11/02/17 0553    Specimen:  Blood Updated:  11/02/17 0615     WBC 13.13 (H) 10*3/mm3      RBC 3.23 (L) 10*6/mm3      Hemoglobin 8.8 (L) g/dL      Hematocrit 25.7 (L) %      MCV 79.6 (L) fL      MCH 27.2 pg      MCHC 34.2 g/dL      RDW 15.1 (H) %      RDW-SD 44.3 (H) fl      MPV 9.1 fL      Platelets 122 (L) 10*3/mm3      Neutrophil % 90.2 (H) %      Lymphocyte % 4.8 (L) %      Monocyte % 4.0 %      Eosinophil % 0.1 %      Basophil % 0.1 %      Immature Grans % 0.8 (H) %      Neutrophils, Absolute 11.86 (H) 10*3/mm3      Lymphocytes, Absolute 0.63 10*3/mm3      Monocytes, Absolute 0.52 10*3/mm3      Eosinophils, Absolute 0.01 10*3/mm3      Basophils, Absolute 0.01 10*3/mm3      Immature Grans,  Absolute 0.10 (H) 10*3/mm3      nRBC 0.0 /100 WBC     Magnesium [[id number]]  (Normal) Collected:  11/02/17 0553    Specimen:  Blood Updated:  11/02/17 0639     Magnesium 1.9 mg/dL       Specimen hemolyzed.  Results may be affected.       Comprehensive Metabolic Panel [[id number]]  (Abnormal) Collected:  11/02/17 0728    Specimen:  Blood Updated:  11/02/17 0759     Glucose 137 (H) mg/dL      BUN 42 (H) mg/dL      Creatinine 3.32 (H) mg/dL      Sodium 142 mmol/L      Potassium 3.4 (L) mmol/L      Chloride 105 mmol/L      CO2 23.0 mmol/L      Calcium 8.0 (L) mg/dL      Total Protein 6.4 g/dL      Albumin 2.70 (L) g/dL      ALT (SGPT) 34 U/L      AST (SGOT) 39 U/L      Alkaline Phosphatase 59 U/L      Total Bilirubin 0.5 mg/dL      eGFR Non African Amer 19 (L) mL/min/1.73      Globulin 3.7 (H) gm/dL      A/G Ratio 0.7 (L) g/dL      BUN/Creatinine Ratio 12.7     Anion Gap 14.0 mmol/L            Medication Review:   Current Facility-Administered Medications   Medication Dose Route Frequency Provider Last Rate Last Dose   • albuterol (PROVENTIL) nebulizer solution 0.083% 2.5 mg/3mL  2.5 mg Nebulization Q6H - RT Juan David Storey MD   2.5 mg at 11/02/17 1314   • ampicillin 2 g/100 mL 0.9% NS (MBP)  2 g Intravenous Q8H Katie Chambers MD   2 g at 11/02/17 1202   • cefTRIAXone (ROCEPHIN) 2 g/100 mL 0.9% NS VTB (ISAEL)  2 g Intravenous Q12H Katie Chambers MD   2 g at 11/02/17 1201   • chlorhexidine (PERIDEX) 0.12 % solution 15 mL  15 mL Mouth/Throat Q12H Jaya Harrison MD   15 mL at 11/02/17 1045   • famotidine (PEPCID) injection 10 mg  10 mg Intravenous BID Jaya Harrison MD   10 mg at 11/02/17 1045   • influenza vac split quad (FLUZONE QUADRIVALENT) IM suspension 0.5 mL  0.5 mL Intramuscular During Hospitalization Jaya Harrison MD       • methylPREDNISolone sodium succinate (SOLU-Medrol) injection 80 mg  80 mg Intravenous Q6H Jaya Harrison MD   80 mg at 11/02/17 1201   • midazolam (VERSED) 50 mg in sodium chloride 0.9 % 100  mL (0.5 mg/mL) infusion  1 mg/hr Intravenous Titrated Jaya Harrison MD   Stopped at 10/31/17 0545   • morphine injection 4 mg  4 mg Intravenous Q1H PRN Jaya Harrison MD   4 mg at 10/30/17 1330   • norepinephrine (LEVOPHED) 8,000 mcg in sodium chloride 0.9 % 250 mL (32 mcg/mL) infusion  0.02-0.3 mcg/kg/min Intravenous Titrated Jaya Harrison MD   Stopped at 10/27/17 0814   • pneumococcal polysaccharide 23-valent (PNEUMOVAX-23) vaccine 0.5 mL  0.5 mL Intramuscular During Hospitalization Jaya Harrison MD       • sodium chloride 0.9 % bolus 1,000 mL  1,000 mL Intravenous PRN James Mcmanus MD       • sodium chloride 0.9 % bolus 100 mL  100 mL Intravenous PRN James Mcmanus MD       • sodium chloride 0.9 % bolus 100 mL  100 mL Intravenous PRN James Mcmanus MD       • sodium chloride 0.9 % bolus 100 mL  100 mL Intravenous PRN James Mcmanus MD       • sodium chloride 0.9 % bolus 500 mL  500 mL Intravenous Once James Mcmanus MD   Stopped at 10/30/17 1100   • sodium chloride 0.9 % flush 1-10 mL  1-10 mL Intravenous PRN Jaya Harrison MD       • sodium chloride 0.9 % infusion  50 mL/hr Intravenous Continuous James Mcmanus MD 50 mL/hr at 11/02/17 0220 50 mL/hr at 11/02/17 0220       Assessment and Plan:    Principal Problem:    Pneumonia of both lungs due to infectious organism  Active Problems:    Decompensated COPD with exacerbation (chronic obstructive pulmonary disease)    Chronic pain    Acute on chronic respiratory failure with hypoxia and hypercapnia    Acute renal failure (ARF)    Hypokalemia    Cachexia    Anemia    GI bleeding  1.  Mitral valve endocarditis.  Patient has not had any recurrence of fever.  Patient would be continued on IV antibiotics and would need at least 6-8 weeks and a repeat transthoracic echocardiogram after that.  Patient is not a candidate for any invasive intervention from the cardiac standpoint.  2.  Bradycardia arrhythmia.  Patient has not had any recurrence of bradycardia.  3.  Labile  blood pressure.  Patient blood pressure has remained stable.  4.  Moderate mitral and moderate tricuspid regurgitation.  Patient would be started on afterload reduction with hydralazine if the patient blood pressure remained elevated along with nitrates if the blood pressure can tolerate.  Patient is unable to take ACE inhibitor secondary to the renal insufficiency.  5.  Renal insufficiency with admission creatinine of 7.2.  Patient is currently followed by nephrology and is on hemodialysis.  6.  Anemia.  Patient hemoglobin has remained stable patient was evaluated by gastroenterology.  7.  Cerebrovascular accident with left-sided hemiparesis patient is being evaluated for long-term acute care.          Mike Alvarado MD  11/02/17  1:53 PM      Time: Time spent on face-to-face interaction 25 minutes    EMR Dragon/Transcription disclaimer:   Much of this encounter note is an electronic transcription/translation of spoken language to printed text. The electronic translation of spoken language may permit erroneous, or at times, nonsensical words or phrases to be inadvertently transcribed; Although I have reviewed the note for such errors, some may still exist.

## 2017-11-02 NOTE — PROGRESS NOTES
TWO PATIENT IDENTIFIERS WERE USED. CONSENT WAS SIGNED PER POA EDUCATION MATERIAL WAS GIVEN TO PATIENT AND / OR FAMILY. THE PATIENT WAS DRAPED WITH FULL BODY DRAPE AND PATIENT'SLEFT   ARM WAS PREPPED WITH CHLORAPREP.  ULTRASOUND WAS USED TO LOCALIZE THELEFT BASILIC VEIN. SUBCUTANEOUS TISSUE AT THE CATHETER SITE WAS INFILTRATED WITH 2% LIDOCAINE. UNDER ULTRASOUND GUIDANCE, THE VEIN WAS ACCESSED WITH A 21GAUGE  NEEDLE. AN 0.018 WIRE WAS THEN THREADED THROUGH THE NEEDLE INTO THE CENTRAL VENOUS SYSTEM. THE 21GAUGE  NEEDLE WAS REMOVED AND A 6 FRENCHPEEL AWAY SHEATH WAS PLACED OVER THE WIRE. THE PICC LINE CATHETER WAS CUT AT 34 CM. THE PICC LINE CATHETER WAS THEN PLACED OVER THE WIRE INTO THE VEIN, THE SHEATH WAS PEELED AWAY,WIRE WAS REMOVED. CATHETER WAS FLUSHED WITH NORMAL SALINE AND TIPS APPLIED. BIOPATCH PLACED. CATHETER SECURED WITHSTATLOCK  AND TEGADERM. PATIENT TOLERATED PROCEDURE WELL. THIS WAS DONE IN    ICU      IMPRESSION: SUCCESSFUL PLACEMENT OF TRIPLE LUMEN SOLO PICC        Julian Cornejo  11/2/2017  3:43 PM

## 2017-11-02 NOTE — PROGRESS NOTES
"[facility]  [address] [place] [address]. [id number]  T - [phone]  F - [phone]     Progress Note          PATIENT  DEMOGRAPHICS   PATIENT NAME: Héctor Koo                      PHYSICIAN: James Mcmanus MD  : 1955  MRN: [id number]   LOS: 6 days    Patient Care Team:  GUSTAVO Aguilar as PCP - General (Family Medicine)  Subjective   SUBJECTIVE   Receiving hd drowsy, UO poor       Objective   OBJECTIVE   Vital Signs  Temp:  [96.5 °F (35.8 °C)-97.8 °F (36.6 °C)] 96.5 °F (35.8 °C)  Heart Rate:  [40-74] 54  Resp:  [10-20] 18  BP: (115-186)/(71-96) 115/74    Flowsheet Rows         First Filed Value    Admission Height  67\" (170.2 cm) Documented at 10/27/2017 0040    Admission Weight  92 lb (41.7 kg) Documented at 10/27/2017 0040           I/O last 3 completed shifts:  In: .2 [I.V.:1629.2; IV Piggyback:400]  Out: 575 [Urine:575]    PHYSICAL EXAM    Physical Exam   Constitutional: He appears well-developed.   Poorly nourished   HENT:   Head: Normocephalic and atraumatic.   Eyes: Pupils are equal, round, and reactive to light. Right eye exhibits no discharge. Left eye exhibits no discharge. No scleral icterus.   Cardiovascular: Normal rate, regular rhythm and normal heart sounds.  Exam reveals no gallop and no friction rub.    No murmur heard.  Pulmonary/Chest: Effort normal. He has wheezes.   Abdominal: Soft. Bowel sounds are normal. He exhibits no distension and no mass. There is no tenderness. There is no guarding.   Musculoskeletal: He exhibits no edema or tenderness.   Neurological: He exhibits abnormal muscle tone.   Left sided weakness   Skin: Skin is warm and dry.   Nursing note and vitals reviewed.      RESULTS   Results Review:      Results from last 7 days  Lab Units 17  0728 17  0615 10/31/17  2133  10/31/17  0327   SODIUM mmol/L 142 139  --   --  138   SODIUM, ARTERIAL mmol/L  --   --  135.1*  < >  --    POTASSIUM mmol/L 3.4* 3.5  --   " --  3.5   CHLORIDE mmol/L 105 106  --   --  106   CO2 mmol/L 23.0 24.0  --   --  21.0*   BUN mg/dL 42* 39*  --   --  59*   CREATININE mg/dL 3.32* 2.65*  --   --  3.89*   CALCIUM mg/dL 8.0* 7.6*  --   --  6.7*   BILIRUBIN mg/dL 0.5 0.5  --   --  0.2   ALK PHOS U/L 59 50  --   --  41   ALT (SGPT) U/L 34 25  --   --  29   AST (SGOT) U/L 39 35  --   --  32   GLUCOSE mg/dL 137* 144*  --   --  141*   GLUCOSE, ARTERIAL mmol/L  --   --  165  < >  --    < > = values in this interval not displayed.    Estimated Creatinine Clearance: 20.1 mL/min (by C-G formula based on Cr of 3.32).      Results from last 7 days  Lab Units 11/02/17  0553 11/01/17  0615 10/31/17  0327  10/27/17  0801   MAGNESIUM mg/dL 1.9 1.8 1.8  < > 1.7   PHOSPHORUS mg/dL  --  5.5* 7.1*  --  7.6*   < > = values in this interval not displayed.    Results from last 7 days  Lab Units 11/02/17  0553 11/01/17  0615 10/31/17  0327 10/30/17  0420 10/29/17  0456   WBC 10*3/mm3 13.13* 9.59 7.88 10.25* 11.39*   HEMOGLOBIN g/dL 8.8* 8.0* 7.8* 8.8* 8.2*   PLATELETS 10*3/mm3 122* 100* 97* 111* 121*         Results from last 7 days  Lab Units 10/26/17  2318   INR  1.15         Imaging Results (last 24 hours)     Procedure Component Value Units Date/Time    XR Chest 1 View [[id number]] Collected:  11/01/17 0531     Updated:  11/01/17 1030    Narrative:       [facility]    Patient Name: ORAL SETHI UDAY    ORDERING: DORA SAUNDERS     ATTENDING: BRISSA MREINO     REFERRING: DORA SAUNDERS    -----------------------    PROCEDURE: Portable chest x-ray    TECHNIQUE: Single AP view of the chest    COMPARISON: 10/31/2017    HISTORY: intubated, J18.9 Pneumonia, unspecified organism J96.90  Respiratory failure, unspecified, unspecified whether with  hypoxia or hypercapnia D72.829 Elevated white blood cell count,  unspecified E87.6 Hypokalemia N17.9 Acute kidney failure,  unspecified E86.0 Dehydration R74.8 Abnormal levels of other  serum enzymes R41.82  Altered mental status, unspecified B18.2  Chronic viral hepatitis C N19 unspecified    FINDINGS:     Life-support devices: Endotracheal and enteric tubes have been  removed.    Lungs/pleura: Coarse interstitial markings are seen diffusely,  likely due to chronic lung disease. Biapical opacities are  present without significant change, probably due to scarring.    Heart, hilar and mediastinal structures: Stable accounting for  differences in projection and technique.      Impression:       CONCLUSION:  No acute pulmonary disease.    Electronically signed by:  Edwin Souza MD  11/1/2017 10:23 AM  CDT Workstation: [id number]    CT Head Without Contrast [[id number]] Collected:  11/01/17 1812     Updated:  11/01/17 1900    Narrative:         CT Head Without Contrast    History: Left-sided weakness    Axial scans of the brain were obtained without intravenous  contrast.  Coronal and sagital reconstructions were preformed.    This exam was performed according to our departmental  dose-optimization program, which includes automated exposure  control, adjustment of the mA and/or kV according to patient size  and/or use of iterative reconstruction technique.    DLP: 964.80    Comparison: October 30, 2017.    Bone windows are unremarkable.  The visualized paranasal sinuses are unremarkable.  Endotracheal tube and nasogastric tube have been removed.    Again images significantly limited by streak artifact from metal  plate covering right occipital parietal craniotomy defect  No hemorrhage.  No mass.  Again old left thalamic lacunar infarct and stable area of  ischemia right basal ganglia.  No abnormal areas of increased attenuation.  No midline shift.  No abnormal extra-axial fluid collections.      Impression:       CONCLUSION:  No change    [id number]    Electronically signed by:  Jackson Melendrez MD  11/1/2017 6:59 PM CDT  Workstation: [id number] Chest 1 View [[id number]] Collected:  11/02/17 0520     Updated:  11/02/17 0745     Narrative:       Exam: AP portable chest    INDICATION: Pneumonia    COMPARISON: 11/1/2017 at 5:15 AM    FINDINGS: AP portable chest. Heart size normal. Biapical pleural  thickening. Coarse interstitial markings present presumably due  to chronic interstitial lung disease. Acute infiltrate is not  excluded. There may be a small left pleural effusion.      Impression:       No significant change.    Electronically signed by:  Troy Metcalf MD  11/2/2017 7:02 AM  CDT Workstation: [id number]           MEDICATIONS      albuterol 2.5 mg Nebulization Q6H - RT   ampicillin 2 g Intravenous Q8H   ceftriaxone 2 g Intravenous Q12H   chlorhexidine 15 mL Mouth/Throat Q12H   famotidine 10 mg Intravenous BID   methylPREDNISolone sodium succinate 80 mg Intravenous Q6H   sodium chloride 500 mL Intravenous Once       midazolam 1 mg/hr Last Rate: Stopped (10/31/17 0545)   norepinephrine 0.02-0.3 mcg/kg/min Last Rate: Stopped (10/27/17 0814)   sodium chloride 50 mL/hr Last Rate: 50 mL/hr (11/02/17 0220)       Assessment/Plan   ASSESSMENT / PLAN    Principal Problem:    Pneumonia of both lungs due to infectious organism  Active Problems:    Decompensated COPD with exacerbation (chronic obstructive pulmonary disease)    Chronic pain    Acute on chronic respiratory failure with hypoxia and hypercapnia    Acute renal failure (ARF)    Hypokalemia    Cachexia    Anemia    GI bleeding      1. Acute kidney injury: Baseline creatinine 0.5-0.6 mg/dl.   - Etiology of ALLAN is pre-renal from decreased PO intake, sepsis leading to ATN.   - Admitted with a creatinine of 7.2 mg/dl. No change in cr and therefore started hd 10/30/17. Hd today and then sat and next week m/w/f may need tunnel cath next week if hd dependant.     2. Severe sepsis with endocarditis:  - Blood cultures positive for Enterococcus. Echo showed mitral valve vegetation, and possible septic emboli with left sided weakness. repeat culture NGSF  - On  vancomycin . Ct negative for  infarct    3. Anemia:   - ? Blood loss with h/o melena. No plans for scope at this time per GI.  - s/p 2 units of pRBC transfusion, check fe studies    4. Chronic hepatitis C and hepatitis b             This document has been electronically signed by James Mcmanus MD on November 2, 2017 9:47 AM

## 2017-11-03 ENCOUNTER — HOSPITAL ENCOUNTER (OUTPATIENT)
Facility: HOSPITAL | Age: 62
Discharge: OTHER INSTITUTION | End: 2017-11-23
Attending: INTERNAL MEDICINE | Admitting: INTERNAL MEDICINE

## 2017-11-03 ENCOUNTER — APPOINTMENT (OUTPATIENT)
Dept: ULTRASOUND IMAGING | Facility: HOSPITAL | Age: 62
End: 2017-11-03

## 2017-11-03 ENCOUNTER — APPOINTMENT (OUTPATIENT)
Dept: GENERAL RADIOLOGY | Facility: HOSPITAL | Age: 62
End: 2017-11-03

## 2017-11-03 ENCOUNTER — APPOINTMENT (OUTPATIENT)
Dept: CARDIOLOGY | Facility: HOSPITAL | Age: 62
End: 2017-11-03
Attending: INTERNAL MEDICINE

## 2017-11-03 VITALS
WEIGHT: 137 LBS | TEMPERATURE: 98 F | SYSTOLIC BLOOD PRESSURE: 163 MMHG | HEIGHT: 67 IN | RESPIRATION RATE: 14 BRPM | OXYGEN SATURATION: 98 % | HEART RATE: 71 BPM | DIASTOLIC BLOOD PRESSURE: 86 MMHG | BODY MASS INDEX: 21.5 KG/M2

## 2017-11-03 DIAGNOSIS — I47.20 VENTRICULAR TACHYCARDIA (HCC): ICD-10-CM

## 2017-11-03 DIAGNOSIS — Z78.9 IMPAIRED MOBILITY AND ADLS: ICD-10-CM

## 2017-11-03 DIAGNOSIS — Z74.09 IMPAIRED MOBILITY AND ADLS: ICD-10-CM

## 2017-11-03 DIAGNOSIS — N17.9 ACUTE RENAL FAILURE, UNSPECIFIED ACUTE RENAL FAILURE TYPE (HCC): Primary | ICD-10-CM

## 2017-11-03 DIAGNOSIS — I33.0 SUBACUTE BACTERIAL ENDOCARDITIS: ICD-10-CM

## 2017-11-03 DIAGNOSIS — Z74.09 IMPAIRED FUNCTIONAL MOBILITY, BALANCE, GAIT, AND ENDURANCE: ICD-10-CM

## 2017-11-03 LAB
ALBUMIN SERPL-MCNC: 2.7 G/DL (ref 3.4–4.8)
ALBUMIN/GLOB SERPL: 0.7 G/DL (ref 1.1–1.8)
ALP SERPL-CCNC: 71 U/L (ref 38–126)
ALT SERPL W P-5'-P-CCNC: 31 U/L (ref 21–72)
ANION GAP SERPL CALCULATED.3IONS-SCNC: 12 MMOL/L (ref 5–15)
AST SERPL-CCNC: 40 U/L (ref 17–59)
BACTERIA SPEC RESP CULT: NORMAL
BASOPHILS # BLD AUTO: 0 10*3/MM3 (ref 0–0.2)
BASOPHILS NFR BLD AUTO: 0 % (ref 0–2)
BH CV ECHO MEAS - MR MAX PG: 98.9 MMHG
BH CV ECHO MEAS - MR MAX VEL: 497.2 CM/SEC
BH CV ECHO MEAS - MV DEC SLOPE: 591.6 CM/SEC^2
BH CV ECHO MEAS - MV MAX PG: 9.2 MMHG
BH CV ECHO MEAS - MV MEAN PG: 4.4 MMHG
BH CV ECHO MEAS - MV P1/2T MAX VEL: 151.3 CM/SEC
BH CV ECHO MEAS - MV P1/2T: 74.9 MSEC
BH CV ECHO MEAS - MV V2 MAX: 152 CM/SEC
BH CV ECHO MEAS - MV V2 MEAN: 96.1 CM/SEC
BH CV ECHO MEAS - MV V2 VTI: 47.7 CM
BH CV ECHO MEAS - MVA P1/2T LCG: 1.5 CM^2
BH CV ECHO MEAS - MVA(P1/2T): 2.9 CM^2
BILIRUB SERPL-MCNC: 0.5 MG/DL (ref 0.2–1.3)
BUN BLD-MCNC: 30 MG/DL (ref 7–21)
BUN/CREAT SERPL: 13.5 (ref 7–25)
CALCIUM SPEC-SCNC: 8.1 MG/DL (ref 8.4–10.2)
CHLORIDE SERPL-SCNC: 105 MMOL/L (ref 95–110)
CO2 SERPL-SCNC: 26 MMOL/L (ref 22–31)
CREAT BLD-MCNC: 2.22 MG/DL (ref 0.7–1.3)
DEPRECATED RDW RBC AUTO: 43.2 FL (ref 35.1–43.9)
EOSINOPHIL # BLD AUTO: 0 10*3/MM3 (ref 0–0.7)
EOSINOPHIL NFR BLD AUTO: 0 % (ref 0–7)
ERYTHROCYTE [DISTWIDTH] IN BLOOD BY AUTOMATED COUNT: 14.8 % (ref 11.5–14.5)
FERRITIN SERPL-MCNC: 503 NG/ML (ref 17.9–464)
GFR SERPL CREATININE-BSD FRML MDRD: 30 ML/MIN/1.73 (ref 60–113)
GLOBULIN UR ELPH-MCNC: 3.7 GM/DL (ref 2.3–3.5)
GLUCOSE BLD-MCNC: 181 MG/DL (ref 60–100)
GRAM STN SPEC: NORMAL
HCT VFR BLD AUTO: 23.1 % (ref 39–49)
HGB BLD-MCNC: 7.8 G/DL (ref 13.7–17.3)
IMM GRANULOCYTES # BLD: 0.06 10*3/MM3 (ref 0–0.02)
IMM GRANULOCYTES NFR BLD: 0.5 % (ref 0–0.5)
IRON 24H UR-MRATE: 97 MCG/DL (ref 49–181)
IRON SATN MFR SERPL: 46 % (ref 20–55)
LYMPHOCYTES # BLD AUTO: 0.41 10*3/MM3 (ref 0.6–4.2)
LYMPHOCYTES NFR BLD AUTO: 3.4 % (ref 10–50)
MAXIMAL PREDICTED HEART RATE: 160 BPM
MCH RBC QN AUTO: 26.9 PG (ref 26.5–34)
MCHC RBC AUTO-ENTMCNC: 33.8 G/DL (ref 31.5–36.3)
MCV RBC AUTO: 79.7 FL (ref 80–98)
MONOCYTES # BLD AUTO: 0.38 10*3/MM3 (ref 0–0.9)
MONOCYTES NFR BLD AUTO: 3.1 % (ref 0–12)
NEUTROPHILS # BLD AUTO: 11.29 10*3/MM3 (ref 2–8.6)
NEUTROPHILS NFR BLD AUTO: 93 % (ref 37–80)
PLATELET # BLD AUTO: 98 10*3/MM3 (ref 150–450)
PMV BLD AUTO: 10.4 FL (ref 8–12)
POTASSIUM BLD-SCNC: 3.4 MMOL/L (ref 3.5–5.1)
PROT SERPL-MCNC: 6.4 G/DL (ref 6.3–8.6)
RBC # BLD AUTO: 2.9 10*6/MM3 (ref 4.37–5.74)
SODIUM BLD-SCNC: 143 MMOL/L (ref 137–145)
STRESS TARGET HR: 136 BPM
TIBC SERPL-MCNC: 211 MCG/DL (ref 261–462)
WBC NRBC COR # BLD: 12.14 10*3/MM3 (ref 3.2–9.8)

## 2017-11-03 PROCEDURE — 93010 ELECTROCARDIOGRAM REPORT: CPT | Performed by: INTERNAL MEDICINE

## 2017-11-03 PROCEDURE — 93005 ELECTROCARDIOGRAM TRACING: CPT | Performed by: INTERNAL MEDICINE

## 2017-11-03 PROCEDURE — 93325 DOPPLER ECHO COLOR FLOW MAPG: CPT

## 2017-11-03 PROCEDURE — 92610 EVALUATE SWALLOWING FUNCTION: CPT | Performed by: SPEECH-LANGUAGE PATHOLOGIST

## 2017-11-03 PROCEDURE — 25010000002 METHYLPREDNISOLONE PER 125 MG: Performed by: INTERNAL MEDICINE

## 2017-11-03 PROCEDURE — G8997 SWALLOW GOAL STATUS: HCPCS | Performed by: SPEECH-LANGUAGE PATHOLOGIST

## 2017-11-03 PROCEDURE — 80053 COMPREHEN METABOLIC PANEL: CPT | Performed by: INTERNAL MEDICINE

## 2017-11-03 PROCEDURE — 25010000002 CEFTRIAXONE: Performed by: INTERNAL MEDICINE

## 2017-11-03 PROCEDURE — 25010000003 POTASSIUM CHLORIDE 10 MEQ/100ML SOLUTION: Performed by: INTERNAL MEDICINE

## 2017-11-03 PROCEDURE — G8996 SWALLOW CURRENT STATUS: HCPCS | Performed by: SPEECH-LANGUAGE PATHOLOGIST

## 2017-11-03 PROCEDURE — 97110 THERAPEUTIC EXERCISES: CPT

## 2017-11-03 PROCEDURE — 93308 TTE F-UP OR LMTD: CPT

## 2017-11-03 PROCEDURE — 83550 IRON BINDING TEST: CPT | Performed by: INTERNAL MEDICINE

## 2017-11-03 PROCEDURE — 93321 DOPPLER ECHO F-UP/LMTD STD: CPT

## 2017-11-03 PROCEDURE — 85025 COMPLETE CBC W/AUTO DIFF WBC: CPT | Performed by: INTERNAL MEDICINE

## 2017-11-03 PROCEDURE — 87040 BLOOD CULTURE FOR BACTERIA: CPT | Performed by: INTERNAL MEDICINE

## 2017-11-03 PROCEDURE — 94760 N-INVAS EAR/PLS OXIMETRY 1: CPT

## 2017-11-03 PROCEDURE — 25010000002 CEFTRIAXONE PER 250 MG: Performed by: INTERNAL MEDICINE

## 2017-11-03 PROCEDURE — 87205 SMEAR GRAM STAIN: CPT | Performed by: INTERNAL MEDICINE

## 2017-11-03 PROCEDURE — 94799 UNLISTED PULMONARY SVC/PX: CPT

## 2017-11-03 PROCEDURE — 71010 HC CHEST PA OR AP: CPT

## 2017-11-03 PROCEDURE — 83540 ASSAY OF IRON: CPT | Performed by: INTERNAL MEDICINE

## 2017-11-03 PROCEDURE — 87086 URINE CULTURE/COLONY COUNT: CPT | Performed by: INTERNAL MEDICINE

## 2017-11-03 PROCEDURE — 82728 ASSAY OF FERRITIN: CPT | Performed by: INTERNAL MEDICINE

## 2017-11-03 RX ORDER — POTASSIUM CHLORIDE 7.45 MG/ML
10 INJECTION INTRAVENOUS
Status: DISCONTINUED | OUTPATIENT
Start: 2017-11-03 | End: 2017-11-03 | Stop reason: HOSPADM

## 2017-11-03 RX ORDER — HYDRALAZINE HYDROCHLORIDE 20 MG/ML
10 INJECTION INTRAMUSCULAR; INTRAVENOUS EVERY 6 HOURS PRN
Status: DISCONTINUED | OUTPATIENT
Start: 2017-11-03 | End: 2017-11-03 | Stop reason: HOSPADM

## 2017-11-03 RX ORDER — METHYLPREDNISOLONE SODIUM SUCCINATE 125 MG/2ML
80 INJECTION, POWDER, LYOPHILIZED, FOR SOLUTION INTRAMUSCULAR; INTRAVENOUS EVERY 6 HOURS
Qty: 1 VIAL | Refills: 12 | Status: SHIPPED | OUTPATIENT
Start: 2017-11-03

## 2017-11-03 RX ORDER — CHLORHEXIDINE GLUCONATE 0.12 MG/ML
15 RINSE ORAL EVERY 12 HOURS SCHEDULED
Status: DISCONTINUED | OUTPATIENT
Start: 2017-11-03 | End: 2017-11-23 | Stop reason: HOSPADM

## 2017-11-03 RX ORDER — SODIUM CHLORIDE 0.9 % (FLUSH) 0.9 %
10 SYRINGE (ML) INJECTION EVERY 8 HOURS SCHEDULED
Status: DISCONTINUED | OUTPATIENT
Start: 2017-11-03 | End: 2017-11-23 | Stop reason: HOSPADM

## 2017-11-03 RX ORDER — ALBUTEROL SULFATE 2.5 MG/3ML
2.5 SOLUTION RESPIRATORY (INHALATION)
Status: DISCONTINUED | OUTPATIENT
Start: 2017-11-03 | End: 2017-11-23 | Stop reason: HOSPADM

## 2017-11-03 RX ORDER — CHLORHEXIDINE GLUCONATE 0.12 MG/ML
15 RINSE ORAL EVERY 12 HOURS SCHEDULED
Qty: 60 ML | Refills: 12
Start: 2017-11-03

## 2017-11-03 RX ORDER — METHYLPREDNISOLONE SODIUM SUCCINATE 125 MG/2ML
80 INJECTION, POWDER, LYOPHILIZED, FOR SOLUTION INTRAMUSCULAR; INTRAVENOUS EVERY 6 HOURS
Status: DISCONTINUED | OUTPATIENT
Start: 2017-11-03 | End: 2017-11-13

## 2017-11-03 RX ORDER — MORPHINE SULFATE 8 MG/ML
4 INJECTION INTRAMUSCULAR; INTRAVENOUS; SUBCUTANEOUS
Status: DISCONTINUED | OUTPATIENT
Start: 2017-11-03 | End: 2017-11-13 | Stop reason: ALTCHOICE

## 2017-11-03 RX ORDER — HYDRALAZINE HYDROCHLORIDE 20 MG/ML
10 INJECTION INTRAMUSCULAR; INTRAVENOUS EVERY 6 HOURS PRN
Qty: 1 ML | Refills: 10
Start: 2017-11-03

## 2017-11-03 RX ORDER — FAMOTIDINE 10 MG/ML
10 INJECTION, SOLUTION INTRAVENOUS 2 TIMES DAILY
Status: DISCONTINUED | OUTPATIENT
Start: 2017-11-03 | End: 2017-11-23 | Stop reason: HOSPADM

## 2017-11-03 RX ORDER — MORPHINE SULFATE 8 MG/ML
4 INJECTION INTRAMUSCULAR; INTRAVENOUS; SUBCUTANEOUS
Qty: 1 ML | Refills: 0 | Status: SHIPPED | OUTPATIENT
Start: 2017-11-03

## 2017-11-03 RX ORDER — SODIUM CHLORIDE 9 MG/ML
INJECTION, SOLUTION INTRAVENOUS
Status: DISPENSED
Start: 2017-11-03 | End: 2017-11-04

## 2017-11-03 RX ORDER — SODIUM CHLORIDE 0.9 % (FLUSH) 0.9 %
10 SYRINGE (ML) INJECTION AS NEEDED
Status: DISCONTINUED | OUTPATIENT
Start: 2017-11-03 | End: 2017-11-23 | Stop reason: HOSPADM

## 2017-11-03 RX ORDER — LORAZEPAM 2 MG/ML
0.5 INJECTION INTRAMUSCULAR EVERY 4 HOURS PRN
Status: DISCONTINUED | OUTPATIENT
Start: 2017-11-03 | End: 2017-11-13

## 2017-11-03 RX ORDER — FAMOTIDINE 10 MG/ML
10 INJECTION, SOLUTION INTRAVENOUS 2 TIMES DAILY
Qty: 4 ML | Refills: 12 | Status: SHIPPED | OUTPATIENT
Start: 2017-11-03

## 2017-11-03 RX ADMIN — ALBUTEROL SULFATE 2.5 MG: 2.5 SOLUTION RESPIRATORY (INHALATION) at 13:12

## 2017-11-03 RX ADMIN — Medication 10 ML: at 10:07

## 2017-11-03 RX ADMIN — Medication 10 ML: at 10:10

## 2017-11-03 RX ADMIN — ALBUTEROL SULFATE 2.5 MG: 2.5 SOLUTION RESPIRATORY (INHALATION) at 01:32

## 2017-11-03 RX ADMIN — CHLORHEXIDINE GLUCONATE 15 ML: 1.2 RINSE ORAL at 10:06

## 2017-11-03 RX ADMIN — POTASSIUM CHLORIDE 10 MEQ: 7.46 INJECTION, SOLUTION INTRAVENOUS at 16:07

## 2017-11-03 RX ADMIN — AMPICILLIN SODIUM 2 G: 2 INJECTION, POWDER, FOR SOLUTION INTRAVENOUS at 02:50

## 2017-11-03 RX ADMIN — Medication 10 ML: at 10:09

## 2017-11-03 RX ADMIN — AMPICILLIN SODIUM 2 G: 2 INJECTION, POWDER, FOR SOLUTION INTRAVENOUS at 11:08

## 2017-11-03 RX ADMIN — FAMOTIDINE 10 MG: 10 INJECTION, SOLUTION INTRAVENOUS at 10:07

## 2017-11-03 RX ADMIN — METHYLPREDNISOLONE SODIUM SUCCINATE 80 MG: 125 INJECTION, POWDER, FOR SOLUTION INTRAMUSCULAR; INTRAVENOUS at 00:12

## 2017-11-03 RX ADMIN — POTASSIUM CHLORIDE 10 MEQ: 7.46 INJECTION, SOLUTION INTRAVENOUS at 14:56

## 2017-11-03 RX ADMIN — METHYLPREDNISOLONE SODIUM SUCCINATE 80 MG: 125 INJECTION, POWDER, FOR SOLUTION INTRAMUSCULAR; INTRAVENOUS at 05:39

## 2017-11-03 RX ADMIN — CEFTRIAXONE 2 G: 2 INJECTION, POWDER, FOR SOLUTION INTRAMUSCULAR; INTRAVENOUS at 11:09

## 2017-11-03 RX ADMIN — METHYLPREDNISOLONE SODIUM SUCCINATE 80 MG: 125 INJECTION, POWDER, FOR SOLUTION INTRAMUSCULAR; INTRAVENOUS at 11:08

## 2017-11-03 RX ADMIN — ALBUTEROL SULFATE 2.5 MG: 2.5 SOLUTION RESPIRATORY (INHALATION) at 07:09

## 2017-11-03 NOTE — PLAN OF CARE
"Problem: Patient Care Overview (Adult)  Goal: Plan of Care Review  Outcome: Ongoing (interventions implemented as appropriate)    11/03/17 1120   Outcome Evaluation   Outcome Summary/Follow up Plan Pt did not meet any PT goals this tx. Pt moaned \"Ooo\" the entire tx. Pt soiled in bed - required pericare and linen change. Pt dep for all mobiliyt. Pt would benefit from LTACH once discharged.    Coping/Psychosocial Response Interventions   Plan Of Care Reviewed With patient   Patient Care Overview   Progress unable to show any progress toward functional goals           "

## 2017-11-03 NOTE — NURSING NOTE
"Pts. Nephew's wife arrived to visit pt. She was expressing concerns over pt. Not being transferred to a \"stroke facility\". This RN explained to pt. Along with PARMJIT Burciaga that the pts. Daughter Medina at this time is legal next of kin and is making decisions for pt. Based on Dr. Chambers's recommendations. Explained to family member that Dr. Chambers will be rounding in the morning usually between 9-10am and if family has concerns, these can be expressed to her along with legal next of kin at that time via phone or in person. Spoke with Medina, pts. Daughter, throughout day shift and she is agreeable to LTAC placement tomorrow.  "

## 2017-11-03 NOTE — THERAPY EVALUATION
Acute Care - Speech Language Pathology   Swallow Initial Evaluation [facility]     Patient Name: Héctor Koo  : 1955  MRN: [id number]  Today's Date: 11/3/2017  Onset of Illness/Injury or Date of Surgery Date: 10/26/17            Admit Date: 10/26/2017    Visit Dx:     ICD-10-CM ICD-9-CM   1. Pneumonia of both lungs due to infectious organism, unspecified part of lung J18.9 483.8   2. Type I respiratory failure J96.90 518.81   3. Leukocytosis, unspecified type D72.829 288.60   4. Hypokalemia E87.6 276.8   5. Acute renal failure, unspecified acute renal failure type N17.9 584.9   6. Dehydration E86.0 276.51   7. Elevated troponin R74.8 790.6   8. Altered mental status, unspecified altered mental status type R41.82 780.97   9. Chronic hepatitis C without hepatic coma B18.2 070.54   10. Uremia N19 586   11. Anemia, unspecified type D64.9 285.9   12. Thrombocytopenia D69.6 287.5   13. Impaired functional mobility, balance, gait, and endurance Z74.09 V49.89   14. Decreased activities of daily living (ADL) Z78.9 V49.89   15. Oropharyngeal dysphagia R13.12 787.22     Patient Active Problem List   Diagnosis   • Decompensated COPD with exacerbation (chronic obstructive pulmonary disease)   • Chronic pain   • Acute on chronic respiratory failure with hypoxia and hypercapnia   • Pneumonia of both lungs due to infectious organism   • Atherosclerosis   • Acute renal failure (ARF)   • Hypokalemia   • Cachexia   • Anemia   • GI bleeding     Past Medical History:   Diagnosis Date   • Atherosclerosis    • Chronic obstructive lung disease    • Chronic pain    • Drug dependence    • Hypertension      Past Surgical History:   Procedure Laterality Date   • DIAPHRAGMATIC HERNIA REPAIR     • HIP SURGERY     • LIVER BIOPSY            SWALLOW EVALUATION (last 72 hours)      Swallow Evaluation       17 0940                Rehab Evaluation    Document Type evaluation  -EK        Subjective Information no  complaints;agree to therapy  -EK        General Information    Patient Profile Review yes  -EK        Prior Level of Function- Communication limited to basic wants/needs   slurred speech; decreased alertness  -EK        Clinical Impression    SLP Swallowing Diagnosis moderate dysphagia  -EK        Rehab Potential/Prognosis, Swallowing good, to achieve stated therapy goals  -EK        Criteria for Skilled Therapeutic Interventions Met skilled criteria for dysphagia intervention met  -EK        FCM, Swallowing 1-->Level 1  -EK        Predicted Duration Therapy Interv (days) 3-5 days  -EK        Expected Duration Therapy Session (min) 15-30 minutes;30-45 minutes  -EK        SLP Diet Recommendation NPO: unsafe for food/liquid intake  -EK        Pain Assessment    Pain Assessment No/denies pain  -EK        Cognitive Assessment/Intervention    Current Cognitive/Communication Assessment impaired  -EK        Orientation Status unable/difficult to assess  -EK        Follows Commands/Answers Questions unable/difficult to assess  -EK        Oral Motor Structure and Function    Dentition Assessment edentulous, does not have dentures  -EK        Oral Motor Assessment Comment Right side labial and lingual weakness; pt with anterior spillage from right side.   -EK        Clinical Swallow Exam    Mode of Presentation fed by clinician  -EK        Oral Preparation Concerns increased prep time;pudding:  -EK        Oral Phase Results impaired oral phase, signs of dysfunction present  -EK        Pharyngeal Phase Results sign/symptoms of pharyngeal impairment  -EK        Summary of Clinical Exam Pt with decreased alertness and inability to follow oral motor exercises. Pt with anterior spillage from right side, pt with oral residue and concern for aspiration risk.   -EK        Swallow Recommendations    Oral Care oral care with toothbrush and dentifrice as described  -EK        Recommended Diet NPO: unsafe for food/liquid intake  -EK         Dysphagia Treatment Objectives and Progress    Dysphagia Treatment Objectives Other 1  -EK        Dysphagia Other 1    Dysphagia Other 1 Objective Pt to tolerate po trials for diet initiation and diet tolerance with no s/s of aspiration.   -EK        Status: Dysphagia Other 1 New  -EK        Positioning and Restraints    Pre-Treatment Position in bed  -EK        Post Treatment Position bed  -EK          User Key  (r) = Recorded By, (t) = Taken By, (c) = Cosigned By    Initials Name Effective Dates    RYLEY Gonzalez CCC-SLP 04/06/17 -         EDUCATION  The patient has been educated in the following areas:   Dysphagia (Swallowing Impairment).    SLP Recommendation and Plan  SLP Swallowing Diagnosis: moderate dysphagia  SLP Diet Recommendation: NPO: unsafe for food/liquid intake              Criteria for Skilled Therapeutic Interventions Met: skilled criteria for dysphagia intervention met     Rehab Potential/Prognosis, Swallowing: good, to achieve stated therapy goals                Plan of Care Review  Plan Of Care Reviewed With: patient  Progress: no change  Outcome Summary/Follow up Plan: Swallow evaluation completed; continue NPO and will re-assess swallow in a.m. Pt exhibits right side labial weakness and decreased bolus and control of secretions.           IP SLP Goals       11/03/17 1550          Begin to Take Some PO Safely    Begin to Take Some PO Safely- SLP, Date Established 11/03/17  -EK      Begin to Take Some PO Safely- SLP, Activity Level Patient will improve oral skills for more efficient eating;Patient will improve timing of pharyngeal response for safer, more efficient swallow  -EK      Begin to Take Some PO Safely- SLP, Additional Goal Pt to tolerate po trials with no s/s of aspiration.   -EK        User Key  (r) = Recorded By, (t) = Taken By, (c) = Cosigned By    Initials Name Provider Type    RYLEY Gonzalez CCC-SLP Speech and Language Pathologist                Time Calculation:         Time Calculation- SLP       11/03/17 1552          Time Calculation- SLP    SLP Start Time 0940  -EK      SLP Stop Time 0955  -EK      SLP Time Calculation (min) 15 min  -EK      SLP Received On 11/03/17  -EK        User Key  (r) = Recorded By, (t) = Taken By, (c) = Cosigned By    Initials Name Provider Type     SHAHZAD Haq Speech and Language Pathologist          Therapy Charges for Today     Code Description Service Date Service Provider Modifiers Qty    19332802079 HC ST EVAL ORAL PHARYNG SWALLOW 1 11/3/2017 SHAHZAD Haq GN, KX 1    48093059481 HC ST SWALLOWING CURRENT STATUS 11/3/2017 SHAHZAD Haq, CK 1    61340404053 HC ST SWALLOWING PROJECTED 11/3/2017 SHAHZAD Haq GN, CI 1          SLP G-Codes  Functional Limitations: Swallowing  Swallow Current Status (): At least 40 percent but less than 60 percent impaired, limited or restricted  Swallow Goal Status (): At least 1 percent but less than 20 percent impaired, limited or restricted    SHAHZAD Haq  11/3/2017

## 2017-11-03 NOTE — PLAN OF CARE
Problem: Patient Care Overview (Adult)  Goal: Plan of Care Review  Outcome: Ongoing (interventions implemented as appropriate)    11/03/17 0813   Outcome Evaluation   Outcome Summary/Follow up Plan EN restarted via DHT related to pt failing the swallowing eval and still with decreased alertness. Goal rate for feedings is 45cc/hr.    Coping/Psychosocial Response Interventions   Plan Of Care Reviewed With caregiver   Patient Care Overview   Progress improving         Problem: Nutrition, Enteral (Adult)  Goal: Signs and Symptoms of Listed Potential Problems Will be Absent or Manageable (Nutrition, Enteral)  Outcome: Ongoing (interventions implemented as appropriate)

## 2017-11-03 NOTE — CONSULTS
Adult Nutrition  Assessment    Patient Name:  Héctor Koo  YOB: 1955  MRN: [id number]  Admit Date:  10/26/2017    Assessment Date:  11/3/2017    Comments:  Pt remains NPO.  SLP did evaluate pt but NPO status was recommended.  Pt still with decreased alertness.  DHT placed and EN started.  Nepro currently at 35cc/hr and goal rate is 45cc/hr.  Dialysis continues.  Pt dxed with Pneumonia; COPD; Acute Renal Failure; Severe Sepsis with Endocarditis; Anemia and Chronic Hep B & C.  RD will monitor on tube feeding.          Reason for Assessment       11/03/17 1340    Reason for Assessment    Reason For Assessment/Visit follow up protocol                Nutrition/Diet History       11/03/17 1340    Nutrition/Diet History    Typical Food/Fluid Intake Pt resting.  NOnverbal              Labs/Tests/Procedures/Meds       11/03/17 1342    Labs/Tests/Procedures/Meds    Labs/Tests Review Reviewed;K+;Glucose;BUN;Creat;Alb    Medication Review Reviewed, pertinent            Physical Findings       11/03/17 1343    Physical Appearance    Overall Physical Appearance underweight    Tubes oroduodenal tube              Nutrition Prescription Ordered       11/03/17 1343    Nutrition Prescription PO    Current PO Diet NPO    Nutrition Prescription EN    Enteral Route Other (comment)   DHT    Product Nepro    TF Delivery Method Continuous    Continuous TF Goal Rate (mL/hr) 45 mL/hr    Continuous TF Current Rate (mL/hr) 35 mL/hr    Continuous TF Goal Volume (mL) 1080 mL    Water flush (mL)  25 mL    Water Flush Frequency Every 2 hours            Evaluation of Received Nutrient/Fluid Intake       11/03/17 1345    Evaluation of Received Nutrient/Fluid Intake    Nutrition Delivered Calorie Evaluation;Protein Evaluation    Calorie Intake Evaluation    Enteral Calories (kcal) 1512    Total Calories (kcal) 1512    % Kcal Needs 84% of estimated needs (1800)    Protein Intake Evaluation    Enteral Protein (gm) 68    Total  Protein (gm) 68    % Protein Needs 84% of estimated needs (1800)            Electronically signed by:  Eloisa Lan RD  11/03/17 1:55 PM

## 2017-11-03 NOTE — PROGRESS NOTES
[facility] Medicine Services  INPATIENT PROGRESS NOTE    Length of Stay: 7  Date of Admission: 10/26/2017  Primary Care Physician: GUSTAVO Aguilar    Subjective   Chief Complaint:   Chief Complaint   Patient presents with   • Altered Mental Status         HPI  Daughter is  Stating that her dad had a stroke while being in the hospital but other member of his family stated that his left hemiplegia  Was present  Prior  To his admission . We did two Ct of his brain that did not show any new strokes ,Mri cannot be done  As he has a metal plate in head ,radiologist called and stated  The test  Wont be possible ,the plate will  Create too much artefact .  This daughter first refused that he goes to the [facility] then accepted .   We tried to  Meet with her  To discuss her dad`s condition and plan of care she  Never showed up .   Patient never came to see her father all our conversations were over the phone .  Patient today is alittle more alert . Said a word or two .  He failed his swallow evaluation  . Has a Dobhoff  With tube feeds   Has been extubated   Is on HD kidney function improved   Will repeat echocardiogram today to evaluate mitral valve . He will need 6 weeks ov IV antibiotics       Review of Systems   Constitutional: Negative for activity change, appetite change, chills, diaphoresis, fatigue, fever and unexpected weight change.   HENT: Negative.  Negative for congestion, dental problem, drooling, ear discharge, ear pain, facial swelling, hearing loss, mouth sores, nosebleeds, postnasal drip, rhinorrhea, sinus pressure, sneezing, tinnitus, trouble swallowing and voice change.    Eyes: Negative for photophobia and discharge.   Respiratory: Negative for apnea, cough, choking, shortness of breath, wheezing and stridor.    Cardiovascular: Negative for chest pain, palpitations and leg swelling.   Gastrointestinal: Negative for abdominal pain, anal bleeding, blood in  stool, constipation, diarrhea, nausea, rectal pain and vomiting.   Endocrine: Negative for cold intolerance, heat intolerance, polydipsia, polyphagia and polyuria.   Genitourinary: Negative for dysuria, enuresis, frequency, hematuria and urgency.   Musculoskeletal: Negative for arthralgias, back pain, joint swelling, myalgias, neck pain and neck stiffness.   Skin: Negative for color change, pallor, rash and wound.   Allergic/Immunologic: Negative for food allergies and immunocompromised state.   Neurological: Positive for facial asymmetry, speech difficulty and weakness. Negative for dizziness, tremors, seizures, syncope, light-headedness, numbness and headaches.   Hematological: Negative for adenopathy.   Psychiatric/Behavioral: Negative for agitation, behavioral problems, confusion, hallucinations, sleep disturbance and suicidal ideas. The patient is not nervous/anxious.         All pertinent negatives and positives are as above. All other systems have been reviewed and are negative unless otherwise stated.     Objective    Temp:  [97.6 °F (36.4 °C)-98.9 °F (37.2 °C)] 98 °F (36.7 °C)  Heart Rate:  [54-87] 78  Resp:  [12-20] 14  BP: (131-210)/() 156/81  Physical Exam   Constitutional: He appears well-developed and well-nourished.   HENT:   Head: Normocephalic and atraumatic.   Eyes: EOM are normal. Pupils are equal, round, and reactive to light.   Neck: Normal range of motion. Neck supple.   Cardiovascular: Regular rhythm, normal heart sounds and intact distal pulses.  Exam reveals no gallop and no friction rub.    No murmur heard.  Pulmonary/Chest: Effort normal and breath sounds normal. No respiratory distress. He has no rales. He exhibits no tenderness.   Abdominal: Soft. Bowel sounds are normal. He exhibits no distension. There is no tenderness. There is no guarding.   Neurological:   Aphasia   Left sided hemiplegia    Skin: Skin is warm and dry.           Results Review:  I have reviewed the labs,  radiology results, and diagnostic studies.    Laboratory Data:   Lab Results (last 24 hours)     Procedure Component Value Units Date/Time    Blood Culture - Blood, [[id number]]  (Normal) Collected:  10/30/17 1450    Specimen:  Blood from Arm, Left Updated:  11/02/17 1516     Blood Culture No growth at 3 days    Blood Culture - Blood, [[id number]]  (Normal) Collected:  10/30/17 1807    Specimen:  Blood from Wrist, Right Updated:  11/02/17 1816     Blood Culture No growth at 3 days    CBC & Differential [[id number]] Collected:  11/03/17 1005    Specimen:  Blood Updated:  11/03/17 1014    Narrative:       The following orders were created for panel order CBC & Differential.  Procedure                               Abnormality         Status                     ---------                               -----------         ------                     CBC Auto Differential[[id number]]        Abnormal            Final result                 Please view results for these tests on the individual orders.    CBC Auto Differential [[id number]]  (Abnormal) Collected:  11/03/17 1005    Specimen:  Blood Updated:  11/03/17 1014     WBC 12.14 (H) 10*3/mm3      RBC 2.90 (L) 10*6/mm3      Hemoglobin 7.8 (L) g/dL      Hematocrit 23.1 (L) %      MCV 79.7 (L) fL      MCH 26.9 pg      MCHC 33.8 g/dL      RDW 14.8 (H) %      RDW-SD 43.2 fl      MPV 10.4 fL      Platelets 98 (L) 10*3/mm3      Neutrophil % 93.0 (H) %      Lymphocyte % 3.4 (L) %      Monocyte % 3.1 %      Eosinophil % 0.0 %      Basophil % 0.0 %      Immature Grans % 0.5 %      Neutrophils, Absolute 11.29 (H) 10*3/mm3      Lymphocytes, Absolute 0.41 (L) 10*3/mm3      Monocytes, Absolute 0.38 10*3/mm3      Eosinophils, Absolute 0.00 10*3/mm3      Basophils, Absolute 0.00 10*3/mm3      Immature Grans, Absolute 0.06 (H) 10*3/mm3     Comprehensive Metabolic Panel [[id number]]  (Abnormal) Collected:  11/03/17 1008    Specimen:  Blood Updated:  11/03/17 1028     Glucose 181 (H) mg/dL       BUN 30 (H) mg/dL      Creatinine 2.22 (H) mg/dL      Sodium 143 mmol/L      Potassium 3.4 (L) mmol/L      Chloride 105 mmol/L      CO2 26.0 mmol/L      Calcium 8.1 (L) mg/dL      Total Protein 6.4 g/dL      Albumin 2.70 (L) g/dL      ALT (SGPT) 31 U/L      AST (SGOT) 40 U/L      Alkaline Phosphatase 71 U/L      Total Bilirubin 0.5 mg/dL      eGFR Non African Amer 30 (L) mL/min/1.73      Globulin 3.7 (H) gm/dL      A/G Ratio 0.7 (L) g/dL      BUN/Creatinine Ratio 13.5     Anion Gap 12.0 mmol/L     Iron Profile [[id number]]  (Abnormal) Collected:  11/03/17 1005    Specimen:  Blood Updated:  11/03/17 1035     Iron 97 mcg/dL      TIBC 211 (L) mcg/dL      Iron Saturation 46 %     Ferritin [[id number]]  (Abnormal) Collected:  11/03/17 1005    Specimen:  Blood Updated:  11/03/17 1103     Ferritin 503.00 (H) ng/mL           Culture Data:   No results found for: BLOODCX  No results found for: URINECX  No results found for: RESPCX  No results found for: WOUNDCX  No results found for: STOOLCX  No components found for: BODYFLD    Radiology Data:   Imaging Results (last 24 hours)     Procedure Component Value Units Date/Time    IR PICC wo fluoro guidance [[id number]] Resulted:  11/02/17 1544     Updated:  11/02/17 1544    Narrative:       This procedure was auto-finalized with no dictation required.    US Guided Vascular Access [[id number]] Resulted:  11/02/17 1604     Updated:  11/02/17 1604    Narrative:       This procedure was auto-finalized with no dictation required.    XR Chest Post CVA Port [[id number]] Collected:  11/02/17 1557     Updated:  11/02/17 1621    Narrative:       PROCEDURE: AP upright portable chest at 4:00 PM.    INDICATION: PICC insertion.    COMPARISON: 11/2/2017 exam earlier the same date at 5:12 AM and  multiple earlier exams..    Interval insertion of a left-sided PICC with tip mid SVC.    There also has been insertion of an NG tube tip in stomach.    Heart size normal with normal vascularity. Chronic  fibrotic  changes. Chronic bilateral apical pleural thickening left greater  than right. No pleural effusions.      Impression:       Left PICC tip SVC.    NG tube tip in stomach.    Chronic pulmonary fibrotic changes and chronic bilateral left  greater than right apical pleural thickening.    [id number]    Electronically signed by:  Grabiel Adams MD  11/2/2017 4:20  PM CDT Workstation: [email or web address]          I have reviewed the patient current medications.     Assessment/Plan     Principal Problem:    Pneumonia of both lungs due to infectious organism  Active Problems:    Acute on chronic respiratory failure with hypoxia and hypercapnia    Acute renal failure (ARF)    Hypokalemia( resolved)    GI bleeding    Decompensated COPD with exacerbation (chronic obstructive pulmonary disease)    Chronic pain    Cachexia    Anemia  Infective endocarditis   Enterococcus bacteremia ( negative repeat  Blood cultures)    UTI   Hypotension (resolved )     Left sided weakness   Old strokes on CT of brain   Sinus bradycardia           Plan:  Patient self extubated  Is on o2 will get a chest xray in am   Continue with pressors . Is now off  Pressors BP is controlled    continue with iv antibiotics for 6 weeks  , repeat blood cultures are negative so far   Ct of the brain done no new findings . Could not do MRI as  He has a metallic plate  It would create too much artefact ( radiologist called)   Nephrology on board   Kidney function better after dialysis    will repeat echocardiogram in a few days  To assess vegetations   Will repeat echocardiogram today   Gi evaluated the patient  At one point the patient will have  An EGD ,hiv will be ordered   HB is stable no transfusion for today 11/01HB is 8 today 11/01  Hb still stable wont need transfusion today 11/03  Will get swallow evaluation   11/02 :failed swallow evaluation today  Will get Dobbhoff and tube feeds for now  reassess at a later time  If  Fails again consider a peg tube   For nutrition   Started PT /OT     Katie Chambers MD   11/03/17   2:22 PM

## 2017-11-03 NOTE — PROGRESS NOTES
Cardiology Progress Note:     LOS: 7 days   Patient Care Team:  GUSTAVO Aguilar as PCP - General (Family Medicine)      Subjective:    Chart reviewed , patient seen and examined.  Patient mental status is unchanged.  Patient has not had any recurrence of bradycardia arrhythmia.  Patient blood pressure is elevated at the present time to 200/99.  Patient is arousable but continues to have expressive aphasia.  Patient has not had any recurrence of fever.  Patient hemoglobin yesterday was 8.8.          Objective:     Objective:  Vitals:    11/03/17 0802   BP: 135/77   Pulse: 84   Resp: 12   Temp:    SpO2: 96%       Intake/Output Summary (Last 24 hours) at 11/03/17 1005  Last data filed at 11/03/17 0800   Gross per 24 hour   Intake             1140 ml   Output             1785 ml   Net             -645 ml             Physical Exam:   General Appearance:    Arousable with expressive aphasia.     Head:    Normocephalic, atraumatic, without obvious abnormality   Eyes:           MARISOL  Lids and lashes normal, conjunctivae and sclerae normal, no icterus, no pallor   Ears:    Ears appear intact with no abnormalities noted   Throat:   Mucous membranes pink and moist   Neck:   Supple, trachea midline, no carotid bruit, no organomegaly or JVD   Lungs:     Clear to auscultation and percussion, respirations regular, even and Unlabored. No wheezes, rales, rhonchi    Heart:    Regular rhythm and normal rate, normal S1 and S2, no            murmur, no gallop, no rub, no click   Abdomen:     Soft, non-tender, non-distended, no guarding, no rebound tenderness, Normal bowel sounds in all four quadrant, no masses, liver and spleen nonpalpable,    Genitalia:    Deferred   Extremities:   Moves all extremities well, no edema, no cyanosis, no              Redness, no clubbing   Pulses:   Pulses palpable and equal bilaterally   Skin:   Moist and warm. No bleeding, bruising or rash   Neurologic/Psychiatric:   Patient has left-sided  hemiparesis with expressive aphasia.  Patient is able to move his right upper and lower extremity on command there is no involuntary movement noted.              Results Review:    Lab Results (last 24 hours)     Procedure Component Value Units Date/Time    Blood Culture - Blood, [[id number]]  (Normal) Collected:  10/30/17 1450    Specimen:  Blood from Arm, Left Updated:  11/02/17 1516     Blood Culture No growth at 3 days    Blood Culture - Blood, [[id number]]  (Normal) Collected:  10/30/17 1807    Specimen:  Blood from Wrist, Right Updated:  11/02/17 1816     Blood Culture No growth at 3 days           Medication Review:   Current Facility-Administered Medications   Medication Dose Route Frequency Provider Last Rate Last Dose   • albuterol (PROVENTIL) nebulizer solution 0.083% 2.5 mg/3mL  2.5 mg Nebulization Q6H - RT Juan David Storey MD   2.5 mg at 11/03/17 0709   • ampicillin 2 g/100 mL 0.9% NS (MBP)  2 g Intravenous Q8H Katie Chambers MD   2 g at 11/03/17 0250   • cefTRIAXone (ROCEPHIN) 2 g/100 mL 0.9% NS VTB (ISAEL)  2 g Intravenous Q12H Katie Chambers MD   2 g at 11/02/17 2313   • chlorhexidine (PERIDEX) 0.12 % solution 15 mL  15 mL Mouth/Throat Q12H Jaya Harrison MD   15 mL at 11/02/17 2051   • famotidine (PEPCID) injection 10 mg  10 mg Intravenous BID Jaya Harrison MD   10 mg at 11/02/17 1820   • hydrALAZINE (APRESOLINE) injection 10 mg  10 mg Intravenous Q6H PRN Mike Alvarado MD       • influenza vac split quad (FLUZONE QUADRIVALENT) IM suspension 0.5 mL  0.5 mL Intramuscular During Hospitalization Jaya Harrison MD       • methylPREDNISolone sodium succinate (SOLU-Medrol) injection 80 mg  80 mg Intravenous Q6H Jaya Harrison MD   80 mg at 11/03/17 0539   • midazolam (VERSED) 50 mg in sodium chloride 0.9 % 100 mL (0.5 mg/mL) infusion  1 mg/hr Intravenous Titrated Jaya Harrison MD   Stopped at 10/31/17 9093   • morphine injection 4 mg  4 mg Intravenous Q1H PRN Jaya Harrison MD   4 mg at 10/30/17 7050    • norepinephrine (LEVOPHED) 8,000 mcg in sodium chloride 0.9 % 250 mL (32 mcg/mL) infusion  0.02-0.3 mcg/kg/min Intravenous Titrated Jaya Harrison MD   Stopped at 10/27/17 0814   • pneumococcal polysaccharide 23-valent (PNEUMOVAX-23) vaccine 0.5 mL  0.5 mL Intramuscular During Hospitalization Jaya Harrison MD       • [START ON 11/4/2017] sodium chloride 0.9 % bolus 1,000 mL  1,000 mL Intravenous PRN James Mcmanus MD       • sodium chloride 0.9 % bolus 100 mL  100 mL Intravenous PRN James Mcmanus MD       • sodium chloride 0.9 % bolus 100 mL  100 mL Intravenous PRN James Mcmanus MD       • sodium chloride 0.9 % bolus 100 mL  100 mL Intravenous PRN James Mcmanus MD       • [START ON 11/4/2017] sodium chloride 0.9 % bolus 100 mL  100 mL Intravenous PRN James Mcmanus MD       • sodium chloride 0.9 % bolus 500 mL  500 mL Intravenous Once James Mcmanus MD   Stopped at 10/30/17 1100   • sodium chloride 0.9 % flush 1-10 mL  1-10 mL Intravenous PRN Jaya Harrison MD       • sodium chloride 0.9 % flush 10 mL  10 mL Intracatheter Q12H Katie Chambers MD   10 mL at 11/02/17 2051   • sodium chloride 0.9 % flush 10 mL  10 mL Intracatheter PRN Katie Chambers MD       • sodium chloride 0.9 % infusion  50 mL/hr Intravenous Continuous James Mcmanus MD 50 mL/hr at 11/02/17 0220 50 mL/hr at 11/02/17 0220       Assessment and Plan:    Principal Problem:    Pneumonia of both lungs due to infectious organism  Active Problems:    Decompensated COPD with exacerbation (chronic obstructive pulmonary disease)    Chronic pain    Acute on chronic respiratory failure with hypoxia and hypercapnia    Acute renal failure (ARF)    Hypokalemia    Cachexia    Anemia    GI bleeding  1.  Labile blood pressure.  Patient blood pressure is elevated.  Patient currently is not on any antihypertensive medication.  Would consider using hydralazine 10 mg IV on a when necessary basis for systolic blood pressure greater than 150.  Would also consider adding  nitrates if the patient blood pressure control rate for afterload reduction.  2.  Mitral valve endocarditis with moderate mitral and moderate tricuspid regurgitation.  Patient is currently on antibiotics.  Clinically patient has not had any further febrile episode.  Patient would be continued on antibiotics for at least 6 weeks and undergo a repeat echocardiogram.  Patient is not a candidate for any invasive intervention from the cardiac standpoint.  3.  Cerebrovascular accident with left-sided hemiparesis with expressive aphasia.  Patient was evaluated further LTACH unit.  Patient is being currently evaluated for [facility] in [place].  4.  Renal insufficiency.  Patient is currently on hemodialysis and has been followed by Dr. Mcmanus.  5.  Anemia.  Patient's hemoglobin is 8.8 yesterday patient has had evaluation by gastroenterology.            Mike Alvarado MD  11/03/17  10:05 AM      Time: Time spent on face-to-face interaction 20 minutes.    EMR Dragon/Transcription disclaimer:   Much of this encounter note is an electronic transcription/translation of spoken language to printed text. The electronic translation of spoken language may permit erroneous, or at times,

## 2017-11-03 NOTE — PLAN OF CARE
Problem: Patient Care Overview (Adult)  Goal: Plan of Care Review    11/03/17 0552   Outcome Evaluation   Outcome Summary/Follow up Plan Pt has been stable throughout the night.Has had difficulty staying asleep and has been restless. R wrist remains restrained r/t pulling lines & tubes and attempts to still pull them out when restraint is free'd . Pt able to state if in pain and has stated no when asked. Pt's raulito called last night and has requested cancellation of LTACH and to be transferred to another facility with neuro - will relay message to oncoming nurse.   Coping/Psychosocial Response Interventions   Plan Of Care Reviewed With daughter;patient       Goal: Adult Individualization and Mutuality  Outcome: Ongoing (interventions implemented as appropriate)  Goal: Discharge Needs Assessment  Outcome: Ongoing (interventions implemented as appropriate)    Problem: Pressure Ulcer Risk (Yogi Scale) (Adult,Obstetrics,Pediatric)  Goal: Skin Integrity  Outcome: Ongoing (interventions implemented as appropriate)    Problem: Fall Risk (Adult)  Goal: Identify Related Risk Factors and Signs and Symptoms  Outcome: Ongoing (interventions implemented as appropriate)    Problem: SAFETY - NON-VIOLENT RESTRAINT  Goal: Remains free of injury from restraints (Non-Violent Restraint)  Outcome: Ongoing (interventions implemented as appropriate)  Goal: Free from restraint(s) (Non-Violent Restraint)  Outcome: Ongoing (interventions implemented as appropriate)

## 2017-11-03 NOTE — THERAPY DISCHARGE NOTE
Acute Care - Physical Therapy Treatment Note/Discharge  [facility]     Patient Name: Hétcor Koo  : 1955  MRN: [id number]  Today's Date: 11/3/2017  Onset of Illness/Injury or Date of Surgery Date: 10/26/17  Date of Referral to PT: 17  Referring Physician: Dr. Chambers    Admit Date: 10/26/2017    Visit Dx:    ICD-10-CM ICD-9-CM   1. Pneumonia of both lungs due to infectious organism, unspecified part of lung J18.9 483.8   2. Type I respiratory failure J96.90 518.81   3. Leukocytosis, unspecified type D72.829 288.60   4. Hypokalemia E87.6 276.8   5. Acute renal failure, unspecified acute renal failure type N17.9 584.9   6. Dehydration E86.0 276.51   7. Elevated troponin R74.8 790.6   8. Altered mental status, unspecified altered mental status type R41.82 780.97   9. Chronic hepatitis C without hepatic coma B18.2 070.54   10. Uremia N19 586   11. Anemia, unspecified type D64.9 285.9   12. Thrombocytopenia D69.6 287.5   13. Impaired functional mobility, balance, gait, and endurance Z74.09 V49.89   14. Decreased activities of daily living (ADL) Z78.9 V49.89   15. Oropharyngeal dysphagia R13.12 787.22     Patient Active Problem List   Diagnosis   • Decompensated COPD with exacerbation (chronic obstructive pulmonary disease)   • Chronic pain   • Acute on chronic respiratory failure with hypoxia and hypercapnia   • Pneumonia of both lungs due to infectious organism   • Atherosclerosis   • Acute renal failure (ARF)   • Hypokalemia   • Cachexia   • Anemia   • GI bleeding       Physical Therapy Education     Title: PT OT SLP Therapies (Resolved)     Topic: Physical Therapy (Resolved)     Point: Mobility training (Resolved)    Learning Progress Summary    Learner Readiness Method Response Comment Documented by Status   Patient Nonacceptance E NR importance of mobility MN 17 1436 Active                      User Key     Initials Effective Dates Name Provider Type Discipline    MN 10/17/16 -   Norah Garcia, PT Physical Therapist PT                    IP PT Goals       11/02/17 1433          Bed Mobility PT STG    Bed Mobility PT STG, Date Established 11/02/17  -MN      Bed Mobility PT STG, Time to Achieve 1 wk  -MN      Bed Mobility PT STG, Activity Type roll left/roll right;supine to sit/sit to supine  -MN      Bed Mobility PT STG, [place] Level moderate assist (50% patient effort)  -MN      Transfer Training Goal, Assist Device bed rails  -MN      Transfer Training PT STG    Transfer Training PT STG, Date Established 11/02/17  -MN      Transfer Training PT STG, Time to Achieve 1 wk  -MN      Transfer Training PT STG, Activity Type bed to chair /chair to bed;toilet  -MN      Transfer Training PT STG, [place] Level maximum assist (25% patient effort)  -MN      Transfer Training PT STG, Assist Device --   via scoot/squat pivot transfer  -MN      Static Sitting Balance PT STG    Static Sitting Balance PT STG, Date Established 11/02/17  -MN      Static Sitting Balance PT STG, Time to Achieve 2 wks  -MN      Static Sitting Balance PT STG, [place] Level moderate assist (50% patient effort)  -MN      Static Sitting Balance PT STG, Assist Device UE Support  -MN      Static Sitting Balance PT STG, Additional Goal 20 minutes without LOB  -MN        User Key  (r) = Recorded By, (t) = Taken By, (c) = Cosigned By    Initials Name Provider Type    CHAPARRITA Garcia, PT Physical Therapist              Adult Rehabilitation Note       11/03/17 1120          Rehab Assessment/Intervention    Discipline physical therapy assistant  -AM      Document Type therapy note (daily note)  -AM      Subjective Information agree to therapy;no complaints  -AM      Patient Effort, Rehab Treatment poor  -AM      Symptoms Noted During/After Treatment none  -AM      Precautions/Limitations fall precautions;NPO;oxygen therapy device and L/min  -AM      Recorded by [AM] Victoriano Montanez PTA      Vital Signs    Pre  Systolic BP Rehab 160  -AM      Pre Treatment Diastolic BP 82  -AM      Post Systolic BP Rehab 154  -AM      Post Treatment Diastolic BP 70  -AM      Pretreatment Heart Rate (beats/min) 71  -AM      Posttreatment Heart Rate (beats/min) 68  -AM      Pre SpO2 (%) 100  -AM      O2 Delivery Pre Treatment supplemental O2  -AM      Post SpO2 (%) 99  -AM      O2 Delivery Post Treatment supplemental O2  -AM      Recorded by [AM] Victoriano Montanez PTA      Pain Assessment    Pain Assessment Unable to assess  -AM      Recorded by [AM] Victoriano Montanez PTA      Cognitive Assessment/Intervention    Current Cognitive/Communication Assessment impaired  -AM      Orientation Status unable/difficult to assess  -AM      Follows Commands/Answers Questions unable/difficult to assess  -AM      Recorded by [AM] Victoriano Montanez PTA      Communication Assessment/Intervention    Communication Assessment Expressive Aphasia  -AM      Recorded by [AM] Victoriano Montanez PTA      ROM (Range of Motion)    General ROM no range of motion deficits identified  -AM      Recorded by [AM] Victoriano Montanez PTA      Bed Mobility, Assessment/Treatment    Bed Mobility, Roll Left, [place] dependent (less than 25% patient effort);2 person assist required  -AM      Bed Mobility, Roll Right, [place] dependent (less than 25% patient effort);2 person assist required  -AM      Bed Mobility, Scoot/Bridge, [place] dependent (less than 25% patient effort);2 person assist required  -AM      Bed Mob, Supine to Sit, [place] not tested  -AM      Bed Mob, Sit to Supine, [place] not tested  -AM      Bed Mob, Sidelying to Sit, [place] not tested  -AM      Bed Mob, Sit to Sidelying, [place] not tested  -AM      Bed Mobility, Safety Issues cognitive deficits limit understanding;decreased use of arms for pushing/pulling;decreased use of legs for bridging/pushing  -AM      Bed Mobility, Impairments strength decreased;impaired  "balance;coordination impaired;motor control impaired;postural control impaired  -AM      Recorded by [AM] Victoriano Montanez PTA      Transfer Assessment/Treatment    Transfers, Bed-Chair [place] not tested  -AM      Transfers, Chair-Bed [place] not tested  -AM      Transfers, Sit-Stand [place] not tested  -AM      Transfers, Stand-Sit [place] not tested  -AM      Toilet Transfer, [place] not tested  -AM      Walk-In Shower Transfer, [place] not tested  -AM      Bathtub Transfer, [place] not tested  -AM      Recorded by [AM] Victoriano Montanez PTA      Gait Assessment/Treatment    Gait, [place] Level not tested  -AM      Recorded by [AM] Victoriano Montanez PTA      Stairs Assessment/Treatment    Stairs, [place] Level not tested  -AM      Recorded by [AM] Victoriano Montanez PTA      Positioning and Restraints    Pre-Treatment Position in bed  -AM      Post Treatment Position bed  -AM      In Bed supine;call light within reach;encouraged to call for assist;exit alarm on  -AM      Recorded by [AM] Victoriano Montanez PTA        User Key  (r) = Recorded By, (t) = Taken By, (c) = Cosigned By    Initials Name Effective Dates    AM Victoriano Montanez PTA 10/17/16 -           PT Recommendation and Plan  Anticipated Equipment Needs At Discharge:  (TBD)  Anticipated Discharge Disposition: long term acute care facility  Planned Therapy Interventions: balance training, bed mobility training, home exercise program, strengthening, stretching, transfer training, wheelchair management/propulsion training, postural re-education, neuromuscular re-education  PT Frequency: other (see comments) (5-14x/week)  Plan of Care Review  Plan Of Care Reviewed With: patient  Progress: unable to show any progress toward functional goals  Outcome Summary/Follow up Plan: Pt did not meet any PT goals this tx. Pt moaned \"Ooo\" the entire tx. Pt soiled in bed - required pericare and linen change. Pt dep for all mobiliyt. " Pt would benefit from LTACH once discharged.           Outcome Measures       11/03/17 1120 11/02/17 1400 11/02/17 1329    How much help from another person do you currently need...    Turning from your back to your side while in flat bed without using bedrails? 1  -AM  1  -MN    Moving from lying on back to sitting on the side of a flat bed without bedrails? 1  -AM  1  -MN    Moving to and from a bed to a chair (including a wheelchair)? 1  -AM  1  -MN    Standing up from a chair using your arms (e.g., wheelchair, bedside chair)? 1  -AM  1  -MN    Climbing 3-5 steps with a railing? 1  -AM  1  -MN    To walk in hospital room? 1  -AM  1  -MN    AM-PAC 6 Clicks Score 6  -AM  6  -MN    How much help from another is currently needed...    Putting on and taking off regular lower body clothing?  1  -ND     Bathing (including washing, rinsing, and drying)  1  -ND     Toileting (which includes using toilet bed pan or urinal)  1  -ND     Putting on and taking off regular upper body clothing  1  -ND     Taking care of personal grooming (such as brushing teeth)  1  -ND     Eating meals  1  -ND     Score  6  -ND     Functional Assessment    Outcome Measure Options AM-PAC 6 Clicks Basic Mobility (PT)  -AM AM-PAC 6 Clicks Daily Activity (OT)  -ND AM-PAC 6 Clicks Basic Mobility (PT)  -MN      User Key  (r) = Recorded By, (t) = Taken By, (c) = Cosigned By    Initials Name Provider Type    CHAPARRITA Garcia, PT Physical Therapist    GABINO Montanez PTA Physical Therapy Assistant    MUKUL Rocha, OTR/L Occupational Therapist           Time Calculation:         PT Charges       11/03/17 1120          Time Calculation    Start Time 1120  -AM      Stop Time 1145  -AM      Time Calculation (min) 25 min  -AM      PT Received On 11/03/17  -AM      PT - Next Appointment 11/04/17  -AM      Time Calculation- PT    Total Timed Code Minutes- PT 25 minute(s)  -AM        User Key  (r) = Recorded By, (t) = Taken By, (c) = Cosigned By     Initials Name Provider Type    AM Victoriano Montanez PTA Physical Therapy Assistant          Therapy Charges for Today     Code Description Service Date Service Provider Modifiers Qty    [id number] HC PT THER PROC EA 15 MIN 11/3/2017 Victoriano Montanez PTA GP, KX 2     Duration:  25m (11:20 AM - 11:45 AM)            PT G-Codes  PT Professional Judgement Used?: Yes  Outcome Measure Options: AM-PAC 6 Clicks Basic Mobility (PT)  Score: 6  Functional Limitation: Mobility: Walking and moving around  Mobility: Walking and Moving Around Current Status (): 100 percent impaired, limited or restricted  Mobility: Walking and Moving Around Goal Status (): At least 80 percent but less than 100 percent impaired, limited or restricted    PT Discharge Summary  Anticipated Discharge Disposition: long term acute care facility  Reason for Discharge: Discharge from facility, Per MD order  Outcomes Achieved: Unable to make functional progress toward goals at this time  Discharge Destination: LTACH    Victoriano Montanez PTA  11/3/2017

## 2017-11-03 NOTE — DISCHARGE SUMMARY
[facility]  DISCHARGE SUMMARY       Date of Admission: 10/26/2017  Date of Discharge:  11/3/2017  Primary Care Physician: GUSTAVO Aguilar        Final Discharge Diagnoses:  Acute on chronic respiratory failure with hypoxia and hypercapnia    Acute renal failure (ARF)    Hypokalemia( resolved)    GI bleeding    Decompensated COPD with exacerbation (chronic obstructive pulmonary disease)    Chronic pain    Cachexia    Anemia of acute blood loss   Infective endocarditis   Septic shock      Left sided weakness   Old strokes on CT of brain   Sinus bradycardia   Hepatitis C  Hepatitis B  Thrombocytopenia   Bilateral pneumonia     Consults:   Consults     Date and Time Order Name Status Description    10/30/2017 1848 Inpatient Consult to Cardiology      10/30/2017 1134 Inpatient Consult to Cardiothoracic Surgery      10/28/2017 1652 Inpatient Consult to Gastroenterology Completed     10/27/2017 0225 Inpatient Consult to Nephrology Completed     10/27/2017 0028 Hospitalist (on-call MD unless specified)            Procedures Performed:   DESCRIPTION OF OPERATION: In CCU, patient placed in supine position, prepped and draped in sterile fashion.  Vascular ultrasound was used to identify the right common femoral vein which was 10mm in diameter and patent.  Cannulated via modified Seldinger technique with mini stick under ultrasound guidance.  Exchanged for a 0.035 guidewire and serial dilators.  A 13Fr Trialysis catheter was placed, aspirated and flushed without difficulty with Hep-Lock solution.  1000U/mL heparin instilled into lumens. catheter secured to the skin with 2-0 nylon suture.  Sterile dressing applied. patient tolerated procedure well. dialysis notified              Pertinent Test Results:     CT Head :  Bone windows are unremarkable.  The visualized paranasal sinuses are unremarkable.  Endotracheal tube and nasogastric tube have been  removed.     Again images significantly limited by streak artifact from metal  plate covering right occipital parietal craniotomy defect  No hemorrhage.  No mass.  Again old left thalamic lacunar infarct and stable area of  ischemia right basal ganglia.  No abnormal areas of increased attenuation.  No midline shift.  No abnormal extra-axial fluid collections.     Us renal   CONCLUSION: Diffuse increased renal cortical echogenicity in both  kidneys often seen in varying degrees of acute or chronic renal  insufficiency. No evidence a masses, calculi or obstruction.  Trace amount of free fluid between the left kidney and spleen.  Renal ultrasound is otherwise remarkable  Echocardiogram :  · The left ventricular cavity is borderline dilated.  · Left ventricular wall thickness is consistent with mild concentric hypertrophy.  · Left ventricular systolic function is normal. Estimated EF = 63%.  · Left atrial cavity size is mildly dilated.  · There is bileaflet mitral valve prolapse present.  · Moderate mitral valve regurgitation is present  · Moderate tricuspid valve regurgitation is present.  · Mild pulmonic valve regurgitation is present.  · There are myxomatous changes of the mitral valve apparatus present. There is bileaflet mitral valve prolapse present. Posterior more than anterior. A moderate size mobile mitral valve mass is present on the posterior leaflet .Moderate mitral valve regurgitation is present  · Consider Infective Endocarditis of the mitral valve      Chief Complaint on Day of Discharge: None     Hospital Course:Patient is a 62 y.o. male admitted for altered mental status, significant respiratory distress, hypoxia.     According to the family, the patient did not feel to good lately, has been coughing and complaining of worsening shortness of breath over the last several days. His appetite, usually poor, has been even poorer. He might have been complaining of episodes of melena. He lost even more weight  "over the last several months. In general, his overall condition had declined at an ever faster rate.  Patient was intubated sedated  Started on levophed for hislow blood pressure . He was put on iv antibiotics .  His echocardiogram showed a vegetation on the mitral valve  . His blood cultures grew entrerococus . We switched th antibiotics to ampicillin and ceftriaxone after we got the sensitivities .  The repeat  Cultures are negative so far .   He remains a febrile   We were able to wean him faily quickly  Off his pressors   He self extubated himself  ,he is not dyspneic on room air .   We did order a swallow evaluation he failed it . He does have a left sided hemiplegia a ct of his brain was done  Twice and showed old strokes bilaterally . Members of his family stated that his hemiplegia was old but his daughter is saying it is new . Both CT of the brain  Showed the same thing . We wont be able to order an MRI due to a metallic plate that he has in his brain .   He did have a low hemoglobin on arrival with a history of melena he was transfused two units of PRBC ,his hemoglobin remained stable . DR head evaluated him he would need a work up once more stable .   His creatinine was very high on admissions  He needed RRT ,it improved  after dialysis ,Dr Mcmanus is following him   He failed his swallow evaluation  He is still on tube feeds at this point .   He did have episode of bradycardia but he did not need any pacing           Condition on Discharge:  Fair     Physical Exam on Discharge:  /86  Pulse 71  Temp 98 °F (36.7 °C) (Temporal Artery )   Resp 14  Ht 67\" (170.2 cm)  Wt 137 lb (62.1 kg)  SpO2 98%  BMI 21.46 kg/m2  Physical Exam   Constitutional: He is oriented to person, place, and time. He appears well-developed and well-nourished.   HENT:   Head: Normocephalic.   Eyes: EOM are normal. Pupils are equal, round, and reactive to light.   Neck: Normal range of motion. Neck supple. No JVD present. " Tracheal deviation present. No thyromegaly present.   Cardiovascular: Regular rhythm, normal heart sounds and intact distal pulses.  Exam reveals no gallop and no friction rub.    No murmur heard.  Pulmonary/Chest: Effort normal and breath sounds normal. No respiratory distress. He has no wheezes. He has no rales. He exhibits no tenderness.   Abdominal: Soft. Bowel sounds are normal. He exhibits distension. There is no tenderness. There is no rebound and no guarding.   Musculoskeletal:   Expressive aphasia   Left sided hemiplegia    Neurological: He is alert and oriented to person, place, and time. A cranial nerve deficit is present.   Skin: Skin is warm and dry.         Discharge Disposition:      Discharge Medications:   Héctor Koo   Home Medication Instructions [place]:[id number]    Printed on:11/03/17 1924   Medication Information                      albuterol (PROVENTIL) (2.5 MG/3ML) 0.083% nebulizer solution  Take 2.5 mg by nebulization 4 (Four) Times a Day.             aspirin 81 MG chewable tablet  Chew 1 tablet Daily.             budesonide-formoterol (SYMBICORT) 160-4.5 MCG/ACT inhaler  Inhale 2 puffs 2 (Two) Times a Day.             cefTRIAXone (ROCEPHIN) 1 g/100 mL 0.9% NS (MBP)  Infuse 100 mL into a venous catheter Daily. Indications: Pneumonia             gabapentin (NEURONTIN) 600 MG tablet  Take 600 mg by mouth 3 (Three) Times a Day.             guaifenesin-dextromethorphan (ROBITUSSIN DM) 100-10 MG/5ML syrup  Take 5 mL by mouth 4 (Four) Times a Day As Needed for Cough.             lactulose (CHRONULAC) 10 GM/15ML solution  Take 15 mL by mouth 3 (Three) Times a Day.             LORazepam (ATIVAN) 1 MG tablet  Take 1 tablet by mouth Every 6 (Six) Hours As Needed for Anxiety.             methylPREDNISolone sodium succinate (SOLU-Medrol) 125 MG injection  Infuse 0.96 mL into a venous catheter Every 6 (Six) Hours.             nicotine (NICODERM CQ) 21 MG/24HR patch  Place 1 patch on the skin  Daily.             oxyCODONE-acetaminophen (PERCOCET)  MG per tablet  Take 1 tablet by mouth Every 4 (Four) Hours As Needed for Moderate Pain (4-6).             sodium chloride 0.9 % flush  Infuse 1-10 mL into a venous catheter As Needed for Line Care.             sodium chloride 0.9 % flush  Infuse 10 mL into a venous catheter As Needed for Line Care.                 Discharge Diet: tube feeds     Activity at Discharge: as tolerated     Discharge Care Plan/Instructions:     Follow-up Appointments:   No future appointments.    Test Results Pending at Discharge:    Order Current Status    Non-gynecologic Cytology - Body Fluid, Bronchus Collected (10/29/17 1817)    Yeast Culture - Reference - Swab, Throat Collected (10/29/17 1817)    Extra Tubes In process    Fungus Culture - Swab, Throat In process    Hepatitis B E Antigen In process    Blood Culture - Blood, Preliminary result    Blood Culture - Blood, Preliminary result          Katie Chambers MD  11/03/17  3:31 PM    Time: 40 min

## 2017-11-03 NOTE — PLAN OF CARE
Problem: Patient Care Overview (Adult)  Goal: Plan of Care Review  Outcome: Ongoing (interventions implemented as appropriate)    11/03/17 1550   Outcome Evaluation   Outcome Summary/Follow up Plan Swallow evaluation completed; continue NPO and will re-assess swallow in a.m. Pt exhibits right side labial weakness and decreased bolus and control of secretions.    Coping/Psychosocial Response Interventions   Plan Of Care Reviewed With patient   Patient Care Overview   Progress no change         Problem: Inpatient SLP  Goal: Dysphagia- Patient will improve swallowing skills to begin to take some PO safely  Outcome: Ongoing (interventions implemented as appropriate)    11/03/17 1550   Begin to Take Some PO Safely   Begin to Take Some PO Safely- SLP, Date Established 11/03/17   Begin to Take Some PO Safely- SLP, Activity Level Patient will improve oral skills for more efficient eating;Patient will improve timing of pharyngeal response for safer, more efficient swallow   Begin to Take Some PO Safely- SLP, Additional Goal Pt to tolerate po trials with no s/s of aspiration.

## 2017-11-03 NOTE — NURSING NOTE
Medina (patients daughter) gave permission for the patient to go to [facility]. Dr. Chambers was made aware of the patients daughter's wishes. Currently waiting for approval from Dr. Dumas for admission to [facility]. The patients sister Louise is also aware of the pending transfer and all parties are in agreement.

## 2017-11-03 NOTE — PROGRESS NOTES
"[facility]  [address] [place] [address]. [id number]  T - [phone]  F - [phone]     Progress Note          PATIENT  DEMOGRAPHICS   PATIENT NAME: Héctor Koo                      PHYSICIAN: James Mcmanus MD  : 1955  MRN: [id number]   LOS: 7 days    Patient Care Team:  GUSTAVO Aguilar as PCP - General (Family Medicine)  Subjective   SUBJECTIVE   Still drowsy        Objective   OBJECTIVE   Vital Signs  Temp:  [96.9 °F (36.1 °C)-98.9 °F (37.2 °C)] 97.8 °F (36.6 °C)  Heart Rate:  [54-87] 84  Resp:  [12-20] 12  BP: (102-166)/() 135/77    Flowsheet Rows         First Filed Value    Admission Height  67\" (170.2 cm) Documented at 10/27/2017 0040    Admission Weight  92 lb (41.7 kg) Documented at 10/27/2017 0040           I/O last 3 completed shifts:  In: 3039.2 [I.V.:2179.2; Other:60; IV Piggyback:800]  Out: 1800 [Urine:800; Other:1000]    PHYSICAL EXAM    Physical Exam   Constitutional: He appears well-developed.   Poorly nourished   HENT:   Head: Normocephalic and atraumatic.   Eyes: Pupils are equal, round, and reactive to light. Right eye exhibits no discharge. Left eye exhibits no discharge. No scleral icterus.   Cardiovascular: Normal rate, regular rhythm and normal heart sounds.  Exam reveals no gallop and no friction rub.    No murmur heard.  Pulmonary/Chest: Effort normal. He has wheezes.   Abdominal: Soft. Bowel sounds are normal. He exhibits no distension and no mass. There is no tenderness. There is no guarding.   Musculoskeletal: He exhibits no edema or tenderness.   Neurological: He exhibits abnormal muscle tone.   Left sided weakness   Skin: Skin is warm and dry.   Nursing note and vitals reviewed.      RESULTS   Results Review:      Results from last 7 days  Lab Units 17  0728 17  0615 10/31/17  2133  10/31/17  0327   SODIUM mmol/L 142 139  --   --  138   SODIUM, ARTERIAL mmol/L  --   --  135.1*  < >  --    POTASSIUM mmol/L 3.4* " 3.5  --   --  3.5   CHLORIDE mmol/L 105 106  --   --  106   CO2 mmol/L 23.0 24.0  --   --  21.0*   BUN mg/dL 42* 39*  --   --  59*   CREATININE mg/dL 3.32* 2.65*  --   --  3.89*   CALCIUM mg/dL 8.0* 7.6*  --   --  6.7*   BILIRUBIN mg/dL 0.5 0.5  --   --  0.2   ALK PHOS U/L 59 50  --   --  41   ALT (SGPT) U/L 34 25  --   --  29   AST (SGOT) U/L 39 35  --   --  32   GLUCOSE mg/dL 137* 144*  --   --  141*   GLUCOSE, ARTERIAL mmol/L  --   --  165  < >  --    < > = values in this interval not displayed.    Estimated Creatinine Clearance: 20.8 mL/min (by C-G formula based on Cr of 3.32).      Results from last 7 days  Lab Units 11/02/17 0553 11/01/17  0615 10/31/17  0327   MAGNESIUM mg/dL 1.9 1.8 1.8   PHOSPHORUS mg/dL  --  5.5* 7.1*       Results from last 7 days  Lab Units 11/02/17  0553 11/01/17  0615 10/31/17  0327 10/30/17  0420 10/29/17  0456   WBC 10*3/mm3 13.13* 9.59 7.88 10.25* 11.39*   HEMOGLOBIN g/dL 8.8* 8.0* 7.8* 8.8* 8.2*   PLATELETS 10*3/mm3 122* 100* 97* 111* 121*               Imaging Results (last 24 hours)     Procedure Component Value Units Date/Time    IR PICC wo fluoro guidance [[id number]] Resulted:  11/02/17 1544     Updated:  11/02/17 1544    Narrative:       This procedure was auto-finalized with no dictation required.    US Guided Vascular Access [[id number]] Resulted:  11/02/17 1604     Updated:  11/02/17 1604    Narrative:       This procedure was auto-finalized with no dictation required.    XR Chest Post CVA Port [[id number]] Collected:  11/02/17 1557     Updated:  11/02/17 1621    Narrative:       PROCEDURE: AP upright portable chest at 4:00 PM.    INDICATION: PICC insertion.    COMPARISON: 11/2/2017 exam earlier the same date at 5:12 AM and  multiple earlier exams..    Interval insertion of a left-sided PICC with tip mid SVC.    There also has been insertion of an NG tube tip in stomach.    Heart size normal with normal vascularity. Chronic fibrotic  changes. Chronic bilateral apical pleural  thickening left greater  than right. No pleural effusions.      Impression:       Left PICC tip SVC.    NG tube tip in stomach.    Chronic pulmonary fibrotic changes and chronic bilateral left  greater than right apical pleural thickening.    [id number]    Electronically signed by:  Grabiel Adams MD  11/2/2017 4:20  PM CDT Workstation: [id number]           MEDICATIONS      albuterol 2.5 mg Nebulization Q6H - RT   ampicillin 2 g Intravenous Q8H   ceftriaxone 2 g Intravenous Q12H   chlorhexidine 15 mL Mouth/Throat Q12H   famotidine 10 mg Intravenous BID   methylPREDNISolone sodium succinate 80 mg Intravenous Q6H   sodium chloride 500 mL Intravenous Once   sodium chloride 10 mL Intracatheter Q12H       midazolam 1 mg/hr Last Rate: Stopped (10/31/17 0545)   norepinephrine 0.02-0.3 mcg/kg/min Last Rate: Stopped (10/27/17 0814)   sodium chloride 50 mL/hr Last Rate: 50 mL/hr (11/02/17 0220)       Assessment/Plan   ASSESSMENT / PLAN    Principal Problem:    Pneumonia of both lungs due to infectious organism  Active Problems:    Decompensated COPD with exacerbation (chronic obstructive pulmonary disease)    Chronic pain    Acute on chronic respiratory failure with hypoxia and hypercapnia    Acute renal failure (ARF)    Hypokalemia    Cachexia    Anemia    GI bleeding      1. Acute kidney injury: Baseline creatinine 0.5-0.6 mg/dl.   - Etiology of ALLAN is pre-renal from decreased PO intake, sepsis leading to ATN.   - Admitted with a creatinine of 7.2 mg/dl. No change in cr and therefore started hd 10/30/17. Will stop ivf and keep tube feeding. Hd sat and next week m/w/f. may need tunnel cath if hd dependant. Family doesn't want transfer to LTAC, likes to transfer with neuro coverage    2. Severe sepsis with endocarditis:  - Blood cultures positive for Enterococcus. Echo showed mitral valve vegetation, and possible septic emboli with left sided weakness. repeat culture NGSF  - PCN sensitive and now on ampicillin for 6 weeks.  Has moraxella in sputum and is on ceftriaxone. Ct negative for infarct    3. Anemia:   - ? Blood loss with h/o melena. No plans for scope at this time per GI.  - s/p 2 units of pRBC transfusion, check fe studies    4. Chronic hepatitis C and hepatitis b             This document has been electronically signed by James Mcmanus MD on November 3, 2017 8:36 AM

## 2017-11-03 NOTE — THERAPY TREATMENT NOTE
Acute Care - Physical Therapy Treatment Note  [facility]     Patient Name: Héctor Koo  : 1955  MRN: [id number]  Today's Date: 11/3/2017  Onset of Illness/Injury or Date of Surgery Date: 10/26/17  Date of Referral to PT: 17  Referring Physician: Dr. Chambers    Admit Date: 10/26/2017    Visit Dx:    ICD-10-CM ICD-9-CM   1. Pneumonia of both lungs due to infectious organism, unspecified part of lung J18.9 483.8   2. Type I respiratory failure J96.90 518.81   3. Leukocytosis, unspecified type D72.829 288.60   4. Hypokalemia E87.6 276.8   5. Acute renal failure, unspecified acute renal failure type N17.9 584.9   6. Dehydration E86.0 276.51   7. Elevated troponin R74.8 790.6   8. Altered mental status, unspecified altered mental status type R41.82 780.97   9. Chronic hepatitis C without hepatic coma B18.2 070.54   10. Uremia N19 586   11. Anemia, unspecified type D64.9 285.9   12. Thrombocytopenia D69.6 287.5   13. Impaired functional mobility, balance, gait, and endurance Z74.09 V49.89   14. Decreased activities of daily living (ADL) Z78.9 V49.89     Patient Active Problem List   Diagnosis   • Decompensated COPD with exacerbation (chronic obstructive pulmonary disease)   • Chronic pain   • Acute on chronic respiratory failure with hypoxia and hypercapnia   • Pneumonia of both lungs due to infectious organism   • Atherosclerosis   • Acute renal failure (ARF)   • Hypokalemia   • Cachexia   • Anemia   • GI bleeding               Adult Rehabilitation Note       17 1120          Rehab Assessment/Intervention    Discipline physical therapy assistant  -AM      Document Type therapy note (daily note)  -AM      Subjective Information agree to therapy;no complaints  -AM      Patient Effort, Rehab Treatment poor  -AM      Symptoms Noted During/After Treatment none  -AM      Precautions/Limitations fall precautions;NPO;oxygen therapy device and L/min  -AM      Recorded by [AM] Victoriano Montanez, PTA       Vital Signs    Pre Systolic BP Rehab 160  -AM      Pre Treatment Diastolic BP 82  -AM      Post Systolic BP Rehab 154  -AM      Post Treatment Diastolic BP 70  -AM      Pretreatment Heart Rate (beats/min) 71  -AM      Posttreatment Heart Rate (beats/min) 68  -AM      Pre SpO2 (%) 100  -AM      O2 Delivery Pre Treatment supplemental O2  -AM      Post SpO2 (%) 99  -AM      O2 Delivery Post Treatment supplemental O2  -AM      Recorded by [AM] Victoriano Montanez PTA      Pain Assessment    Pain Assessment Unable to assess  -AM      Recorded by [AM] Victoriano Montanez PTA      Cognitive Assessment/Intervention    Current Cognitive/Communication Assessment impaired  -AM      Orientation Status unable/difficult to assess  -AM      Follows Commands/Answers Questions unable/difficult to assess  -AM      Recorded by [AM] Victoriano Montanez PTA      Communication Assessment/Intervention    Communication Assessment Expressive Aphasia  -AM      Recorded by [AM] Victoriano Montanez PTA      ROM (Range of Motion)    General ROM no range of motion deficits identified  -AM      Recorded by [AM] Victoriano Montanez PTA      Bed Mobility, Assessment/Treatment    Bed Mobility, Roll Left, [place] dependent (less than 25% patient effort);2 person assist required  -AM      Bed Mobility, Roll Right, [place] dependent (less than 25% patient effort);2 person assist required  -AM      Bed Mobility, Scoot/Bridge, [place] dependent (less than 25% patient effort);2 person assist required  -AM      Bed Mob, Supine to Sit, [place] not tested  -AM      Bed Mob, Sit to Supine, [place] not tested  -AM      Bed Mob, Sidelying to Sit, [place] not tested  -AM      Bed Mob, Sit to Sidelying, [place] not tested  -AM      Bed Mobility, Safety Issues cognitive deficits limit understanding;decreased use of arms for pushing/pulling;decreased use of legs for bridging/pushing  -AM      Bed Mobility, Impairments strength  decreased;impaired balance;coordination impaired;motor control impaired;postural control impaired  -AM      Recorded by [AM] Victoriano Montanez PTA      Transfer Assessment/Treatment    Transfers, Bed-Chair [place] not tested  -AM      Transfers, Chair-Bed [place] not tested  -AM      Transfers, Sit-Stand [place] not tested  -AM      Transfers, Stand-Sit [place] not tested  -AM      Toilet Transfer, [place] not tested  -AM      Walk-In Shower Transfer, [place] not tested  -AM      Bathtub Transfer, [place] not tested  -AM      Recorded by [AM] Victoriano Montanez PTA      Gait Assessment/Treatment    Gait, [place] Level not tested  -AM      Recorded by [AM] Victoriano Montanez PTA      Stairs Assessment/Treatment    Stairs, [place] Level not tested  -AM      Recorded by [AM] Victoriano Montanez PTA      Positioning and Restraints    Pre-Treatment Position in bed  -AM      Post Treatment Position bed  -AM      In Bed supine;call light within reach;encouraged to call for assist;exit alarm on  -AM      Recorded by [AM] Victoriano Montanez PTA        User Key  (r) = Recorded By, (t) = Taken By, (c) = Cosigned By    Initials Name Effective Dates    AM Victoriano Montanez PTA 10/17/16 -                 IP PT Goals       11/02/17 1433          Bed Mobility PT STG    Bed Mobility PT STG, Date Established 11/02/17  -MN      Bed Mobility PT STG, Time to Achieve 1 wk  -MN      Bed Mobility PT STG, Activity Type roll left/roll right;supine to sit/sit to supine  -MN      Bed Mobility PT STG, [place] Level moderate assist (50% patient effort)  -MN      Transfer Training Goal, Assist Device bed rails  -MN      Transfer Training PT STG    Transfer Training PT STG, Date Established 11/02/17  -MN      Transfer Training PT STG, Time to Achieve 1 wk  -MN      Transfer Training PT STG, Activity Type bed to chair /chair to bed;toilet  -MN      Transfer Training PT STG, [place] Level maximum assist  (25% patient effort)  -MN      Transfer Training PT STG, Assist Device --   via scoot/squat pivot transfer  -MN      Static Sitting Balance PT STG    Static Sitting Balance PT STG, Date Established 11/02/17  -MN      Static Sitting Balance PT STG, Time to Achieve 2 wks  -MN      Static Sitting Balance PT STG, [place] Level moderate assist (50% patient effort)  -MN      Static Sitting Balance PT STG, Assist Device UE Support  -MN      Static Sitting Balance PT STG, Additional Goal 20 minutes without LOB  -MN        User Key  (r) = Recorded By, (t) = Taken By, (c) = Cosigned By    Initials Name Provider Type    MN Norah Garcia, PT Physical Therapist          Physical Therapy Education     Title: PT OT SLP Therapies (Active)     Topic: Physical Therapy (Active)     Point: Mobility training (Active)    Learning Progress Summary    Learner Readiness Method Response Comment Documented by Status   Patient Nonacceptance E NR importance of mobility MN 11/02/17 1436 Active                      User Key     Initials Effective Dates Name Provider Type Discipline    MN 10/17/16 -  Norah Garcia PT Physical Therapist PT                    PT Recommendation and Plan  Anticipated Equipment Needs At Discharge:  (TBD)  Anticipated Discharge Disposition: long term acute care facility  Planned Therapy Interventions: balance training, bed mobility training, home exercise program, strengthening, stretching, transfer training, wheelchair management/propulsion training, postural re-education, neuromuscular re-education  PT Frequency: other (see comments) (5-14x/week)             Outcome Measures       11/03/17 1120 11/02/17 1400 11/02/17 1329    How much help from another person do you currently need...    Turning from your back to your side while in flat bed without using bedrails? 1  -AM  1  -MN    Moving from lying on back to sitting on the side of a flat bed without bedrails? 1  -AM  1  -MN    Moving to and from a bed to a  chair (including a wheelchair)? 1  -AM  1  -MN    Standing up from a chair using your arms (e.g., wheelchair, bedside chair)? 1  -AM  1  -MN    Climbing 3-5 steps with a railing? 1  -AM  1  -MN    To walk in hospital room? 1  -AM  1  -MN    AM-PAC 6 Clicks Score 6  -AM  6  -MN    How much help from another is currently needed...    Putting on and taking off regular lower body clothing?  1  -ND     Bathing (including washing, rinsing, and drying)  1  -ND     Toileting (which includes using toilet bed pan or urinal)  1  -ND     Putting on and taking off regular upper body clothing  1  -ND     Taking care of personal grooming (such as brushing teeth)  1  -ND     Eating meals  1  -ND     Score  6  -ND     Functional Assessment    Outcome Measure Options AM-PAC 6 Clicks Basic Mobility (PT)  -AM AM-PAC 6 Clicks Daily Activity (OT)  -ND AM-PAC 6 Clicks Basic Mobility (PT)  -MN      User Key  (r) = Recorded By, (t) = Taken By, (c) = Cosigned By    Initials Name Provider Type    CHAPARRITA Garcia, PT Physical Therapist    AM Victoriano Montanez PTA Physical Therapy Assistant    ND Samantha Rocha, OTR/L Occupational Therapist           Time Calculation:         PT Charges       11/03/17 1120          Time Calculation    Start Time 1120  -AM      Stop Time 1145  -AM      Time Calculation (min) 25 min  -AM      PT Received On 11/03/17  -AM      PT - Next Appointment 11/04/17  -AM      Time Calculation- PT    Total Timed Code Minutes- PT 25 minute(s)  -AM        User Key  (r) = Recorded By, (t) = Taken By, (c) = Cosigned By    Initials Name Provider Type    AM Victoriano Montanez PTA Physical Therapy Assistant          Therapy Charges for Today     Code Description Service Date Service Provider Modifiers Qty    51504848820 HC PT THER PROC EA 15 MIN 11/3/2017 Victoriano Montanez PTA GP, KX 2     Duration:  25m (11:20 AM - 11:45 AM)            PT G-Codes  PT Professional Judgement Used?: Yes  Outcome Measure Options: AM-PAC 6 Clicks  Basic Mobility (PT)  Score: 6  Functional Limitation: Mobility: Walking and moving around  Mobility: Walking and Moving Around Current Status (): 100 percent impaired, limited or restricted  Mobility: Walking and Moving Around Goal Status (): At least 80 percent but less than 100 percent impaired, limited or restricted    Victoriano Montanez, PTA  11/3/2017

## 2017-11-03 NOTE — NURSING NOTE
Pt's daughter Medina has called and has changed her mind on LTACH transfer. She stated she would like a facility that has a neurologist. I expressed I will relay the message to the oncoming nurse and let the charge nurse know to express to the MD tomorrow.

## 2017-11-04 ENCOUNTER — APPOINTMENT (OUTPATIENT)
Dept: GENERAL RADIOLOGY | Facility: HOSPITAL | Age: 62
End: 2017-11-04

## 2017-11-04 LAB
ALBUMIN SERPL-MCNC: 2.6 G/DL (ref 3.4–4.8)
ANION GAP SERPL CALCULATED.3IONS-SCNC: 11 MMOL/L (ref 5–15)
BACTERIA SPEC AEROBE CULT: NORMAL
BACTERIA SPEC AEROBE CULT: NORMAL
BUN BLD-MCNC: 37 MG/DL (ref 7–21)
BUN/CREAT SERPL: 13.7 (ref 7–25)
CALCIUM SPEC-SCNC: 8 MG/DL (ref 8.4–10.2)
CHLORIDE SERPL-SCNC: 104 MMOL/L (ref 95–110)
CO2 SERPL-SCNC: 27 MMOL/L (ref 22–31)
CREAT BLD-MCNC: 2.7 MG/DL (ref 0.7–1.3)
GFR SERPL CREATININE-BSD FRML MDRD: 24 ML/MIN/1.73 (ref 49–113)
GLUCOSE BLD-MCNC: 149 MG/DL (ref 60–100)
PHOSPHATE SERPL-MCNC: 4.1 MG/DL (ref 2.4–4.4)
POTASSIUM BLD-SCNC: 3.3 MMOL/L (ref 3.5–5.1)
SODIUM BLD-SCNC: 142 MMOL/L (ref 137–145)

## 2017-11-04 PROCEDURE — 97162 PT EVAL MOD COMPLEX 30 MIN: CPT

## 2017-11-04 PROCEDURE — 71010 HC CHEST PA OR AP: CPT

## 2017-11-04 PROCEDURE — 25010000002 METHYLPREDNISOLONE PER 125 MG: Performed by: INTERNAL MEDICINE

## 2017-11-04 PROCEDURE — 25010000002 CEFTRIAXONE PER 250 MG: Performed by: INTERNAL MEDICINE

## 2017-11-04 PROCEDURE — 25010000002 CEFTRIAXONE: Performed by: INTERNAL MEDICINE

## 2017-11-04 PROCEDURE — 25010000002 LORAZEPAM PER 2 MG: Performed by: NURSE PRACTITIONER

## 2017-11-04 PROCEDURE — 97166 OT EVAL MOD COMPLEX 45 MIN: CPT

## 2017-11-04 PROCEDURE — 25010000003 POTASSIUM CHLORIDE 10 MEQ/100ML SOLUTION: Performed by: INTERNAL MEDICINE

## 2017-11-04 PROCEDURE — 97112 NEUROMUSCULAR REEDUCATION: CPT

## 2017-11-04 PROCEDURE — 80069 RENAL FUNCTION PANEL: CPT | Performed by: INTERNAL MEDICINE

## 2017-11-04 PROCEDURE — 97530 THERAPEUTIC ACTIVITIES: CPT

## 2017-11-04 RX ORDER — POTASSIUM CHLORIDE 7.45 MG/ML
10 INJECTION INTRAVENOUS
Status: DISPENSED | OUTPATIENT
Start: 2017-11-04 | End: 2017-11-04

## 2017-11-04 NOTE — THERAPY DISCHARGE NOTE
Acute Care - Occupational Therapy Discharge Summary  [facility]     Patient Name: Héctor Koo  : 1955  MRN: [id number]    Today's Date: 2017  Onset of Illness/Injury or Date of Surgery Date: 10/26/17    Date of Referral to OT: 17  Referring Physician: Dr. Chambers      Admit Date: 10/26/2017        OT Recommendation and Plan    Visit Dx:    ICD-10-CM ICD-9-CM   1. Pneumonia of both lungs due to infectious organism, unspecified part of lung J18.9 483.8   2. Type I respiratory failure J96.90 518.81   3. Leukocytosis, unspecified type D72.829 288.60   4. Hypokalemia E87.6 276.8   5. Acute renal failure, unspecified acute renal failure type N17.9 584.9   6. Dehydration E86.0 276.51   7. Elevated troponin R74.8 790.6   8. Altered mental status, unspecified altered mental status type R41.82 780.97   9. Chronic hepatitis C without hepatic coma B18.2 070.54   10. Uremia N19 586   11. Anemia, unspecified type D64.9 285.9   12. Thrombocytopenia D69.6 287.5   13. Impaired functional mobility, balance, gait, and endurance Z74.09 V49.89   14. Decreased activities of daily living (ADL) Z78.9 V49.89   15. Oropharyngeal dysphagia R13.12 787.22               Time Calculation- OT       17 1635          Time Calculation- OT    OT Start Time 1411  -RW      OT Stop Time 1455  -RW      OT Time Calculation (min) 44 min  -RW      Total Timed Code Minutes- OT 8 minute(s)  -RW      OT Received On 17  -      OT Goal Re-Cert Due Date 17  -        User Key  (r) = Recorded By, (t) = Taken By, (c) = Cosigned By    Initials Name Provider Type    LAWRENCE Foley OTR/L Occupational Therapist                  OT Goals       17 1710 17 1437       Bed Mobility OT LTG    Bed Mobility OT LTG, Date Established  17  -ND     Bed Mobility OT LTG, Time to Achieve  by discharge  -ND     Bed Mobility OT LTG, Activity Type  all bed mobility  -ND     Bed Mobility OT LTG, [place]  Level  moderate assist (50% patient effort)  -ND     Bed Mobility OT LTG, Date Goal Reviewed 11/04/17  -RW      Bed Mobility OT LTG, Outcome goal not met  -RW      Bed Mobility OT LTG, Reason Goal Not Met discharged from facility  -RW      Strength OT LTG    Strength Goal OT LTG, Date Established  11/02/17  -ND     Strength Goal OT LTG, Time to Achieve  by discharge  -ND     Strength Goal OT LTG, Measure to Achieve  Pt. will complete BUE strengthening exercises all planes and joints to increase BUE strength for ADLs.   -ND     Strength Goal OT LTG, Date Goal Reviewed 11/04/17  -RW      Strength Goal OT LTG, Outcome goal not met  -RW      Strength Goal OT LTG, Reason Goal Not Met discharged from facility  -RW      Static Sitting Balance OT LTG    Static Sitting Balance OT LTG, Date Established  11/02/17  -ND     Static Sitting Balance OT LTG, Time to Achieve  by discharge  -ND     Static Sitting Balance OT LTG, [place] Level  minimum assist (75% patient effort)  -ND     Static Sitting Balance OT LTG, Assist Device  UE Support  -ND     Static Sitting Balance OT LTG, Date Goal Reviewed 11/04/17  -RW      Static Sitting Balance OT LTG, Outcome goal not met  -RW      Static Sitting Balance OT LTG, Reason Goal Not Met discharged from facility  -RW      Grooming OT LTG    Grooming Goal OT LTG, Date Established  11/02/17  -ND     Grooming Goal OT LTG, Time to Achieve  by discharge  -ND     Grooming Goal OT LTG, [place] Level  moderate assist (50% patient effort)  -ND     Grooming Goal OT LTG, Position  sitting, edge of bed;sitting in chair  -ND     Grooming Goal OT LTG, Date Goal Reviewed 11/04/17  -RW      Grooming Goal OT LTG, Outcome goal not met  -RW      Grooming Goal OT LTG, Reason Goal Not Met discharged from facility  -RW        User Key  (r) = Recorded By, (t) = Taken By, (c) = Cosigned By    Initials Name Provider Type    LAWRENCE Foley, OTR/L Occupational Therapist    MUKUL Rocha, OTR/L  Occupational Therapist                Outcome Measures       11/03/17 1120 11/02/17 1400 11/02/17 1329    How much help from another person do you currently need...    Turning from your back to your side while in flat bed without using bedrails? 1  -AM  1  -MN    Moving from lying on back to sitting on the side of a flat bed without bedrails? 1  -AM  1  -MN    Moving to and from a bed to a chair (including a wheelchair)? 1  -AM  1  -MN    Standing up from a chair using your arms (e.g., wheelchair, bedside chair)? 1  -AM  1  -MN    Climbing 3-5 steps with a railing? 1  -AM  1  -MN    To walk in hospital room? 1  -AM  1  -MN    AM-PAC 6 Clicks Score 6  -AM  6  -MN    How much help from another is currently needed...    Putting on and taking off regular lower body clothing?  1  -ND     Bathing (including washing, rinsing, and drying)  1  -ND     Toileting (which includes using toilet bed pan or urinal)  1  -ND     Putting on and taking off regular upper body clothing  1  -ND     Taking care of personal grooming (such as brushing teeth)  1  -ND     Eating meals  1  -ND     Score  6  -ND     Functional Assessment    Outcome Measure Options AM-PAC 6 Clicks Basic Mobility (PT)  -AM AM-PAC 6 Clicks Daily Activity (OT)  -ND AM-PAC 6 Clicks Basic Mobility (PT)  -MN      User Key  (r) = Recorded By, (t) = Taken By, (c) = Cosigned By    Initials Name Provider Type    CHAPARRITA Garcia, PT Physical Therapist    GABINO Montanez PTA Physical Therapy Assistant    MUKUL Rocha, OTR/L Occupational Therapist              OT Discharge Summary  Anticipated Discharge Disposition: skilled nursing facility, long term acute care facility  Reason for Discharge: Discharge from facility  Outcomes Achieved: Refer to plan of care for updates on goals achieved  Discharge Destination: TONY Foley, OTR/L  11/4/2017

## 2017-11-04 NOTE — PLAN OF CARE
Problem: Inpatient Occupational Therapy  Goal: Bed Mobility Goal LTG- OT  Outcome: Unable to achieve outcome(s) by discharge Date Met:  11/04/17 11/02/17 1437 11/04/17 1710   Bed Mobility OT LTG   Bed Mobility OT LTG, Date Established 11/02/17 --    Bed Mobility OT LTG, Time to Achieve by discharge --    Bed Mobility OT LTG, Activity Type all bed mobility --    Bed Mobility OT LTG, [place] Level moderate assist (50% patient effort) --    Bed Mobility OT LTG, Date Goal Reviewed --  11/04/17   Bed Mobility OT LTG, Outcome --  goal not met   Bed Mobility OT LTG, Reason Goal Not Met --  discharged from facility       Goal: Strength Goal LTG- OT  Outcome: Unable to achieve outcome(s) by discharge Date Met:  11/04/17 11/02/17 1437 11/04/17 1710   Strength OT LTG   Strength Goal OT LTG, Date Established 11/02/17 --    Strength Goal OT LTG, Time to Achieve by discharge --    Strength Goal OT LTG, Measure to Achieve Pt. will complete BUE strengthening exercises all planes and joints to increase BUE strength for ADLs.  --    Strength Goal OT LTG, Date Goal Reviewed --  11/04/17   Strength Goal OT LTG, Outcome --  goal not met   Strength Goal OT LTG, Reason Goal Not Met --  discharged from facility       Goal: Static Sitting Balance Goal LTG- OT  Outcome: Unable to achieve outcome(s) by discharge Date Met:  11/04/17 11/02/17 1437 11/04/17 1710   Static Sitting Balance OT LTG   Static Sitting Balance OT LTG, Date Established 11/02/17 --    Static Sitting Balance OT LTG, Time to Achieve by discharge --    Static Sitting Balance OT LTG, [place] Level minimum assist (75% patient effort) --    Static Sitting Balance OT LTG, Assist Device UE Support --    Static Sitting Balance OT LTG, Date Goal Reviewed --  11/04/17   Static Sitting Balance OT LTG, Outcome --  goal not met   Static Sitting Balance OT LTG, Reason Goal Not Met --  discharged from facility       Goal: Grooming Goal LTG- OT  Outcome: Unable to  achieve outcome(s) by discharge Date Met:  11/04/17 11/02/17 1437 11/04/17 1710   Grooming OT LTG   Grooming Goal OT LTG, Date Established 11/02/17 --    Grooming Goal OT LTG, Time to Achieve by discharge --    Grooming Goal OT LTG, [place] Level moderate assist (50% patient effort) --    Grooming Goal OT LTG, Position sitting, edge of bed;sitting in chair --    Grooming Goal OT LTG, Date Goal Reviewed --  11/04/17   Grooming Goal OT LTG, Outcome --  goal not met   Grooming Goal OT LTG, Reason Goal Not Met --  discharged from facility

## 2017-11-05 LAB
ALBUMIN SERPL-MCNC: 2.6 G/DL (ref 3.4–4.8)
ALBUMIN/GLOB SERPL: 0.7 G/DL (ref 1.1–1.8)
ALP SERPL-CCNC: 70 U/L (ref 38–126)
ALT SERPL W P-5'-P-CCNC: 30 U/L (ref 21–72)
ANION GAP SERPL CALCULATED.3IONS-SCNC: 10 MMOL/L (ref 5–15)
AST SERPL-CCNC: 47 U/L (ref 17–59)
BACTERIA SPEC AEROBE CULT: NORMAL
BASOPHILS # BLD AUTO: 0.01 10*3/MM3 (ref 0–0.2)
BASOPHILS NFR BLD AUTO: 0.1 % (ref 0–2)
BILIRUB SERPL-MCNC: 0.7 MG/DL (ref 0.2–1.3)
BUN BLD-MCNC: 49 MG/DL (ref 7–21)
BUN/CREAT SERPL: 15.2 (ref 7–25)
CALCIUM SPEC-SCNC: 8.2 MG/DL (ref 8.4–10.2)
CHLORIDE SERPL-SCNC: 106 MMOL/L (ref 95–110)
CO2 SERPL-SCNC: 26 MMOL/L (ref 22–31)
CREAT BLD-MCNC: 3.22 MG/DL (ref 0.7–1.3)
DEPRECATED RDW RBC AUTO: 45 FL (ref 35.1–43.9)
EOSINOPHIL # BLD AUTO: 0 10*3/MM3 (ref 0–0.7)
EOSINOPHIL NFR BLD AUTO: 0 % (ref 0–7)
ERYTHROCYTE [DISTWIDTH] IN BLOOD BY AUTOMATED COUNT: 15.6 % (ref 11.5–14.5)
GFR SERPL CREATININE-BSD FRML MDRD: 20 ML/MIN/1.73 (ref 60–113)
GLOBULIN UR ELPH-MCNC: 3.6 GM/DL (ref 2.3–3.5)
GLUCOSE BLD-MCNC: 175 MG/DL (ref 60–100)
HBV E AG SERPL QL IA: POSITIVE
HCT VFR BLD AUTO: 21.6 % (ref 39–49)
HGB BLD-MCNC: 7.2 G/DL (ref 13.7–17.3)
IMM GRANULOCYTES # BLD: 0.1 10*3/MM3 (ref 0–0.02)
IMM GRANULOCYTES NFR BLD: 0.7 % (ref 0–0.5)
LYMPHOCYTES # BLD AUTO: 0.99 10*3/MM3 (ref 0.6–4.2)
LYMPHOCYTES NFR BLD AUTO: 7 % (ref 10–50)
MAGNESIUM SERPL-MCNC: 1.9 MG/DL (ref 1.6–2.3)
MCH RBC QN AUTO: 27 PG (ref 26.5–34)
MCHC RBC AUTO-ENTMCNC: 33.3 G/DL (ref 31.5–36.3)
MCV RBC AUTO: 80.9 FL (ref 80–98)
MONOCYTES # BLD AUTO: 0.89 10*3/MM3 (ref 0–0.9)
MONOCYTES NFR BLD AUTO: 6.3 % (ref 0–12)
NEUTROPHILS # BLD AUTO: 12.25 10*3/MM3 (ref 2–8.6)
NEUTROPHILS NFR BLD AUTO: 85.9 % (ref 37–80)
PLATELET # BLD AUTO: 114 10*3/MM3 (ref 150–450)
PMV BLD AUTO: 9.7 FL (ref 8–12)
POTASSIUM BLD-SCNC: 3.4 MMOL/L (ref 3.5–5.1)
PROT SERPL-MCNC: 6.2 G/DL (ref 6.3–8.6)
RBC # BLD AUTO: 2.67 10*6/MM3 (ref 4.37–5.74)
SODIUM BLD-SCNC: 142 MMOL/L (ref 137–145)
WBC NRBC COR # BLD: 14.24 10*3/MM3 (ref 3.2–9.8)

## 2017-11-05 PROCEDURE — 83735 ASSAY OF MAGNESIUM: CPT | Performed by: INTERNAL MEDICINE

## 2017-11-05 PROCEDURE — 80053 COMPREHEN METABOLIC PANEL: CPT | Performed by: INTERNAL MEDICINE

## 2017-11-05 PROCEDURE — 25010000002 METHYLPREDNISOLONE PER 125 MG: Performed by: INTERNAL MEDICINE

## 2017-11-05 PROCEDURE — 85025 COMPLETE CBC W/AUTO DIFF WBC: CPT | Performed by: INTERNAL MEDICINE

## 2017-11-05 PROCEDURE — 25010000002 CEFTRIAXONE PER 250 MG: Performed by: INTERNAL MEDICINE

## 2017-11-05 PROCEDURE — 25010000003 POTASSIUM CHLORIDE 10 MEQ/100ML SOLUTION: Performed by: INTERNAL MEDICINE

## 2017-11-05 PROCEDURE — 63510000001 EPOETIN ALFA PER 1000 UNITS: Performed by: INTERNAL MEDICINE

## 2017-11-05 PROCEDURE — 25010000002 LORAZEPAM PER 2 MG: Performed by: NURSE PRACTITIONER

## 2017-11-05 RX ORDER — POTASSIUM CHLORIDE 7.45 MG/ML
10 INJECTION INTRAVENOUS
Status: DISPENSED | OUTPATIENT
Start: 2017-11-05 | End: 2017-11-05

## 2017-11-05 RX ORDER — FOLIC ACID 1 MG/1
1 TABLET ORAL DAILY
Status: DISCONTINUED | OUTPATIENT
Start: 2017-11-05 | End: 2017-11-23 | Stop reason: HOSPADM

## 2017-11-06 LAB
ANION GAP SERPL CALCULATED.3IONS-SCNC: 15 MMOL/L (ref 5–15)
BASOPHILS # BLD AUTO: 0 10*3/MM3 (ref 0–0.2)
BASOPHILS NFR BLD AUTO: 0 % (ref 0–2)
BUN BLD-MCNC: 66 MG/DL (ref 7–21)
BUN/CREAT SERPL: 19 (ref 7–25)
CALCIUM SPEC-SCNC: 8.1 MG/DL (ref 8.4–10.2)
CHLORIDE SERPL-SCNC: 105 MMOL/L (ref 95–110)
CO2 SERPL-SCNC: 24 MMOL/L (ref 22–31)
CREAT BLD-MCNC: 3.47 MG/DL (ref 0.7–1.3)
DEPRECATED RDW RBC AUTO: 46.3 FL (ref 35.1–43.9)
EOSINOPHIL # BLD AUTO: 0 10*3/MM3 (ref 0–0.7)
EOSINOPHIL NFR BLD AUTO: 0 % (ref 0–7)
ERYTHROCYTE [DISTWIDTH] IN BLOOD BY AUTOMATED COUNT: 16.2 % (ref 11.5–14.5)
GFR SERPL CREATININE-BSD FRML MDRD: 18 ML/MIN/1.73 (ref 49–113)
GLUCOSE BLD-MCNC: 227 MG/DL (ref 60–100)
HCT VFR BLD AUTO: 22.6 % (ref 39–49)
HGB BLD-MCNC: 7.5 G/DL (ref 13.7–17.3)
IMM GRANULOCYTES # BLD: 0.17 10*3/MM3 (ref 0–0.02)
IMM GRANULOCYTES NFR BLD: 0.9 % (ref 0–0.5)
LYMPHOCYTES # BLD AUTO: 0.62 10*3/MM3 (ref 0.6–4.2)
LYMPHOCYTES NFR BLD AUTO: 3.4 % (ref 10–50)
MAGNESIUM SERPL-MCNC: 1.9 MG/DL (ref 1.6–2.3)
MCH RBC QN AUTO: 27.3 PG (ref 26.5–34)
MCHC RBC AUTO-ENTMCNC: 33.2 G/DL (ref 31.5–36.3)
MCV RBC AUTO: 82.2 FL (ref 80–98)
MONOCYTES # BLD AUTO: 0.57 10*3/MM3 (ref 0–0.9)
MONOCYTES NFR BLD AUTO: 3.1 % (ref 0–12)
NEUTROPHILS # BLD AUTO: 17.01 10*3/MM3 (ref 2–8.6)
NEUTROPHILS NFR BLD AUTO: 92.6 % (ref 37–80)
PLATELET # BLD AUTO: 158 10*3/MM3 (ref 150–450)
PMV BLD AUTO: 10.5 FL (ref 8–12)
POTASSIUM BLD-SCNC: 3.6 MMOL/L (ref 3.5–5.1)
RBC # BLD AUTO: 2.75 10*6/MM3 (ref 4.37–5.74)
SODIUM BLD-SCNC: 144 MMOL/L (ref 137–145)
WBC NRBC COR # BLD: 18.37 10*3/MM3 (ref 3.2–9.8)

## 2017-11-06 PROCEDURE — 97110 THERAPEUTIC EXERCISES: CPT

## 2017-11-06 PROCEDURE — 80048 BASIC METABOLIC PNL TOTAL CA: CPT | Performed by: INTERNAL MEDICINE

## 2017-11-06 PROCEDURE — 25010000002 METHYLPREDNISOLONE PER 125 MG: Performed by: INTERNAL MEDICINE

## 2017-11-06 PROCEDURE — 97530 THERAPEUTIC ACTIVITIES: CPT

## 2017-11-06 PROCEDURE — 83735 ASSAY OF MAGNESIUM: CPT | Performed by: INTERNAL MEDICINE

## 2017-11-06 PROCEDURE — 85025 COMPLETE CBC W/AUTO DIFF WBC: CPT | Performed by: INTERNAL MEDICINE

## 2017-11-06 PROCEDURE — 25010000002 CEFTRIAXONE PER 250 MG: Performed by: INTERNAL MEDICINE

## 2017-11-06 RX ORDER — SODIUM CHLORIDE 9 MG/ML
INJECTION, SOLUTION INTRAVENOUS
Status: DISPENSED
Start: 2017-11-06 | End: 2017-11-06

## 2017-11-07 ENCOUNTER — APPOINTMENT (OUTPATIENT)
Dept: CT IMAGING | Facility: HOSPITAL | Age: 62
End: 2017-11-07

## 2017-11-07 ENCOUNTER — APPOINTMENT (OUTPATIENT)
Dept: GENERAL RADIOLOGY | Facility: HOSPITAL | Age: 62
End: 2017-11-07

## 2017-11-07 LAB
ALBUMIN SERPL-MCNC: 3 G/DL (ref 3.4–4.8)
ALBUMIN/GLOB SERPL: 0.8 G/DL (ref 1.1–1.8)
ALP SERPL-CCNC: 83 U/L (ref 38–126)
ALT SERPL W P-5'-P-CCNC: 38 U/L (ref 21–72)
ANION GAP SERPL CALCULATED.3IONS-SCNC: 12 MMOL/L (ref 5–15)
AST SERPL-CCNC: 50 U/L (ref 17–59)
BILIRUB SERPL-MCNC: 0.7 MG/DL (ref 0.2–1.3)
BUN BLD-MCNC: 45 MG/DL (ref 7–21)
BUN/CREAT SERPL: 18.4 (ref 7–25)
CALCIUM SPEC-SCNC: 8.2 MG/DL (ref 8.4–10.2)
CHLORIDE SERPL-SCNC: 105 MMOL/L (ref 95–110)
CO2 SERPL-SCNC: 25 MMOL/L (ref 22–31)
CREAT BLD-MCNC: 2.44 MG/DL (ref 0.7–1.3)
DEPRECATED RDW RBC AUTO: 46.8 FL (ref 35.1–43.9)
ERYTHROCYTE [DISTWIDTH] IN BLOOD BY AUTOMATED COUNT: 16.5 % (ref 11.5–14.5)
GFR SERPL CREATININE-BSD FRML MDRD: 27 ML/MIN/1.73 (ref 60–113)
GLOBULIN UR ELPH-MCNC: 3.6 GM/DL (ref 2.3–3.5)
GLUCOSE BLD-MCNC: 168 MG/DL (ref 60–100)
HCT VFR BLD AUTO: 23 % (ref 39–49)
HGB BLD-MCNC: 7.6 G/DL (ref 13.7–17.3)
MAGNESIUM SERPL-MCNC: 2 MG/DL (ref 1.6–2.3)
MCH RBC QN AUTO: 27 PG (ref 26.5–34)
MCHC RBC AUTO-ENTMCNC: 33 G/DL (ref 31.5–36.3)
MCV RBC AUTO: 81.9 FL (ref 80–98)
PHOSPHATE SERPL-MCNC: 3.3 MG/DL (ref 2.4–4.4)
PLATELET # BLD AUTO: 115 10*3/MM3 (ref 150–450)
PMV BLD AUTO: 10.7 FL (ref 8–12)
POTASSIUM BLD-SCNC: 3.7 MMOL/L (ref 3.5–5.1)
PROT SERPL-MCNC: 6.6 G/DL (ref 6.3–8.6)
RBC # BLD AUTO: 2.81 10*6/MM3 (ref 4.37–5.74)
SODIUM BLD-SCNC: 142 MMOL/L (ref 137–145)
WBC NRBC COR # BLD: 17.65 10*3/MM3 (ref 3.2–9.8)

## 2017-11-07 PROCEDURE — 71010 HC CHEST PA OR AP: CPT

## 2017-11-07 PROCEDURE — 84100 ASSAY OF PHOSPHORUS: CPT | Performed by: INTERNAL MEDICINE

## 2017-11-07 PROCEDURE — 70450 CT HEAD/BRAIN W/O DYE: CPT

## 2017-11-07 PROCEDURE — 97110 THERAPEUTIC EXERCISES: CPT

## 2017-11-07 PROCEDURE — 83735 ASSAY OF MAGNESIUM: CPT | Performed by: INTERNAL MEDICINE

## 2017-11-07 PROCEDURE — 25010000002 CEFTRIAXONE PER 250 MG: Performed by: INTERNAL MEDICINE

## 2017-11-07 PROCEDURE — 80053 COMPREHEN METABOLIC PANEL: CPT | Performed by: INTERNAL MEDICINE

## 2017-11-07 PROCEDURE — 25010000002 METHYLPREDNISOLONE PER 125 MG: Performed by: INTERNAL MEDICINE

## 2017-11-07 PROCEDURE — 97530 THERAPEUTIC ACTIVITIES: CPT

## 2017-11-07 PROCEDURE — 97112 NEUROMUSCULAR REEDUCATION: CPT

## 2017-11-07 PROCEDURE — 85027 COMPLETE CBC AUTOMATED: CPT | Performed by: INTERNAL MEDICINE

## 2017-11-07 PROCEDURE — 25010000002 LORAZEPAM PER 2 MG: Performed by: NURSE PRACTITIONER

## 2017-11-07 RX ORDER — SODIUM CHLORIDE 9 MG/ML
INJECTION, SOLUTION INTRAVENOUS
Status: DISPENSED
Start: 2017-11-07 | End: 2017-11-08

## 2017-11-08 LAB
ANION GAP SERPL CALCULATED.3IONS-SCNC: 18 MMOL/L (ref 5–15)
BACTERIA SPEC AEROBE CULT: NORMAL
BACTERIA SPEC AEROBE CULT: NORMAL
BUN BLD-MCNC: 62 MG/DL (ref 7–21)
BUN/CREAT SERPL: 19.6 (ref 7–25)
CALCIUM SPEC-SCNC: 8.4 MG/DL (ref 8.4–10.2)
CHLORIDE SERPL-SCNC: 104 MMOL/L (ref 95–110)
CO2 SERPL-SCNC: 21 MMOL/L (ref 22–31)
CREAT BLD-MCNC: 3.17 MG/DL (ref 0.7–1.3)
GFR SERPL CREATININE-BSD FRML MDRD: 20 ML/MIN/1.73 (ref 49–113)
GLUCOSE BLD-MCNC: 191 MG/DL (ref 60–100)
POTASSIUM BLD-SCNC: 4.3 MMOL/L (ref 3.5–5.1)
SODIUM BLD-SCNC: 143 MMOL/L (ref 137–145)

## 2017-11-08 PROCEDURE — 97530 THERAPEUTIC ACTIVITIES: CPT

## 2017-11-08 PROCEDURE — 97110 THERAPEUTIC EXERCISES: CPT

## 2017-11-08 PROCEDURE — 25010000002 METHYLPREDNISOLONE PER 125 MG: Performed by: INTERNAL MEDICINE

## 2017-11-08 PROCEDURE — 25010000002 LORAZEPAM PER 2 MG: Performed by: NURSE PRACTITIONER

## 2017-11-08 PROCEDURE — 80048 BASIC METABOLIC PNL TOTAL CA: CPT | Performed by: INTERNAL MEDICINE

## 2017-11-08 PROCEDURE — 25010000002 CEFTRIAXONE PER 250 MG: Performed by: INTERNAL MEDICINE

## 2017-11-08 RX ORDER — SODIUM CHLORIDE 9 MG/ML
INJECTION, SOLUTION INTRAVENOUS
Status: DISPENSED
Start: 2017-11-08 | End: 2017-11-09

## 2017-11-09 ENCOUNTER — APPOINTMENT (OUTPATIENT)
Dept: GENERAL RADIOLOGY | Facility: HOSPITAL | Age: 62
End: 2017-11-09

## 2017-11-09 LAB
ALBUMIN SERPL-MCNC: 2.7 G/DL (ref 3.4–4.8)
ALBUMIN/GLOB SERPL: 0.8 G/DL (ref 1.1–1.8)
ALP SERPL-CCNC: 133 U/L (ref 38–126)
ALT SERPL W P-5'-P-CCNC: 654 U/L (ref 21–72)
ANION GAP SERPL CALCULATED.3IONS-SCNC: 11 MMOL/L (ref 5–15)
AST SERPL-CCNC: 659 U/L (ref 17–59)
BILIRUB SERPL-MCNC: 0.7 MG/DL (ref 0.2–1.3)
BUN BLD-MCNC: 48 MG/DL (ref 7–21)
BUN/CREAT SERPL: 22.1 (ref 7–25)
CALCIUM SPEC-SCNC: 8.1 MG/DL (ref 8.4–10.2)
CHLORIDE SERPL-SCNC: 101 MMOL/L (ref 95–110)
CO2 SERPL-SCNC: 28 MMOL/L (ref 22–31)
CREAT BLD-MCNC: 2.17 MG/DL (ref 0.7–1.3)
GFR SERPL CREATININE-BSD FRML MDRD: 31 ML/MIN/1.73 (ref 49–113)
GLOBULIN UR ELPH-MCNC: 3.3 GM/DL (ref 2.3–3.5)
GLUCOSE BLD-MCNC: 170 MG/DL (ref 60–100)
MAGNESIUM SERPL-MCNC: 2 MG/DL (ref 1.6–2.3)
POTASSIUM BLD-SCNC: 3.4 MMOL/L (ref 3.5–5.1)
PROT SERPL-MCNC: 6 G/DL (ref 6.3–8.6)
SODIUM BLD-SCNC: 140 MMOL/L (ref 137–145)

## 2017-11-09 PROCEDURE — 25010000002 CEFTRIAXONE PER 250 MG: Performed by: INTERNAL MEDICINE

## 2017-11-09 PROCEDURE — 25010000002 LORAZEPAM PER 2 MG: Performed by: NURSE PRACTITIONER

## 2017-11-09 PROCEDURE — 83735 ASSAY OF MAGNESIUM: CPT | Performed by: INTERNAL MEDICINE

## 2017-11-09 PROCEDURE — 71010 HC CHEST PA OR AP: CPT

## 2017-11-09 PROCEDURE — 25010000002 METHYLPREDNISOLONE PER 125 MG: Performed by: INTERNAL MEDICINE

## 2017-11-09 PROCEDURE — 97530 THERAPEUTIC ACTIVITIES: CPT

## 2017-11-09 PROCEDURE — 80053 COMPREHEN METABOLIC PANEL: CPT | Performed by: INTERNAL MEDICINE

## 2017-11-09 RX ORDER — CLOTRIMAZOLE AND BETAMETHASONE DIPROPIONATE 10; .64 MG/G; MG/G
CREAM TOPICAL EVERY 12 HOURS SCHEDULED
Status: DISCONTINUED | OUTPATIENT
Start: 2017-11-09 | End: 2017-11-23 | Stop reason: HOSPADM

## 2017-11-09 RX ORDER — SODIUM CHLORIDE 9 MG/ML
INJECTION, SOLUTION INTRAVENOUS
Status: DISPENSED
Start: 2017-11-09 | End: 2017-11-10

## 2017-11-10 ENCOUNTER — APPOINTMENT (OUTPATIENT)
Dept: GENERAL RADIOLOGY | Facility: HOSPITAL | Age: 62
End: 2017-11-10

## 2017-11-10 LAB
ABO GROUP BLD: NORMAL
ALBUMIN SERPL-MCNC: 2.7 G/DL (ref 3.4–4.8)
ALBUMIN/GLOB SERPL: 0.8 G/DL (ref 1.1–1.8)
ALP SERPL-CCNC: 125 U/L (ref 38–126)
ALT SERPL W P-5'-P-CCNC: 512 U/L (ref 21–72)
ANION GAP SERPL CALCULATED.3IONS-SCNC: 13 MMOL/L (ref 5–15)
ANISOCYTOSIS BLD QL: NORMAL
AST SERPL-CCNC: 236 U/L (ref 17–59)
BILIRUB SERPL-MCNC: 0.9 MG/DL (ref 0.2–1.3)
BLD GP AB SCN SERPL QL: NEGATIVE
BUN BLD-MCNC: 66 MG/DL (ref 7–21)
BUN/CREAT SERPL: 21.8 (ref 7–25)
CALCIUM SPEC-SCNC: 8.5 MG/DL (ref 8.4–10.2)
CHLORIDE SERPL-SCNC: 100 MMOL/L (ref 95–110)
CO2 SERPL-SCNC: 27 MMOL/L (ref 22–31)
CREAT BLD-MCNC: 3.03 MG/DL (ref 0.7–1.3)
DEPRECATED RDW RBC AUTO: 50.3 FL (ref 35.1–43.9)
ERYTHROCYTE [DISTWIDTH] IN BLOOD BY AUTOMATED COUNT: 17.2 % (ref 11.5–14.5)
GFR SERPL CREATININE-BSD FRML MDRD: 21 ML/MIN/1.73 (ref 60–113)
GLOBULIN UR ELPH-MCNC: 3.4 GM/DL (ref 2.3–3.5)
GLUCOSE BLD-MCNC: 192 MG/DL (ref 60–100)
HCT VFR BLD AUTO: 21.3 % (ref 39–49)
HGB BLD-MCNC: 6.8 G/DL (ref 13.7–17.3)
HYPOCHROMIA BLD QL: NORMAL
Lab: NORMAL
MACROCYTES BLD QL SMEAR: NORMAL
MCH RBC QN AUTO: 26.8 PG (ref 26.5–34)
MCHC RBC AUTO-ENTMCNC: 31.9 G/DL (ref 31.5–36.3)
MCV RBC AUTO: 83.9 FL (ref 80–98)
PLATELET # BLD AUTO: 82 10*3/MM3 (ref 150–450)
PMV BLD AUTO: ABNORMAL FL (ref 8–12)
POLYCHROMASIA BLD QL SMEAR: NORMAL
POTASSIUM BLD-SCNC: 3.6 MMOL/L (ref 3.5–5.1)
PROT SERPL-MCNC: 6.1 G/DL (ref 6.3–8.6)
RBC # BLD AUTO: 2.54 10*6/MM3 (ref 4.37–5.74)
RH BLD: POSITIVE
SMALL PLATELETS BLD QL SMEAR: NORMAL
SODIUM BLD-SCNC: 140 MMOL/L (ref 137–145)
WBC MORPH BLD: NORMAL
WBC NRBC COR # BLD: 16.99 10*3/MM3 (ref 3.2–9.8)

## 2017-11-10 PROCEDURE — 25010000002 LORAZEPAM PER 2 MG: Performed by: NURSE PRACTITIONER

## 2017-11-10 PROCEDURE — 80053 COMPREHEN METABOLIC PANEL: CPT | Performed by: INTERNAL MEDICINE

## 2017-11-10 PROCEDURE — P9016 RBC LEUKOCYTES REDUCED: HCPCS

## 2017-11-10 PROCEDURE — 97112 NEUROMUSCULAR REEDUCATION: CPT

## 2017-11-10 PROCEDURE — 86923 COMPATIBILITY TEST ELECTRIC: CPT

## 2017-11-10 PROCEDURE — 25010000002 CEFTRIAXONE PER 250 MG: Performed by: INTERNAL MEDICINE

## 2017-11-10 PROCEDURE — 97535 SELF CARE MNGMENT TRAINING: CPT

## 2017-11-10 PROCEDURE — 86850 RBC ANTIBODY SCREEN: CPT | Performed by: INTERNAL MEDICINE

## 2017-11-10 PROCEDURE — 25010000002 METHYLPREDNISOLONE PER 125 MG: Performed by: INTERNAL MEDICINE

## 2017-11-10 PROCEDURE — 85027 COMPLETE CBC AUTOMATED: CPT | Performed by: INTERNAL MEDICINE

## 2017-11-10 PROCEDURE — 86900 BLOOD TYPING SEROLOGIC ABO: CPT | Performed by: INTERNAL MEDICINE

## 2017-11-10 PROCEDURE — 71010 HC CHEST PA OR AP: CPT

## 2017-11-10 PROCEDURE — 86900 BLOOD TYPING SEROLOGIC ABO: CPT

## 2017-11-10 PROCEDURE — 86901 BLOOD TYPING SEROLOGIC RH(D): CPT | Performed by: INTERNAL MEDICINE

## 2017-11-10 PROCEDURE — 85007 BL SMEAR W/DIFF WBC COUNT: CPT | Performed by: INTERNAL MEDICINE

## 2017-11-10 NOTE — NURSING NOTE
Dr Estrada and anesthesia in to see patient.  See md notes and crna notes. Procedure cancelled at this time.

## 2017-11-10 NOTE — H&P
SUBJECTIVE:   11/10/2017    Name: Héctor Altamirano Hayes  DOD: 1955     REASON FOR CONSULT: PEG tube placement.    Chief Complaint:   No chief complaint on file.      Subjective     Patient is 62 y.o. male with inability to take oral intake and patient also has inability to keep a NG tube in patient will need PEG tube feedings for nutrition.     ROS/HISTORY/ CURRENT MEDICATIONS/OBJECTIVE/VS/PE:   Review of Systems:   Review of Systems    History:     Past Medical History:   Diagnosis Date   • Atherosclerosis    • Chronic obstructive lung disease    • Chronic pain    • Drug dependence    • Hypertension      Past Surgical History:   Procedure Laterality Date   • DIAPHRAGMATIC HERNIA REPAIR     • HIP SURGERY     • LIVER BIOPSY       Family History   Problem Relation Age of Onset   • Hypertension Mother      Social History   Substance Use Topics   • Smoking status: Current Every Day Smoker     Packs/day: 1.00   • Smokeless tobacco: Never Used   • Alcohol use No     Prescriptions Prior to Admission   Medication Sig Dispense Refill Last Dose   • albuterol (PROVENTIL) (2.5 MG/3ML) 0.083% nebulizer solution Take 2.5 mg by nebulization 4 (Four) Times a Day. 120 vial 1 Unknown at Unknown time   • ampicillin 2 g/100 mL 0.9% NS (MBP) Infuse 100 mL into a venous catheter Every 8 (Eight) Hours. Indications: Endocarditis 100 mL 12    • cefTRIAXone (ROCEPHIN) 2 g/100 mL 0.9% NS VTB (ISAEL) Infuse 100 mL into a venous catheter Every 12 (Twelve) Hours. Indications: Pneumonia 1 each 12    • chlorhexidine (PERIDEX) 0.12 % solution Apply 15 mL to the mouth or throat Every 12 (Twelve) Hours. 60 mL 12    • famotidine (PEPCID) 10 MG/ML solution injection Infuse 1 mL into a venous catheter 2 (Two) Times a Day. 4 mL 12    • hydrALAZINE (APRESOLINE) 20 MG/ML injection Infuse 0.5 mL into a venous catheter Every 6 (Six) Hours As Needed for High Blood Pressure. 1 mL 10    • methylPREDNISolone sodium succinate (SOLU-Medrol) 125 MG injection  Infuse 1.28 mL into a venous catheter Every 6 (Six) Hours. 1 vial 12    • morphine 8 MG/ML injection Infuse 0.5 mL into a venous catheter Every 1 (One) Hour As Needed (MODERATE PAIN). 1 mL 0    • oxyCODONE-acetaminophen (PERCOCET)  MG per tablet Take 1 tablet by mouth Every 4 (Four) Hours As Needed for Moderate Pain (4-6). 30 tablet 0 Unknown at Unknown time     Allergies:  Review of patient's allergies indicates no known allergies.    I have reviewed the patients medical history, surgical history and family history in the available medical record system.     Current Medications:     Current Facility-Administered Medications   Medication Dose Route Frequency Provider Last Rate Last Dose   • albuterol (PROVENTIL) nebulizer solution 0.083% 2.5 mg/3mL  2.5 mg Nebulization Q6H - RT Juan Manuel Dumas MD       • ampicillin 2 g/100 mL 0.9% NS (MBP)  2 g Intravenous Q8H Juan Manuel Dumas MD       • cefTRIAXone (ROCEPHIN) in SWFI 2 GRAMS/20ml IV PUSH syringe  2 g Intravenous Q12H Juan Manuel Dumas MD       • chlorhexidine (PERIDEX) 0.12 % solution 15 mL  15 mL Mouth/Throat Q12H Juan Manuel Dumas MD       • clotrimazole-betamethasone (LOTRISONE) 1-0.05 % cream   Topical Q12H Juan Manuel Dumas MD       • epoetin miladis (EPOGEN,PROCRIT) injection 10,000 Units  10,000 Units Intravenous Once per day on Mon Wed Fri James Mcmanus MD       • famotidine (PEPCID) injection 10 mg  10 mg Intravenous BID Juan Manuel Dumas MD       • folic acid (FOLVITE) tablet 1 mg  1 mg Oral Daily James Mcmanus MD       • LORazepam (ATIVAN) injection 0.5 mg  0.5 mg Intravenous Q4H PRN GUSTAVO Galeana       • magic butt ointment   Topical BID Juan Manuel Dumas MD       • magic butt ointment   Topical PRN Juan Manuel Dumas MD       • methylPREDNISolone sodium succinate (SOLU-Medrol) injection 80 mg  80 mg Intravenous Q6H Juan Manuel Dumas MD       • morphine injection 4 mg  4 mg  Intravenous Q1H PRN Juan Manuel Dumas MD       • sodium chloride 0.9 % flush 10 mL  10 mL Intravenous Q8H Juan Manuel Dumas MD       • sodium chloride 0.9 % flush 10 mL  10 mL Intravenous PRN Juan Manuel Dumas MD           Objective     Physical Exam:   Heart Rate:  [86-92] 86    Physical Exam:  General Appearance:    Alert, cooperative, in no acute distress   Head:    Normocephalic, without obvious abnormality, atraumatic   Eyes:            Lids and lashes normal, conjunctivae and sclerae normal, no   icterus, no pallor, corneas clear, PERRLA   Ears:    Ears appear intact with no abnormalities noted   Throat:   No oral lesions, no thrush, oral mucosa moist   Neck:   No adenopathy, supple, trachea midline, no thyromegaly, no     carotid bruit, no JVD   Back:     No kyphosis present, no scoliosis present, no skin lesions,       erythema or scars, no tenderness to percussion or                   palpation,   range of motion normal   Lungs:     Clear to auscultation,respirations regular, even and                   unlabored    Heart:    Regular rhythm and normal rate, normal S1 and S2, no            murmur, no gallop, no rub, no click   Breast Exam:    Deferred   Abdomen:     Normal bowel sounds, no masses, no organomegaly, soft        non-tender, non-distended, no guarding, no rebound                 tenderness   Genitalia:    Deferred   Extremities:   Moves all extremities well, no edema, no cyanosis, no              redness   Pulses:   Pulses palpable and equal bilaterally   Skin:   No bleeding, bruising or rash   Lymph nodes:   No palpable adenopathy   Neurologic:   Cranial nerves 2 - 12 grossly intact, sensation intact, DTR        present and equal bilaterally      Results Review:     Lab Results   Component Value Date    WBC 16.99 (H) 11/10/2017    WBC 17.65 (H) 11/07/2017    WBC 18.37 (H) 11/06/2017    HGB 6.8 (L) 11/10/2017    HGB 7.6 (L) 11/07/2017    HGB 7.5 (L) 11/06/2017    HCT 21.3 (L)  11/10/2017    HCT 23.0 (L) 11/07/2017    HCT 22.6 (L) 11/06/2017    PLT 82 (L) 11/10/2017     (L) 11/07/2017     11/06/2017       Results from last 7 days  Lab Units 11/10/17  0451 11/09/17  0500 11/07/17  0500   ALK PHOS U/L 125 133* 83   ALT (SGPT) U/L 512* 654* 38   AST (SGOT) U/L 236* 659* 50       Results from last 7 days  Lab Units 11/10/17  0451 11/09/17  0500 11/07/17  0500   BILIRUBIN mg/dL 0.9 0.7 0.7   ALK PHOS U/L 125 133* 83     No results found for: LIPASE  Lab Results   Component Value Date    INR 1.15 10/26/2017    INR 1.05 03/11/2017    INR 0.9 09/04/2016         Radiology Review:  Imaging Results (last 72 hours)     Procedure Component Value Units Date/Time    CT Head Without Contrast [[id number]] Collected:  11/07/17 1640     Updated:  11/07/17 2227    Narrative:       EXAM DESCRIPTION: CT HEAD WO CONTRAST    CLINICAL HISTORY:  f/u cva, Z74.09 Other reduced mobility Z74.09  Other reduced mobility       COMPARISON: 11/1/2017    DOSE LENGTH PRODUCT: 978.90    CONTRAST: None    TECHNIQUE:    Axial images from skull base to vertex.    This exam was performed according to our departmental dose  optimization program, which includes automated exposure control,  adjustment of the mA and/or KV according to patient size and/or  use of iterative reconstruction technique.    FINDINGS:  There are continued limitations of the examination secondary to  the extensive beam hardening artifact contributed by the right  parieto-occipital metal plate at the level of prior craniotomy.    The craniovertebral junction is unremarkable. No Chiari  malformation.    Extra-axial spaces: Normal for age.    Intracranial hemorrhage: Within the limits of the study no  evidence of intracranial hemorrhage or suspicious extra-axial  fluid collections..  Ventricular system: No hydrocephalus.   Basal cisterns: Unremarkable.   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 No  edema, midline shift, or mass  effect..    Midline shift: None.    Cerebellum: No acute or suspicious interval change.   Brainstem unremarkable CT appearance.   Calvarium no acute calvarial fracture..    Vascular system: Unremarkable noncontrast appearance.    Paranasal sinuses and mastoid air cells: Trace scattered mucosal  thickening. The mastoid air cells and middle ear cavities are  clear..    Visualized orbits: Unremarkable.    Visualized upper cervical spine: Unremarkable.   Sella: Unremarkable CT appearance.    Skull base: No acute findings.     ADDITIONAL FINDINGS: The esophagogastric tube seen coursing  through the right nasal passage.      Impression:       Continued limitations of the examination by beam hardening  artifact from the right parieto-occipital craniotomy plate.    No acute interval change. No obvious intracranial hemorrhage,  midline shift, or mass effect.    Electronically signed by:  Douglas Mccormack MD  11/7/2017 10:26 PM  CST Workstation: [id number]    XR Chest 1 View [[id number]] Collected:  11/09/17 0039     Updated:  11/09/17 0109    Narrative:         Chest single view on  11/9/2017     CLINICAL INDICATION: NG tube placement    COMPARISON: 11/7/2017    FINDINGS: The upper most chest and is not fully included. The  patient is rotated on this exam. NG tube extends into the upper  stomach with its side-port near the GE junction. Again recommend  advancement of the NG tube by approximately 10 cm for more  optimal positioning. Left-sided PICC line tip is in the SVC.  Heart is within normal limits for size. Chronic interstitial  changes are noted. Small left pleural effusion may be present.      Impression:       No significant change in the appearance of the chest.  Again recommend advancement of the NG tube as above.    Electronically signed by:  Chuckie Chris  11/9/2017 1:08 AM  CST Workstation: [id number]           I reviewed the patient's new clinical results.  I reviewed the  patient's new imaging results and agree with the interpretation.     ASSESSMENT/PLAN:   ASSESSMENT: Patient with inability to eat and multiple failed swallows.  Patient will need a PEG tube placed for feedings.    PLAN: 4 take PEG tube placement.  The risks, benefits, and alternatives of this procedure have been discussed with the patient or the responsible party- the patient understands and agrees to proceed.         Renaldo Guzman MD  11/10/17  12:49 PM         This document has been electronically signed by Renaldo Guzman MD on November 10, 2017 12:49 PM

## 2017-11-10 NOTE — NURSING NOTE
Spoke with Medina Mejia, pt's daughter, about EGD with Peg tube placement procedure. Pt stated that she understood the procedure and gave permission over the telephone for consent. Kane MosqueraCRNA, spoke with Ms.Jackie about the anesthesia for the procedure. She gave consent for anesthesia as well.

## 2017-11-10 NOTE — NURSING NOTE
Called report to cameron William RN and let her know that pt procedure was canceled by anesthesia and .

## 2017-11-11 ENCOUNTER — APPOINTMENT (OUTPATIENT)
Dept: GENERAL RADIOLOGY | Facility: HOSPITAL | Age: 62
End: 2017-11-11

## 2017-11-11 LAB
ABO + RH BLD: NORMAL
ABO + RH BLD: NORMAL
ALBUMIN SERPL-MCNC: 3 G/DL (ref 3.4–4.8)
ALBUMIN/GLOB SERPL: 0.9 G/DL (ref 1.1–1.8)
ALP SERPL-CCNC: 131 U/L (ref 38–126)
ALT SERPL W P-5'-P-CCNC: 461 U/L (ref 21–72)
ANION GAP SERPL CALCULATED.3IONS-SCNC: 14 MMOL/L (ref 5–15)
AST SERPL-CCNC: 183 U/L (ref 17–59)
BH BB BLOOD EXPIRATION DATE: NORMAL
BH BB BLOOD EXPIRATION DATE: NORMAL
BH BB BLOOD TYPE BARCODE: 6200
BH BB BLOOD TYPE BARCODE: 6200
BH BB DISPENSE STATUS: NORMAL
BH BB DISPENSE STATUS: NORMAL
BH BB PRODUCT CODE: NORMAL
BH BB PRODUCT CODE: NORMAL
BH BB UNIT NUMBER: NORMAL
BH BB UNIT NUMBER: NORMAL
BILIRUB SERPL-MCNC: 1.1 MG/DL (ref 0.2–1.3)
BUN BLD-MCNC: 41 MG/DL (ref 7–21)
BUN/CREAT SERPL: 17.3 (ref 7–25)
CALCIUM SPEC-SCNC: 8.6 MG/DL (ref 8.4–10.2)
CHLORIDE SERPL-SCNC: 101 MMOL/L (ref 95–110)
CO2 SERPL-SCNC: 26 MMOL/L (ref 22–31)
CREAT BLD-MCNC: 2.37 MG/DL (ref 0.7–1.3)
DEPRECATED RDW RBC AUTO: 47.3 FL (ref 35.1–43.9)
ERYTHROCYTE [DISTWIDTH] IN BLOOD BY AUTOMATED COUNT: 16.3 % (ref 11.5–14.5)
GFR SERPL CREATININE-BSD FRML MDRD: 28 ML/MIN/1.73 (ref 49–113)
GLOBULIN UR ELPH-MCNC: 3.3 GM/DL (ref 2.3–3.5)
GLUCOSE BLD-MCNC: 169 MG/DL (ref 60–100)
HCT VFR BLD AUTO: 25.8 % (ref 39–49)
HGB BLD-MCNC: 8.5 G/DL (ref 13.7–17.3)
MAGNESIUM SERPL-MCNC: 2.1 MG/DL (ref 1.6–2.3)
MCH RBC QN AUTO: 27.9 PG (ref 26.5–34)
MCHC RBC AUTO-ENTMCNC: 32.9 G/DL (ref 31.5–36.3)
MCV RBC AUTO: 84.6 FL (ref 80–98)
OVALOCYTES BLD QL SMEAR: NORMAL
PLATELET # BLD AUTO: 57 10*3/MM3 (ref 150–450)
PMV BLD AUTO: 12.4 FL (ref 8–12)
POLYCHROMASIA BLD QL SMEAR: NORMAL
POTASSIUM BLD-SCNC: 3.7 MMOL/L (ref 3.5–5.1)
PROT SERPL-MCNC: 6.3 G/DL (ref 6.3–8.6)
RBC # BLD AUTO: 3.05 10*6/MM3 (ref 4.37–5.74)
SMALL PLATELETS BLD QL SMEAR: NORMAL
SODIUM BLD-SCNC: 141 MMOL/L (ref 137–145)
UNIT  ABO: NORMAL
UNIT  ABO: NORMAL
UNIT  RH: NORMAL
UNIT  RH: NORMAL
WBC MORPH BLD: NORMAL
WBC NRBC COR # BLD: 11.02 10*3/MM3 (ref 3.2–9.8)

## 2017-11-11 PROCEDURE — 25010000002 CEFTRIAXONE PER 250 MG: Performed by: INTERNAL MEDICINE

## 2017-11-11 PROCEDURE — 80053 COMPREHEN METABOLIC PANEL: CPT | Performed by: INTERNAL MEDICINE

## 2017-11-11 PROCEDURE — 25010000002 LORAZEPAM PER 2 MG: Performed by: NURSE PRACTITIONER

## 2017-11-11 PROCEDURE — 83735 ASSAY OF MAGNESIUM: CPT | Performed by: INTERNAL MEDICINE

## 2017-11-11 PROCEDURE — 85007 BL SMEAR W/DIFF WBC COUNT: CPT | Performed by: INTERNAL MEDICINE

## 2017-11-11 PROCEDURE — 25010000002 METHYLPREDNISOLONE PER 125 MG: Performed by: INTERNAL MEDICINE

## 2017-11-11 PROCEDURE — 97110 THERAPEUTIC EXERCISES: CPT

## 2017-11-11 PROCEDURE — 85027 COMPLETE CBC AUTOMATED: CPT | Performed by: INTERNAL MEDICINE

## 2017-11-11 RX ORDER — SODIUM CHLORIDE 9 MG/ML
INJECTION, SOLUTION INTRAVENOUS
Status: DISPENSED
Start: 2017-11-11 | End: 2017-11-12

## 2017-11-12 PROCEDURE — 25010000002 LORAZEPAM PER 2 MG: Performed by: NURSE PRACTITIONER

## 2017-11-12 PROCEDURE — 25010000002 METHYLPREDNISOLONE PER 125 MG: Performed by: INTERNAL MEDICINE

## 2017-11-12 PROCEDURE — 82962 GLUCOSE BLOOD TEST: CPT

## 2017-11-12 PROCEDURE — 25010000002 CEFTRIAXONE PER 250 MG: Performed by: INTERNAL MEDICINE

## 2017-11-12 RX ORDER — SODIUM CHLORIDE 9 MG/ML
100 INJECTION, SOLUTION INTRAVENOUS CONTINUOUS
Status: DISCONTINUED | OUTPATIENT
Start: 2017-11-12 | End: 2017-11-13

## 2017-11-13 ENCOUNTER — APPOINTMENT (OUTPATIENT)
Dept: GENERAL RADIOLOGY | Facility: HOSPITAL | Age: 62
End: 2017-11-13

## 2017-11-13 ENCOUNTER — APPOINTMENT (OUTPATIENT)
Dept: ULTRASOUND IMAGING | Facility: HOSPITAL | Age: 62
End: 2017-11-13

## 2017-11-13 LAB
ALBUMIN SERPL-MCNC: 3.2 G/DL (ref 3.4–4.8)
ALBUMIN/GLOB SERPL: 0.9 G/DL (ref 1.1–1.8)
ALP SERPL-CCNC: 121 U/L (ref 38–126)
ALT SERPL W P-5'-P-CCNC: 423 U/L (ref 21–72)
ANION GAP SERPL CALCULATED.3IONS-SCNC: 21 MMOL/L (ref 5–15)
AST SERPL-CCNC: 173 U/L (ref 17–59)
BILIRUB SERPL-MCNC: 1.7 MG/DL (ref 0.2–1.3)
BUN BLD-MCNC: 72 MG/DL (ref 7–21)
BUN/CREAT SERPL: 19.9 (ref 7–25)
CALCIUM SPEC-SCNC: 8.9 MG/DL (ref 8.4–10.2)
CHLORIDE SERPL-SCNC: 103 MMOL/L (ref 95–110)
CO2 SERPL-SCNC: 18 MMOL/L (ref 22–31)
CREAT BLD-MCNC: 3.62 MG/DL (ref 0.7–1.3)
DEPRECATED RDW RBC AUTO: 50.8 FL (ref 35.1–43.9)
ERYTHROCYTE [DISTWIDTH] IN BLOOD BY AUTOMATED COUNT: 18.8 % (ref 11.5–14.5)
GFR SERPL CREATININE-BSD FRML MDRD: 17 ML/MIN/1.73
GLOBULIN UR ELPH-MCNC: 3.5 GM/DL (ref 2.3–3.5)
GLUCOSE BLD-MCNC: 194 MG/DL (ref 60–100)
GLUCOSE BLDC GLUCOMTR-MCNC: 168 MG/DL (ref 70–130)
HCT VFR BLD AUTO: 27.4 % (ref 39–49)
HGB BLD-MCNC: 9 G/DL (ref 13.7–17.3)
MAGNESIUM SERPL-MCNC: 2.3 MG/DL (ref 1.6–2.3)
MCH RBC QN AUTO: 28.2 PG (ref 26.5–34)
MCHC RBC AUTO-ENTMCNC: 32.8 G/DL (ref 31.5–36.3)
MCV RBC AUTO: 85.9 FL (ref 80–98)
PHOSPHATE SERPL-MCNC: 8.3 MG/DL (ref 2.4–4.4)
PLATELET # BLD AUTO: 74 10*3/MM3 (ref 150–450)
PMV BLD AUTO: 11.8 FL (ref 8–12)
POTASSIUM BLD-SCNC: 4.4 MMOL/L (ref 3.5–5.1)
PROT SERPL-MCNC: 6.7 G/DL (ref 6.3–8.6)
RBC # BLD AUTO: 3.19 10*6/MM3 (ref 4.37–5.74)
SODIUM BLD-SCNC: 142 MMOL/L (ref 137–145)
WBC NRBC COR # BLD: 17.41 10*3/MM3 (ref 3.2–9.8)

## 2017-11-13 PROCEDURE — 93971 EXTREMITY STUDY: CPT

## 2017-11-13 PROCEDURE — 71010 HC CHEST PA OR AP: CPT

## 2017-11-13 PROCEDURE — 25010000002 CEFTRIAXONE PER 250 MG: Performed by: INTERNAL MEDICINE

## 2017-11-13 PROCEDURE — 83735 ASSAY OF MAGNESIUM: CPT | Performed by: INTERNAL MEDICINE

## 2017-11-13 PROCEDURE — 97110 THERAPEUTIC EXERCISES: CPT

## 2017-11-13 PROCEDURE — 25010000002 METHYLPREDNISOLONE PER 125 MG: Performed by: INTERNAL MEDICINE

## 2017-11-13 PROCEDURE — 84100 ASSAY OF PHOSPHORUS: CPT | Performed by: INTERNAL MEDICINE

## 2017-11-13 PROCEDURE — 85027 COMPLETE CBC AUTOMATED: CPT | Performed by: INTERNAL MEDICINE

## 2017-11-13 PROCEDURE — 25010000002 LORAZEPAM PER 2 MG: Performed by: NURSE PRACTITIONER

## 2017-11-13 PROCEDURE — 80053 COMPREHEN METABOLIC PANEL: CPT | Performed by: INTERNAL MEDICINE

## 2017-11-13 PROCEDURE — 63510000001 EPOETIN ALFA PER 1000 UNITS: Performed by: INTERNAL MEDICINE

## 2017-11-13 RX ORDER — SODIUM CHLORIDE 9 MG/ML
50 INJECTION, SOLUTION INTRAVENOUS CONTINUOUS
Status: DISCONTINUED | OUTPATIENT
Start: 2017-11-13 | End: 2017-11-23 | Stop reason: HOSPADM

## 2017-11-13 RX ORDER — SODIUM CHLORIDE 9 MG/ML
70 INJECTION, SOLUTION INTRAVENOUS CONTINUOUS
Status: DISCONTINUED | OUTPATIENT
Start: 2017-11-13 | End: 2017-11-13

## 2017-11-13 RX ORDER — METHYLPREDNISOLONE SODIUM SUCCINATE 125 MG/2ML
80 INJECTION, POWDER, LYOPHILIZED, FOR SOLUTION INTRAMUSCULAR; INTRAVENOUS EVERY 12 HOURS
Status: DISCONTINUED | OUTPATIENT
Start: 2017-11-14 | End: 2017-11-23 | Stop reason: HOSPADM

## 2017-11-13 RX ORDER — LORAZEPAM 2 MG/ML
0.5 INJECTION INTRAMUSCULAR EVERY 6 HOURS PRN
Status: DISCONTINUED | OUTPATIENT
Start: 2017-11-13 | End: 2017-11-23 | Stop reason: HOSPADM

## 2017-11-14 LAB — GLUCOSE BLDC GLUCOMTR-MCNC: 125 MG/DL (ref 70–130)

## 2017-11-14 PROCEDURE — 25010000002 CEFTRIAXONE PER 250 MG: Performed by: INTERNAL MEDICINE

## 2017-11-14 PROCEDURE — 25010000002 METHYLPREDNISOLONE PER 125 MG: Performed by: NURSE PRACTITIONER

## 2017-11-14 PROCEDURE — 97530 THERAPEUTIC ACTIVITIES: CPT

## 2017-11-14 PROCEDURE — 82962 GLUCOSE BLOOD TEST: CPT

## 2017-11-14 RX ORDER — HEPARIN SODIUM,PORCINE 10 UNIT/ML
30 VIAL (ML) INTRAVENOUS EVERY 8 HOURS SCHEDULED
Status: DISCONTINUED | OUTPATIENT
Start: 2017-11-14 | End: 2017-11-23 | Stop reason: HOSPADM

## 2017-11-15 LAB
ALBUMIN SERPL-MCNC: 2.8 G/DL (ref 3.4–4.8)
ALBUMIN/GLOB SERPL: 0.8 G/DL (ref 1.1–1.8)
ALP SERPL-CCNC: 120 U/L (ref 38–126)
ALT SERPL W P-5'-P-CCNC: 280 U/L (ref 21–72)
ANION GAP SERPL CALCULATED.3IONS-SCNC: 17 MMOL/L (ref 5–15)
ANISOCYTOSIS BLD QL: NORMAL
AST SERPL-CCNC: 88 U/L (ref 17–59)
BILIRUB SERPL-MCNC: 1.6 MG/DL (ref 0.2–1.3)
BUN BLD-MCNC: 45 MG/DL (ref 7–21)
BUN/CREAT SERPL: 15.6 (ref 7–25)
CALCIUM SPEC-SCNC: 8.2 MG/DL (ref 8.4–10.2)
CHLORIDE SERPL-SCNC: 101 MMOL/L (ref 95–110)
CO2 SERPL-SCNC: 24 MMOL/L (ref 22–31)
CREAT BLD-MCNC: 2.88 MG/DL (ref 0.7–1.3)
DEPRECATED RDW RBC AUTO: 60.3 FL (ref 35.1–43.9)
ERYTHROCYTE [DISTWIDTH] IN BLOOD BY AUTOMATED COUNT: 20.2 % (ref 11.5–14.5)
GFR SERPL CREATININE-BSD FRML MDRD: 22 ML/MIN/1.73
GLOBULIN UR ELPH-MCNC: 3.4 GM/DL (ref 2.3–3.5)
GLUCOSE BLD-MCNC: 129 MG/DL (ref 60–100)
HCT VFR BLD AUTO: 29.2 % (ref 39–49)
HGB BLD-MCNC: 9.4 G/DL (ref 13.7–17.3)
MAGNESIUM SERPL-MCNC: 2 MG/DL (ref 1.6–2.3)
MCH RBC QN AUTO: 28.4 PG (ref 26.5–34)
MCHC RBC AUTO-ENTMCNC: 32.2 G/DL (ref 31.5–36.3)
MCV RBC AUTO: 88.2 FL (ref 80–98)
PLATELET # BLD AUTO: 46 10*3/MM3 (ref 150–450)
PMV BLD AUTO: 11.5 FL (ref 8–12)
POTASSIUM BLD-SCNC: 3.4 MMOL/L (ref 3.5–5.1)
PROT SERPL-MCNC: 6.2 G/DL (ref 6.3–8.6)
RBC # BLD AUTO: 3.31 10*6/MM3 (ref 4.37–5.74)
SMALL PLATELETS BLD QL SMEAR: NORMAL
SODIUM BLD-SCNC: 142 MMOL/L (ref 137–145)
WBC MORPH BLD: NORMAL
WBC NRBC COR # BLD: 11.57 10*3/MM3 (ref 3.2–9.8)

## 2017-11-15 PROCEDURE — 97110 THERAPEUTIC EXERCISES: CPT

## 2017-11-15 PROCEDURE — 25010000002 CEFTRIAXONE PER 250 MG: Performed by: INTERNAL MEDICINE

## 2017-11-15 PROCEDURE — 85007 BL SMEAR W/DIFF WBC COUNT: CPT | Performed by: INTERNAL MEDICINE

## 2017-11-15 PROCEDURE — 25010000002 METHYLPREDNISOLONE PER 125 MG: Performed by: NURSE PRACTITIONER

## 2017-11-15 PROCEDURE — 80053 COMPREHEN METABOLIC PANEL: CPT | Performed by: INTERNAL MEDICINE

## 2017-11-15 PROCEDURE — 25010000002 HEPARIN LOCK FLUSH 10 UNIT/ML SOLUTION: Performed by: INTERNAL MEDICINE

## 2017-11-15 PROCEDURE — 25010000003 POTASSIUM CHLORIDE 10 MEQ/100ML SOLUTION: Performed by: INTERNAL MEDICINE

## 2017-11-15 PROCEDURE — 85027 COMPLETE CBC AUTOMATED: CPT | Performed by: INTERNAL MEDICINE

## 2017-11-15 PROCEDURE — 83735 ASSAY OF MAGNESIUM: CPT | Performed by: INTERNAL MEDICINE

## 2017-11-15 RX ORDER — POTASSIUM CHLORIDE 7.45 MG/ML
10 INJECTION INTRAVENOUS
Status: DISPENSED | OUTPATIENT
Start: 2017-11-15 | End: 2017-11-15

## 2017-11-16 ENCOUNTER — APPOINTMENT (OUTPATIENT)
Dept: CT IMAGING | Facility: HOSPITAL | Age: 62
End: 2017-11-16

## 2017-11-16 ENCOUNTER — APPOINTMENT (OUTPATIENT)
Dept: GENERAL RADIOLOGY | Facility: HOSPITAL | Age: 62
End: 2017-11-16

## 2017-11-16 LAB
ALBUMIN SERPL-MCNC: 3 G/DL (ref 3.4–4.8)
ALBUMIN/GLOB SERPL: 0.8 G/DL (ref 1.1–1.8)
ALP SERPL-CCNC: 133 U/L (ref 38–126)
ALT SERPL W P-5'-P-CCNC: 219 U/L (ref 21–72)
ANION GAP SERPL CALCULATED.3IONS-SCNC: 19 MMOL/L (ref 5–15)
ANION GAP SERPL CALCULATED.3IONS-SCNC: 20 MMOL/L (ref 5–15)
ANISOCYTOSIS BLD QL: NORMAL
ARTERIAL PATENCY WRIST A: ABNORMAL
ARTERIAL PATENCY WRIST A: ABNORMAL
AST SERPL-CCNC: 65 U/L (ref 17–59)
ATMOSPHERIC PRESS: ABNORMAL MMHG
ATMOSPHERIC PRESS: ABNORMAL MMHG
BASE EXCESS BLDA CALC-SCNC: -5.6 MMOL/L (ref -2.4–2.4)
BASE EXCESS BLDA CALC-SCNC: -7 MMOL/L (ref -2.4–2.4)
BDY SITE: ABNORMAL
BDY SITE: ABNORMAL
BILIRUB SERPL-MCNC: 1.9 MG/DL (ref 0.2–1.3)
BUN BLD-MCNC: 55 MG/DL (ref 7–21)
BUN BLD-MCNC: 59 MG/DL (ref 7–21)
BUN/CREAT SERPL: 17.7 (ref 7–25)
BUN/CREAT SERPL: 17.7 (ref 7–25)
CA-I BLD-MCNC: 4.5 MG/DL (ref 4.5–4.9)
CA-I BLD-MCNC: 4.5 MG/DL (ref 4.5–4.9)
CALCIUM SPEC-SCNC: 8.5 MG/DL (ref 8.4–10.2)
CALCIUM SPEC-SCNC: 8.8 MG/DL (ref 8.4–10.2)
CHLORIDE SERPL-SCNC: 103 MMOL/L (ref 95–110)
CHLORIDE SERPL-SCNC: 103 MMOL/L (ref 95–110)
CO2 BLDA-SCNC: 20.4 MMOL/L (ref 23–27)
CO2 BLDA-SCNC: 20.5 MMOL/L (ref 23–27)
CO2 SERPL-SCNC: 20 MMOL/L (ref 22–31)
CO2 SERPL-SCNC: 21 MMOL/L (ref 22–31)
CREAT BLD-MCNC: 3.11 MG/DL (ref 0.7–1.3)
CREAT BLD-MCNC: 3.33 MG/DL (ref 0.7–1.3)
DEPRECATED RDW RBC AUTO: 63.8 FL (ref 35.1–43.9)
DEPRECATED RDW RBC AUTO: 64.5 FL (ref 35.1–43.9)
ERYTHROCYTE [DISTWIDTH] IN BLOOD BY AUTOMATED COUNT: 20.8 % (ref 11.5–14.5)
ERYTHROCYTE [DISTWIDTH] IN BLOOD BY AUTOMATED COUNT: 20.9 % (ref 11.5–14.5)
GFR SERPL CREATININE-BSD FRML MDRD: 19 ML/MIN/1.73 (ref 49–113)
GFR SERPL CREATININE-BSD FRML MDRD: 21 ML/MIN/1.73 (ref 49–113)
GLOBULIN UR ELPH-MCNC: 3.7 GM/DL (ref 2.3–3.5)
GLUCOSE BLD-MCNC: 142 MG/DL (ref 60–100)
GLUCOSE BLD-MCNC: 164 MG/DL (ref 60–100)
GLUCOSE BLDA-MCNC: 169 MMOL/L
GLUCOSE BLDA-MCNC: 207 MMOL/L
HCO3 BLDA-SCNC: 19.2 MMOL/L (ref 22–26)
HCO3 BLDA-SCNC: 19.3 MMOL/L (ref 22–26)
HCT VFR BLD AUTO: 29.8 % (ref 39–49)
HCT VFR BLD AUTO: 32 % (ref 39–49)
HCT VFR BLD AUTO: 33 % (ref 39–49)
HCT VFR BLD CALC: 33 % (ref 40–54)
HCT VFR BLD CALC: 34 % (ref 40–54)
HGB BLD-MCNC: 10.3 G/DL (ref 13.7–17.3)
HGB BLD-MCNC: 10.6 G/DL (ref 13.7–17.3)
HGB BLD-MCNC: 9.6 G/DL (ref 13.7–17.3)
HGB BLDA-MCNC: 11.3 G/DL (ref 14–18)
HGB BLDA-MCNC: 11.7 G/DL (ref 14–18)
HYPOCHROMIA BLD QL: NORMAL
MAGNESIUM SERPL-MCNC: 2 MG/DL (ref 1.6–2.3)
MCH RBC QN AUTO: 28.3 PG (ref 26.5–34)
MCH RBC QN AUTO: 28.6 PG (ref 26.5–34)
MCHC RBC AUTO-ENTMCNC: 32.1 G/DL (ref 31.5–36.3)
MCHC RBC AUTO-ENTMCNC: 32.2 G/DL (ref 31.5–36.3)
MCV RBC AUTO: 88 FL (ref 80–98)
MCV RBC AUTO: 88.9 FL (ref 80–98)
MODALITY: ABNORMAL
MODALITY: ABNORMAL
PCO2 BLDA: 35.7 MM HG (ref 35–45)
PCO2 BLDA: 41.4 MM HG (ref 35–45)
PH BLDA: 7.29 PH UNITS (ref 7.35–7.45)
PH BLDA: 7.35 PH UNITS (ref 7.35–7.45)
PLATELET # BLD AUTO: 60 10*3/MM3 (ref 150–450)
PLATELET # BLD AUTO: 71 10*3/MM3 (ref 150–450)
PMV BLD AUTO: 11 FL (ref 8–12)
PMV BLD AUTO: 11.1 FL (ref 8–12)
PO2 BLDA: 465.7 MM HG (ref 80–105)
PO2 BLDA: 88.6 MM HG (ref 80–105)
POTASSIUM BLD-SCNC: 3.9 MMOL/L (ref 3.5–5.1)
POTASSIUM BLD-SCNC: 4 MMOL/L (ref 3.5–5.1)
POTASSIUM BLDA-SCNC: 3.91 MMOL/L (ref 3.6–4.9)
POTASSIUM BLDA-SCNC: 5.06 MMOL/L (ref 3.6–4.9)
PROT SERPL-MCNC: 6.7 G/DL (ref 6.3–8.6)
RBC # BLD AUTO: 3.6 10*6/MM3 (ref 4.37–5.74)
RBC # BLD AUTO: 3.75 10*6/MM3 (ref 4.37–5.74)
SAO2 % BLDCOA: 95.7 % (ref 94–100)
SAO2 % BLDCOA: 99.7 %
SMALL PLATELETS BLD QL SMEAR: NORMAL
SODIUM BLD-SCNC: 143 MMOL/L (ref 137–145)
SODIUM BLD-SCNC: 143 MMOL/L (ref 137–145)
SODIUM BLDA-SCNC: 143.8 MMOL/L (ref 138–146)
SODIUM BLDA-SCNC: 143.9 MMOL/L (ref 138–146)
WBC MORPH BLD: NORMAL
WBC NRBC COR # BLD: 15.04 10*3/MM3 (ref 3.2–9.8)
WBC NRBC COR # BLD: 18.51 10*3/MM3 (ref 3.2–9.8)

## 2017-11-16 PROCEDURE — 71010 HC CHEST PA OR AP: CPT

## 2017-11-16 PROCEDURE — 82803 BLOOD GASES ANY COMBINATION: CPT | Performed by: NURSE PRACTITIONER

## 2017-11-16 PROCEDURE — 25010000002 AMIODARONE IN DEXTROSE 5% 360-4.14 MG/200ML-% SOLUTION: Performed by: NURSE PRACTITIONER

## 2017-11-16 PROCEDURE — 80048 BASIC METABOLIC PNL TOTAL CA: CPT | Performed by: NURSE PRACTITIONER

## 2017-11-16 PROCEDURE — 93010 ELECTROCARDIOGRAM REPORT: CPT | Performed by: INTERNAL MEDICINE

## 2017-11-16 PROCEDURE — 97110 THERAPEUTIC EXERCISES: CPT

## 2017-11-16 PROCEDURE — 93005 ELECTROCARDIOGRAM TRACING: CPT | Performed by: NURSE PRACTITIONER

## 2017-11-16 PROCEDURE — 25010000002 CEFTRIAXONE PER 250 MG: Performed by: INTERNAL MEDICINE

## 2017-11-16 PROCEDURE — 25010000002 AMIODARONE IN DEXTROSE 5% 150-4.21 MG/100ML-% SOLUTION: Performed by: NURSE PRACTITIONER

## 2017-11-16 PROCEDURE — 85027 COMPLETE CBC AUTOMATED: CPT | Performed by: NURSE PRACTITIONER

## 2017-11-16 PROCEDURE — 25010000002 HEPARIN LOCK FLUSH 10 UNIT/ML SOLUTION: Performed by: INTERNAL MEDICINE

## 2017-11-16 PROCEDURE — 85007 BL SMEAR W/DIFF WBC COUNT: CPT | Performed by: NURSE PRACTITIONER

## 2017-11-16 PROCEDURE — 70450 CT HEAD/BRAIN W/O DYE: CPT

## 2017-11-16 PROCEDURE — 83735 ASSAY OF MAGNESIUM: CPT | Performed by: NURSE PRACTITIONER

## 2017-11-16 PROCEDURE — 25010000002 METHYLPREDNISOLONE PER 125 MG: Performed by: NURSE PRACTITIONER

## 2017-11-16 PROCEDURE — 85014 HEMATOCRIT: CPT | Performed by: NURSE PRACTITIONER

## 2017-11-16 PROCEDURE — 74176 CT ABD & PELVIS W/O CONTRAST: CPT

## 2017-11-16 PROCEDURE — 80053 COMPREHEN METABOLIC PANEL: CPT | Performed by: NURSE PRACTITIONER

## 2017-11-16 PROCEDURE — 25010000002 LORAZEPAM PER 2 MG: Performed by: NURSE PRACTITIONER

## 2017-11-16 PROCEDURE — 85018 HEMOGLOBIN: CPT | Performed by: NURSE PRACTITIONER

## 2017-11-17 LAB
ABO GROUP BLD: NORMAL
ALBUMIN SERPL-MCNC: 2.7 G/DL (ref 3.4–4.8)
ALBUMIN/GLOB SERPL: 0.8 G/DL (ref 1.1–1.8)
ALP SERPL-CCNC: 102 U/L (ref 38–126)
ALT SERPL W P-5'-P-CCNC: 168 U/L (ref 21–72)
ANION GAP SERPL CALCULATED.3IONS-SCNC: 19 MMOL/L (ref 5–15)
ANISOCYTOSIS BLD QL: ABNORMAL
ARTERIAL PATENCY WRIST A: ABNORMAL
AST SERPL-CCNC: 43 U/L (ref 17–59)
ATMOSPHERIC PRESS: ABNORMAL MMHG
BASE EXCESS BLDA CALC-SCNC: -5.9 MMOL/L (ref -2.4–2.4)
BDY SITE: ABNORMAL
BILIRUB SERPL-MCNC: 1.5 MG/DL (ref 0.2–1.3)
BLD GP AB SCN SERPL QL: NEGATIVE
BUN BLD-MCNC: 66 MG/DL (ref 7–21)
BUN/CREAT SERPL: 18.3 (ref 7–25)
CA-I BLD-MCNC: 4.6 MG/DL (ref 4.5–4.9)
CALCIUM SPEC-SCNC: 8.4 MG/DL (ref 8.4–10.2)
CHLORIDE SERPL-SCNC: 105 MMOL/L (ref 95–110)
CO2 BLDA-SCNC: 20.6 MMOL/L (ref 23–27)
CO2 SERPL-SCNC: 21 MMOL/L (ref 22–31)
CREAT BLD-MCNC: 3.6 MG/DL (ref 0.7–1.3)
DEPRECATED RDW RBC AUTO: 62.7 FL (ref 35.1–43.9)
DEPRECATED RDW RBC AUTO: 63.1 FL (ref 35.1–43.9)
DEPRECATED RDW RBC AUTO: 64.2 FL (ref 35.1–43.9)
ERYTHROCYTE [DISTWIDTH] IN BLOOD BY AUTOMATED COUNT: 20.4 % (ref 11.5–14.5)
ERYTHROCYTE [DISTWIDTH] IN BLOOD BY AUTOMATED COUNT: 20.5 % (ref 11.5–14.5)
ERYTHROCYTE [DISTWIDTH] IN BLOOD BY AUTOMATED COUNT: 20.7 % (ref 11.5–14.5)
GFR SERPL CREATININE-BSD FRML MDRD: 17 ML/MIN/1.73 (ref 49–113)
GLOBULIN UR ELPH-MCNC: 3.3 GM/DL (ref 2.3–3.5)
GLUCOSE BLD-MCNC: 175 MG/DL (ref 60–100)
GLUCOSE BLDA-MCNC: 174 MMOL/L
HCO3 BLDA-SCNC: 19.4 MMOL/L (ref 22–26)
HCT VFR BLD AUTO: 28.2 % (ref 39–49)
HCT VFR BLD AUTO: 28.2 % (ref 39–49)
HCT VFR BLD AUTO: 28.5 % (ref 39–49)
HCT VFR BLD AUTO: 28.8 % (ref 39–49)
HCT VFR BLD CALC: 31 % (ref 40–54)
HGB BLD-MCNC: 9 G/DL (ref 13.7–17.3)
HGB BLD-MCNC: 9.3 G/DL (ref 13.7–17.3)
HGB BLD-MCNC: 9.3 G/DL (ref 13.7–17.3)
HGB BLD-MCNC: 9.4 G/DL (ref 13.7–17.3)
HGB BLDA-MCNC: 10.7 G/DL (ref 14–18)
HYPOCHROMIA BLD QL: ABNORMAL
LYMPHOCYTES # BLD MANUAL: 0.25 10*3/MM3 (ref 0.6–4.2)
LYMPHOCYTES NFR BLD MANUAL: 3 % (ref 0–12)
LYMPHOCYTES NFR BLD MANUAL: 3 % (ref 10–50)
Lab: NORMAL
MAGNESIUM SERPL-MCNC: 1.9 MG/DL (ref 1.6–2.3)
MCH RBC QN AUTO: 28 PG (ref 26.5–34)
MCH RBC QN AUTO: 28.7 PG (ref 26.5–34)
MCH RBC QN AUTO: 29.2 PG (ref 26.5–34)
MCHC RBC AUTO-ENTMCNC: 31.6 G/DL (ref 31.5–36.3)
MCHC RBC AUTO-ENTMCNC: 32.6 G/DL (ref 31.5–36.3)
MCHC RBC AUTO-ENTMCNC: 33 G/DL (ref 31.5–36.3)
MCV RBC AUTO: 88.1 FL (ref 80–98)
MCV RBC AUTO: 88.5 FL (ref 80–98)
MCV RBC AUTO: 88.7 FL (ref 80–98)
MODALITY: ABNORMAL
MONOCYTES # BLD AUTO: 0.25 10*3/MM3 (ref 0–0.9)
NEUTROPHILS # BLD AUTO: 7.87 10*3/MM3 (ref 2–8.6)
NEUTROPHILS NFR BLD MANUAL: 91 % (ref 37–80)
NEUTS BAND NFR BLD MANUAL: 3 % (ref 0–5)
PCO2 BLDA: 37.8 MM HG (ref 35–45)
PH BLDA: 7.33 PH UNITS (ref 7.35–7.45)
PLATELET # BLD AUTO: 24 10*3/MM3 (ref 150–450)
PLATELET # BLD AUTO: 34 10*3/MM3 (ref 150–450)
PLATELET # BLD AUTO: 59 10*3/MM3 (ref 150–450)
PMV BLD AUTO: 10.7 FL (ref 8–12)
PMV BLD AUTO: 9.8 FL (ref 8–12)
PMV BLD AUTO: ABNORMAL FL (ref 8–12)
PO2 BLDA: 45 MM HG (ref 80–105)
POTASSIUM BLD-SCNC: 3.9 MMOL/L (ref 3.5–5.1)
POTASSIUM BLDA-SCNC: 4.23 MMOL/L (ref 3.6–4.9)
PROT SERPL-MCNC: 6 G/DL (ref 6.3–8.6)
RBC # BLD AUTO: 3.18 10*6/MM3 (ref 4.37–5.74)
RBC # BLD AUTO: 3.22 10*6/MM3 (ref 4.37–5.74)
RBC # BLD AUTO: 3.27 10*6/MM3 (ref 4.37–5.74)
RH BLD: POSITIVE
SAO2 % BLDCOA: 74.2 %
SMALL PLATELETS BLD QL SMEAR: ABNORMAL
SODIUM BLD-SCNC: 145 MMOL/L (ref 137–145)
SODIUM BLDA-SCNC: 143.9 MMOL/L (ref 138–146)
WBC MORPH BLD: NORMAL
WBC NRBC COR # BLD: 10.57 10*3/MM3 (ref 3.2–9.8)
WBC NRBC COR # BLD: 8.37 10*3/MM3 (ref 3.2–9.8)
WBC NRBC COR # BLD: 9.63 10*3/MM3 (ref 3.2–9.8)

## 2017-11-17 PROCEDURE — P9035 PLATELET PHERES LEUKOREDUCED: HCPCS

## 2017-11-17 PROCEDURE — 86900 BLOOD TYPING SEROLOGIC ABO: CPT | Performed by: NURSE PRACTITIONER

## 2017-11-17 PROCEDURE — 63510000001 EPOETIN ALFA PER 1000 UNITS: Performed by: INTERNAL MEDICINE

## 2017-11-17 PROCEDURE — 83735 ASSAY OF MAGNESIUM: CPT | Performed by: INTERNAL MEDICINE

## 2017-11-17 PROCEDURE — 25010000002 CEFTRIAXONE PER 250 MG: Performed by: INTERNAL MEDICINE

## 2017-11-17 PROCEDURE — 80053 COMPREHEN METABOLIC PANEL: CPT | Performed by: INTERNAL MEDICINE

## 2017-11-17 PROCEDURE — 85014 HEMATOCRIT: CPT | Performed by: NURSE PRACTITIONER

## 2017-11-17 PROCEDURE — 85018 HEMOGLOBIN: CPT | Performed by: NURSE PRACTITIONER

## 2017-11-17 PROCEDURE — 86901 BLOOD TYPING SEROLOGIC RH(D): CPT | Performed by: NURSE PRACTITIONER

## 2017-11-17 PROCEDURE — 86850 RBC ANTIBODY SCREEN: CPT | Performed by: NURSE PRACTITIONER

## 2017-11-17 PROCEDURE — 25010000002 METHYLPREDNISOLONE PER 125 MG: Performed by: NURSE PRACTITIONER

## 2017-11-17 PROCEDURE — 85027 COMPLETE CBC AUTOMATED: CPT | Performed by: INTERNAL MEDICINE

## 2017-11-17 PROCEDURE — 25010000002 AMIODARONE IN DEXTROSE 5% 360-4.14 MG/200ML-% SOLUTION: Performed by: NURSE PRACTITIONER

## 2017-11-17 PROCEDURE — 82803 BLOOD GASES ANY COMBINATION: CPT | Performed by: INTERNAL MEDICINE

## 2017-11-17 PROCEDURE — 85027 COMPLETE CBC AUTOMATED: CPT | Performed by: NURSE PRACTITIONER

## 2017-11-17 RX ORDER — SODIUM CHLORIDE 9 MG/ML
INJECTION, SOLUTION INTRAVENOUS
Status: DISPENSED
Start: 2017-11-17 | End: 2017-11-18

## 2017-11-17 NOTE — PROGRESS NOTES
Cardiopulmonary arrest yesterday, requiring resuscitation, intubation.  Ventricular tachycardia, on amiodarone infusion.  Not stable for transport.  Will defer tunnel catheter placement until stable.    MAINOR Clancy MD

## 2017-11-17 NOTE — PROGRESS NOTES
Detailed discussion with Héctor Koo regarding situation, options and plans.  ALLAN requiring intermediate term hemodialysis.  Placement of  tunnelled hemodialysis catheter with cuff is advisable.  Place catheter into vein in neck, tunneled under skin exiting onto anterior chest wall, using ultrasound and fluoroscopic guidance.    Risks including, but not limited to Mortality, Major morbidity <1%, bleeding, transfusion, infection, pneumothorax, blood vessel injury.  Benefits: access for hemodialysis.  Options:  direct cannulation,  tunnel catheter discussed.  Understands and wishes to proceed.      Placement  tunnel catheter.  Vascular ultrasound guidance.  Fluoroscopic guidance.  Local, IV sedation.  11/17/2017.

## 2017-11-18 ENCOUNTER — APPOINTMENT (OUTPATIENT)
Dept: GENERAL RADIOLOGY | Facility: HOSPITAL | Age: 62
End: 2017-11-18

## 2017-11-18 LAB
ARTERIAL PATENCY WRIST A: ABNORMAL
ATMOSPHERIC PRESS: ABNORMAL MMHG
BASE EXCESS BLDA CALC-SCNC: -1.4 MMOL/L (ref -2.4–2.4)
BDY SITE: ABNORMAL
CA-I BLD-MCNC: 4.5 MG/DL (ref 4.5–4.9)
CO2 BLDA-SCNC: 23.7 MMOL/L (ref 23–27)
DEPRECATED RDW RBC AUTO: 64.7 FL (ref 35.1–43.9)
ERYTHROCYTE [DISTWIDTH] IN BLOOD BY AUTOMATED COUNT: 20.7 % (ref 11.5–14.5)
GLUCOSE BLDA-MCNC: 149 MMOL/L
HCO3 BLDA-SCNC: 22.6 MMOL/L (ref 22–26)
HCT VFR BLD AUTO: 28 % (ref 39–49)
HCT VFR BLD AUTO: 29.3 % (ref 39–49)
HCT VFR BLD AUTO: 31.9 % (ref 39–49)
HCT VFR BLD CALC: 28 % (ref 40–54)
HGB BLD-MCNC: 10.5 G/DL (ref 13.7–17.3)
HGB BLD-MCNC: 9.2 G/DL (ref 13.7–17.3)
HGB BLD-MCNC: 9.5 G/DL (ref 13.7–17.3)
HGB BLDA-MCNC: 9.4 G/DL (ref 14–18)
MCH RBC QN AUTO: 29.1 PG (ref 26.5–34)
MCHC RBC AUTO-ENTMCNC: 32.9 G/DL (ref 31.5–36.3)
MCV RBC AUTO: 88.6 FL (ref 80–98)
MODALITY: ABNORMAL
PCO2 BLDA: 34.9 MM HG (ref 35–45)
PH BLDA: 7.43 PH UNITS (ref 7.35–7.45)
PLATELET # BLD AUTO: 54 10*3/MM3 (ref 150–450)
PMV BLD AUTO: 9.1 FL (ref 8–12)
PO2 BLDA: 82.7 MM HG (ref 80–105)
POTASSIUM BLDA-SCNC: 3.53 MMOL/L (ref 3.6–4.9)
RBC # BLD AUTO: 3.16 10*6/MM3 (ref 4.37–5.74)
SAO2 % BLDCOA: 95.4 % (ref 94–100)
SODIUM BLDA-SCNC: 139.9 MMOL/L (ref 138–146)
WBC NRBC COR # BLD: 11.25 10*3/MM3 (ref 3.2–9.8)

## 2017-11-18 PROCEDURE — 25010000002 HYDRALAZINE PER 20 MG: Performed by: INTERNAL MEDICINE

## 2017-11-18 PROCEDURE — 25010000002 LORAZEPAM PER 2 MG: Performed by: NURSE PRACTITIONER

## 2017-11-18 PROCEDURE — 71010 HC CHEST PA OR AP: CPT

## 2017-11-18 PROCEDURE — 85027 COMPLETE CBC AUTOMATED: CPT | Performed by: INTERNAL MEDICINE

## 2017-11-18 PROCEDURE — 82803 BLOOD GASES ANY COMBINATION: CPT | Performed by: INTERNAL MEDICINE

## 2017-11-18 PROCEDURE — 25010000002 CEFTRIAXONE PER 250 MG: Performed by: INTERNAL MEDICINE

## 2017-11-18 PROCEDURE — 85018 HEMOGLOBIN: CPT | Performed by: NURSE PRACTITIONER

## 2017-11-18 PROCEDURE — 85014 HEMATOCRIT: CPT | Performed by: NURSE PRACTITIONER

## 2017-11-18 PROCEDURE — 25010000002 METHYLPREDNISOLONE PER 125 MG: Performed by: NURSE PRACTITIONER

## 2017-11-18 PROCEDURE — 25010000002 AMIODARONE IN DEXTROSE 5% 360-4.14 MG/200ML-% SOLUTION: Performed by: NURSE PRACTITIONER

## 2017-11-18 RX ORDER — HYDRALAZINE HYDROCHLORIDE 20 MG/ML
10 INJECTION INTRAMUSCULAR; INTRAVENOUS EVERY 6 HOURS PRN
Status: DISCONTINUED | OUTPATIENT
Start: 2017-11-18 | End: 2017-11-23 | Stop reason: HOSPADM

## 2017-11-19 LAB
ABO + RH BLD: NORMAL
ALBUMIN SERPL-MCNC: 3 G/DL (ref 3.4–4.8)
ALBUMIN/GLOB SERPL: 0.9 G/DL (ref 1.1–1.8)
ALP SERPL-CCNC: 124 U/L (ref 38–126)
ALT SERPL W P-5'-P-CCNC: 140 U/L (ref 21–72)
ANION GAP SERPL CALCULATED.3IONS-SCNC: 17 MMOL/L (ref 5–15)
ARTERIAL PATENCY WRIST A: ABNORMAL
AST SERPL-CCNC: 61 U/L (ref 17–59)
ATMOSPHERIC PRESS: ABNORMAL MMHG
BASE EXCESS BLDA CALC-SCNC: -2.8 MMOL/L (ref -2.4–2.4)
BDY SITE: ABNORMAL
BH BB BLOOD EXPIRATION DATE: NORMAL
BH BB BLOOD TYPE BARCODE: 9500
BH BB DISPENSE STATUS: NORMAL
BH BB PRODUCT CODE: NORMAL
BH BB UNIT NUMBER: NORMAL
BILIRUB SERPL-MCNC: 1.6 MG/DL (ref 0.2–1.3)
BUN BLD-MCNC: 47 MG/DL (ref 7–21)
BUN/CREAT SERPL: 15.3 (ref 7–25)
CA-I BLD-MCNC: 4.5 MG/DL (ref 4.5–4.9)
CALCIUM SPEC-SCNC: 8.7 MG/DL (ref 8.4–10.2)
CHLORIDE SERPL-SCNC: 104 MMOL/L (ref 95–110)
CO2 BLDA-SCNC: 22.1 MMOL/L (ref 23–27)
CO2 SERPL-SCNC: 21 MMOL/L (ref 22–31)
CREAT BLD-MCNC: 3.08 MG/DL (ref 0.7–1.3)
DEPRECATED RDW RBC AUTO: 64.3 FL (ref 35.1–43.9)
ERYTHROCYTE [DISTWIDTH] IN BLOOD BY AUTOMATED COUNT: 21 % (ref 11.5–14.5)
GFR SERPL CREATININE-BSD FRML MDRD: 21 ML/MIN/1.73 (ref 49–113)
GLOBULIN UR ELPH-MCNC: 3.4 GM/DL (ref 2.3–3.5)
GLUCOSE BLD-MCNC: 154 MG/DL (ref 60–100)
GLUCOSE BLDA-MCNC: 143 MMOL/L
HCO3 BLDA-SCNC: 21.1 MMOL/L (ref 22–26)
HCT VFR BLD AUTO: 28.1 % (ref 39–49)
HCT VFR BLD AUTO: 29.1 % (ref 39–49)
HCT VFR BLD AUTO: 29.4 % (ref 39–49)
HCT VFR BLD AUTO: 32.1 % (ref 39–49)
HCT VFR BLD CALC: 30 % (ref 40–54)
HGB BLD-MCNC: 10.5 G/DL (ref 13.7–17.3)
HGB BLD-MCNC: 9.3 G/DL (ref 13.7–17.3)
HGB BLD-MCNC: 9.7 G/DL (ref 13.7–17.3)
HGB BLD-MCNC: 9.8 G/DL (ref 13.7–17.3)
HGB BLDA-MCNC: 10.1 G/DL (ref 14–18)
MAGNESIUM SERPL-MCNC: 1.9 MG/DL (ref 1.6–2.3)
MCH RBC QN AUTO: 28.5 PG (ref 26.5–34)
MCHC RBC AUTO-ENTMCNC: 32.7 G/DL (ref 31.5–36.3)
MCV RBC AUTO: 87.2 FL (ref 80–98)
MODALITY: ABNORMAL
PCO2 BLDA: 33.2 MM HG (ref 35–45)
PH BLDA: 7.42 PH UNITS (ref 7.35–7.45)
PLATELET # BLD AUTO: 52 10*3/MM3 (ref 150–450)
PMV BLD AUTO: 10.1 FL (ref 8–12)
PO2 BLDA: 109.3 MM HG (ref 80–105)
POTASSIUM BLD-SCNC: 3.7 MMOL/L (ref 3.5–5.1)
POTASSIUM BLDA-SCNC: 3.39 MMOL/L (ref 3.6–4.9)
PROT SERPL-MCNC: 6.4 G/DL (ref 6.3–8.6)
RBC # BLD AUTO: 3.68 10*6/MM3 (ref 4.37–5.74)
SAO2 % BLDCOA: 97.9 %
SODIUM BLD-SCNC: 142 MMOL/L (ref 137–145)
SODIUM BLDA-SCNC: 140.7 MMOL/L (ref 138–146)
UNIT  ABO: NORMAL
UNIT  RH: NORMAL
WBC NRBC COR # BLD: 12.11 10*3/MM3 (ref 3.2–9.8)

## 2017-11-19 PROCEDURE — 85018 HEMOGLOBIN: CPT | Performed by: NURSE PRACTITIONER

## 2017-11-19 PROCEDURE — 25010000002 AMIODARONE IN DEXTROSE 5% 360-4.14 MG/200ML-% SOLUTION: Performed by: NURSE PRACTITIONER

## 2017-11-19 PROCEDURE — 82803 BLOOD GASES ANY COMBINATION: CPT | Performed by: INTERNAL MEDICINE

## 2017-11-19 PROCEDURE — 85014 HEMATOCRIT: CPT | Performed by: NURSE PRACTITIONER

## 2017-11-19 PROCEDURE — 25010000002 CEFTRIAXONE PER 250 MG: Performed by: INTERNAL MEDICINE

## 2017-11-19 PROCEDURE — 80053 COMPREHEN METABOLIC PANEL: CPT | Performed by: INTERNAL MEDICINE

## 2017-11-19 PROCEDURE — 25010000002 FUROSEMIDE PER 20 MG: Performed by: INTERNAL MEDICINE

## 2017-11-19 PROCEDURE — 83735 ASSAY OF MAGNESIUM: CPT | Performed by: INTERNAL MEDICINE

## 2017-11-19 PROCEDURE — 25010000002 METHYLPREDNISOLONE PER 125 MG: Performed by: NURSE PRACTITIONER

## 2017-11-19 PROCEDURE — 25010000002 HYDRALAZINE PER 20 MG: Performed by: INTERNAL MEDICINE

## 2017-11-19 PROCEDURE — 85027 COMPLETE CBC AUTOMATED: CPT | Performed by: INTERNAL MEDICINE

## 2017-11-19 PROCEDURE — 25010000002 HEPARIN LOCK FLUSH 10 UNIT/ML SOLUTION: Performed by: INTERNAL MEDICINE

## 2017-11-19 RX ORDER — AMIODARONE HYDROCHLORIDE 200 MG/1
400 TABLET ORAL ONCE
Status: DISCONTINUED | OUTPATIENT
Start: 2017-11-19 | End: 2017-11-19

## 2017-11-19 RX ORDER — AMIODARONE HYDROCHLORIDE 200 MG/1
200 TABLET ORAL EVERY 12 HOURS SCHEDULED
Status: DISCONTINUED | OUTPATIENT
Start: 2017-11-20 | End: 2017-11-23 | Stop reason: HOSPADM

## 2017-11-19 RX ORDER — FUROSEMIDE 10 MG/ML
40 INJECTION INTRAMUSCULAR; INTRAVENOUS EVERY 8 HOURS SCHEDULED
Status: DISCONTINUED | OUTPATIENT
Start: 2017-11-19 | End: 2017-11-21 | Stop reason: ALTCHOICE

## 2017-11-20 ENCOUNTER — APPOINTMENT (OUTPATIENT)
Dept: CARDIOLOGY | Facility: HOSPITAL | Age: 62
End: 2017-11-20
Attending: INTERNAL MEDICINE

## 2017-11-20 LAB
ANION GAP SERPL CALCULATED.3IONS-SCNC: 17 MMOL/L (ref 5–15)
ARTERIAL PATENCY WRIST A: ABNORMAL
ATMOSPHERIC PRESS: ABNORMAL MMHG
BASE EXCESS BLDA CALC-SCNC: -4.4 MMOL/L (ref -2.4–2.4)
BDY SITE: ABNORMAL
BUN BLD-MCNC: 52 MG/DL (ref 7–21)
BUN/CREAT SERPL: 14.4 (ref 7–25)
CA-I BLD-MCNC: 4.5 MG/DL (ref 4.5–4.9)
CALCIUM SPEC-SCNC: 8.5 MG/DL (ref 8.4–10.2)
CHLORIDE SERPL-SCNC: 105 MMOL/L (ref 95–110)
CO2 BLDA-SCNC: 20.4 MMOL/L (ref 23–27)
CO2 SERPL-SCNC: 20 MMOL/L (ref 22–31)
CREAT BLD-MCNC: 3.62 MG/DL (ref 0.7–1.3)
GFR SERPL CREATININE-BSD FRML MDRD: 17 ML/MIN/1.73
GLUCOSE BLD-MCNC: 142 MG/DL (ref 60–100)
GLUCOSE BLDA-MCNC: 128 MMOL/L
HCO3 BLDA-SCNC: 19.4 MMOL/L (ref 22–26)
HCT VFR BLD AUTO: 26.3 % (ref 39–49)
HCT VFR BLD AUTO: 26.5 % (ref 39–49)
HCT VFR BLD AUTO: 30.2 % (ref 39–49)
HCT VFR BLD AUTO: 30.5 % (ref 39–49)
HCT VFR BLD CALC: 31 % (ref 40–54)
HGB BLD-MCNC: 10 G/DL (ref 13.7–17.3)
HGB BLD-MCNC: 8.7 G/DL (ref 13.7–17.3)
HGB BLD-MCNC: 8.9 G/DL (ref 13.7–17.3)
HGB BLD-MCNC: 9.9 G/DL (ref 13.7–17.3)
HGB BLDA-MCNC: 10.5 G/DL (ref 14–18)
MODALITY: ABNORMAL
PCO2 BLDA: 31.3 MM HG (ref 35–45)
PH BLDA: 7.41 PH UNITS (ref 7.35–7.45)
PO2 BLDA: 106.5 MM HG (ref 80–105)
POTASSIUM BLD-SCNC: 3.5 MMOL/L (ref 3.5–5.1)
POTASSIUM BLDA-SCNC: 3.42 MMOL/L (ref 3.6–4.9)
SAO2 % BLDCOA: 97.8 % (ref 94–100)
SODIUM BLD-SCNC: 142 MMOL/L (ref 137–145)
SODIUM BLDA-SCNC: 140.6 MMOL/L (ref 138–146)

## 2017-11-20 PROCEDURE — 25010000002 METHYLPREDNISOLONE PER 125 MG: Performed by: NURSE PRACTITIONER

## 2017-11-20 PROCEDURE — 93308 TTE F-UP OR LMTD: CPT

## 2017-11-20 PROCEDURE — 85018 HEMOGLOBIN: CPT | Performed by: NURSE PRACTITIONER

## 2017-11-20 PROCEDURE — 82803 BLOOD GASES ANY COMBINATION: CPT | Performed by: INTERNAL MEDICINE

## 2017-11-20 PROCEDURE — P9046 ALBUMIN (HUMAN), 25%, 20 ML: HCPCS | Performed by: INTERNAL MEDICINE

## 2017-11-20 PROCEDURE — 63510000001 EPOETIN ALFA PER 1000 UNITS: Performed by: INTERNAL MEDICINE

## 2017-11-20 PROCEDURE — 25010000002 CEFTRIAXONE PER 250 MG: Performed by: INTERNAL MEDICINE

## 2017-11-20 PROCEDURE — 93325 DOPPLER ECHO COLOR FLOW MAPG: CPT

## 2017-11-20 PROCEDURE — 85014 HEMATOCRIT: CPT | Performed by: NURSE PRACTITIONER

## 2017-11-20 PROCEDURE — 25010000002 HEPARIN LOCK FLUSH 10 UNIT/ML SOLUTION: Performed by: INTERNAL MEDICINE

## 2017-11-20 PROCEDURE — 25010000002 FUROSEMIDE PER 20 MG: Performed by: INTERNAL MEDICINE

## 2017-11-20 PROCEDURE — 25010000002 ALBUMIN HUMAN 25% PER 50 ML: Performed by: INTERNAL MEDICINE

## 2017-11-20 PROCEDURE — 80048 BASIC METABOLIC PNL TOTAL CA: CPT | Performed by: INTERNAL MEDICINE

## 2017-11-20 RX ORDER — ALBUMIN (HUMAN) 12.5 G/50ML
25 SOLUTION INTRAVENOUS ONCE
Status: DISCONTINUED | OUTPATIENT
Start: 2017-11-20 | End: 2017-11-21

## 2017-11-21 ENCOUNTER — OUTSIDE FACILITY SERVICE (OUTPATIENT)
Dept: ONCOLOGY | Facility: CLINIC | Age: 62
End: 2017-11-21

## 2017-11-21 LAB
ALBUMIN SERPL-MCNC: 2.7 G/DL (ref 3.4–4.8)
ALBUMIN/GLOB SERPL: 0.9 G/DL (ref 1.1–1.8)
ALP SERPL-CCNC: 108 U/L (ref 38–126)
ALT SERPL W P-5'-P-CCNC: 122 U/L (ref 21–72)
ANION GAP SERPL CALCULATED.3IONS-SCNC: 16 MMOL/L (ref 5–15)
ANISOCYTOSIS BLD QL: NORMAL
ARTERIAL PATENCY WRIST A: ABNORMAL
AST SERPL-CCNC: 69 U/L (ref 17–59)
ATMOSPHERIC PRESS: ABNORMAL MMHG
BASE EXCESS BLDA CALC-SCNC: -2 MMOL/L (ref -2.4–2.4)
BDY SITE: ABNORMAL
BILIRUB SERPL-MCNC: 1.4 MG/DL (ref 0.2–1.3)
BUN BLD-MCNC: 32 MG/DL (ref 7–21)
BUN/CREAT SERPL: 13.1 (ref 7–25)
CA-I BLD-MCNC: 4.5 MG/DL (ref 4.5–4.9)
CALCIUM SPEC-SCNC: 8.3 MG/DL (ref 8.4–10.2)
CHLORIDE SERPL-SCNC: 104 MMOL/L (ref 95–110)
CO2 BLDA-SCNC: 23.1 MMOL/L (ref 23–27)
CO2 SERPL-SCNC: 22 MMOL/L (ref 22–31)
CREAT BLD-MCNC: 2.44 MG/DL (ref 0.7–1.3)
DEPRECATED RDW RBC AUTO: 66.3 FL (ref 35.1–43.9)
ERYTHROCYTE [DISTWIDTH] IN BLOOD BY AUTOMATED COUNT: 21.6 % (ref 11.5–14.5)
GFR SERPL CREATININE-BSD FRML MDRD: 27 ML/MIN/1.73
GLOBULIN UR ELPH-MCNC: 3.1 GM/DL (ref 2.3–3.5)
GLUCOSE BLD-MCNC: 115 MG/DL (ref 60–100)
GLUCOSE BLDA-MCNC: 116 MMOL/L
HCO3 BLDA-SCNC: 22 MMOL/L (ref 22–26)
HCT VFR BLD AUTO: 27.4 % (ref 39–49)
HCT VFR BLD AUTO: 27.5 % (ref 39–49)
HCT VFR BLD CALC: 31 % (ref 40–54)
HGB BLD-MCNC: 8.9 G/DL (ref 13.7–17.3)
HGB BLD-MCNC: 9.1 G/DL (ref 13.7–17.3)
HGB BLDA-MCNC: 10.4 G/DL (ref 14–18)
MAGNESIUM SERPL-MCNC: 1.9 MG/DL (ref 1.6–2.3)
MAXIMAL PREDICTED HEART RATE: 160 BPM
MCH RBC QN AUTO: 28.1 PG (ref 26.5–34)
MCHC RBC AUTO-ENTMCNC: 32.4 G/DL (ref 31.5–36.3)
MCV RBC AUTO: 86.8 FL (ref 80–98)
MODALITY: ABNORMAL
PCO2 BLDA: 34.6 MM HG (ref 35–45)
PH BLDA: 7.42 PH UNITS (ref 7.35–7.45)
PLATELET # BLD AUTO: 30 10*3/MM3 (ref 150–450)
PMV BLD AUTO: ABNORMAL FL (ref 8–12)
PO2 BLDA: 102.1 MM HG (ref 80–105)
POTASSIUM BLD-SCNC: 3.4 MMOL/L (ref 3.5–5.1)
POTASSIUM BLD-SCNC: 4.1 MMOL/L (ref 3.5–5.1)
POTASSIUM BLDA-SCNC: 3.5 MMOL/L (ref 3.6–4.9)
PROT SERPL-MCNC: 5.8 G/DL (ref 6.3–8.6)
RBC # BLD AUTO: 3.17 10*6/MM3 (ref 4.37–5.74)
SAO2 % BLDCOA: 97.4 %
SMALL PLATELETS BLD QL SMEAR: ADEQUATE
SODIUM BLD-SCNC: 142 MMOL/L (ref 137–145)
SODIUM BLDA-SCNC: 138.4 MMOL/L (ref 138–146)
STRESS TARGET HR: 136 BPM
WBC MORPH BLD: NORMAL
WBC NRBC COR # BLD: 10.56 10*3/MM3 (ref 3.2–9.8)

## 2017-11-21 PROCEDURE — 25010000002 FUROSEMIDE PER 20 MG: Performed by: INTERNAL MEDICINE

## 2017-11-21 PROCEDURE — 25010000003 POTASSIUM CHLORIDE 10 MEQ/100ML SOLUTION: Performed by: INTERNAL MEDICINE

## 2017-11-21 PROCEDURE — 99223 1ST HOSP IP/OBS HIGH 75: CPT | Performed by: INTERNAL MEDICINE

## 2017-11-21 PROCEDURE — 25010000002 HEPARIN LOCK FLUSH 10 UNIT/ML SOLUTION: Performed by: INTERNAL MEDICINE

## 2017-11-21 PROCEDURE — 85027 COMPLETE CBC AUTOMATED: CPT | Performed by: INTERNAL MEDICINE

## 2017-11-21 PROCEDURE — 85007 BL SMEAR W/DIFF WBC COUNT: CPT | Performed by: INTERNAL MEDICINE

## 2017-11-21 PROCEDURE — 85014 HEMATOCRIT: CPT | Performed by: NURSE PRACTITIONER

## 2017-11-21 PROCEDURE — 84132 ASSAY OF SERUM POTASSIUM: CPT | Performed by: INTERNAL MEDICINE

## 2017-11-21 PROCEDURE — 25010000002 METHYLPREDNISOLONE PER 125 MG: Performed by: NURSE PRACTITIONER

## 2017-11-21 PROCEDURE — 80053 COMPREHEN METABOLIC PANEL: CPT | Performed by: INTERNAL MEDICINE

## 2017-11-21 PROCEDURE — 85018 HEMOGLOBIN: CPT | Performed by: NURSE PRACTITIONER

## 2017-11-21 PROCEDURE — 25010000002 CEFTRIAXONE PER 250 MG: Performed by: INTERNAL MEDICINE

## 2017-11-21 PROCEDURE — 83735 ASSAY OF MAGNESIUM: CPT | Performed by: INTERNAL MEDICINE

## 2017-11-21 PROCEDURE — 82803 BLOOD GASES ANY COMBINATION: CPT | Performed by: INTERNAL MEDICINE

## 2017-11-21 RX ORDER — POTASSIUM CHLORIDE 7.45 MG/ML
10 INJECTION INTRAVENOUS
Status: DISPENSED | OUTPATIENT
Start: 2017-11-21 | End: 2017-11-21

## 2017-11-21 RX ORDER — SODIUM CHLORIDE 9 MG/ML
INJECTION, SOLUTION INTRAVENOUS
Status: DISPENSED
Start: 2017-11-21 | End: 2017-11-22

## 2017-11-22 ENCOUNTER — OUTSIDE FACILITY SERVICE (OUTPATIENT)
Dept: ONCOLOGY | Facility: CLINIC | Age: 62
End: 2017-11-22

## 2017-11-22 LAB
ABO GROUP BLD: NORMAL
ANION GAP SERPL CALCULATED.3IONS-SCNC: 17 MMOL/L (ref 5–15)
ANISOCYTOSIS BLD QL: NORMAL
ARTERIAL PATENCY WRIST A: ABNORMAL
ATMOSPHERIC PRESS: ABNORMAL MMHG
BASE EXCESS BLDA CALC-SCNC: -6.4 MMOL/L (ref -2.4–2.4)
BASOPHILS # BLD AUTO: 0 10*3/MM3 (ref 0–0.2)
BASOPHILS NFR BLD AUTO: 0 % (ref 0–2)
BDY SITE: ABNORMAL
BLD GP AB SCN SERPL QL: NEGATIVE
BUN BLD-MCNC: 42 MG/DL (ref 7–21)
BUN/CREAT SERPL: 14.3 (ref 7–25)
CA-I BLD-MCNC: 4.7 MG/DL (ref 4.5–4.9)
CALCIUM SPEC-SCNC: 8.9 MG/DL (ref 8.4–10.2)
CHLORIDE SERPL-SCNC: 104 MMOL/L (ref 95–110)
CO2 BLDA-SCNC: 21.4 MMOL/L (ref 23–27)
CO2 SERPL-SCNC: 21 MMOL/L (ref 22–31)
CREAT BLD-MCNC: 2.94 MG/DL (ref 0.7–1.3)
DEPRECATED RDW RBC AUTO: 74.3 FL (ref 35.1–43.9)
DEPRECATED RDW RBC AUTO: 74.4 FL (ref 35.1–43.9)
EOSINOPHIL # BLD AUTO: 0 10*3/MM3 (ref 0–0.7)
EOSINOPHIL NFR BLD AUTO: 0 % (ref 0–7)
ERYTHROCYTE [DISTWIDTH] IN BLOOD BY AUTOMATED COUNT: 22.8 % (ref 11.5–14.5)
ERYTHROCYTE [DISTWIDTH] IN BLOOD BY AUTOMATED COUNT: 22.8 % (ref 11.5–14.5)
FERRITIN SERPL-MCNC: 206 NG/ML (ref 17.9–464)
FIBRINOGEN PPP-MCNC: 288 MG/DL (ref 228–514)
FOLATE SERPL-MCNC: 12.7 NG/ML (ref 2.76–21)
GFR SERPL CREATININE-BSD FRML MDRD: 22 ML/MIN/1.73 (ref 49–113)
GLUCOSE BLD-MCNC: 121 MG/DL (ref 60–100)
GLUCOSE BLDA-MCNC: 121 MMOL/L
HCO3 BLDA-SCNC: 20 MMOL/L (ref 22–26)
HCT VFR BLD AUTO: 27.9 % (ref 39–49)
HCT VFR BLD AUTO: 30.9 % (ref 39–49)
HCT VFR BLD AUTO: 31.9 % (ref 39–49)
HCT VFR BLD CALC: 32 % (ref 40–54)
HGB BLD-MCNC: 10.4 G/DL (ref 13.7–17.3)
HGB BLD-MCNC: 9 G/DL (ref 13.7–17.3)
HGB BLD-MCNC: 9.9 G/DL (ref 13.7–17.3)
HGB BLDA-MCNC: 10.8 G/DL (ref 14–18)
HYPOCHROMIA BLD QL: NORMAL
IMM GRANULOCYTES # BLD: 0.06 10*3/MM3 (ref 0–0.02)
IMM GRANULOCYTES NFR BLD: 0.4 % (ref 0–0.5)
INR PPP: 1.19 (ref 0.8–1.2)
IRON 24H UR-MRATE: 76 MCG/DL (ref 49–181)
IRON SATN MFR SERPL: 52 % (ref 20–55)
LYMPHOCYTES # BLD AUTO: 0.12 10*3/MM3 (ref 0.6–4.2)
LYMPHOCYTES NFR BLD AUTO: 0.8 % (ref 10–50)
Lab: NORMAL
MCH RBC QN AUTO: 29 PG (ref 26.5–34)
MCH RBC QN AUTO: 29.6 PG (ref 26.5–34)
MCHC RBC AUTO-ENTMCNC: 32.3 G/DL (ref 31.5–36.3)
MCHC RBC AUTO-ENTMCNC: 32.6 G/DL (ref 31.5–36.3)
MCV RBC AUTO: 90 FL (ref 80–98)
MCV RBC AUTO: 90.9 FL (ref 80–98)
MODALITY: ABNORMAL
MONOCYTES # BLD AUTO: 0.28 10*3/MM3 (ref 0–0.9)
MONOCYTES NFR BLD AUTO: 2 % (ref 0–12)
NEUTROPHILS # BLD AUTO: 13.75 10*3/MM3 (ref 2–8.6)
NEUTROPHILS NFR BLD AUTO: 96.8 % (ref 37–80)
PCO2 BLDA: 43.7 MM HG (ref 35–45)
PH BLDA: 7.28 PH UNITS (ref 7.35–7.45)
PLATELET # BLD AUTO: 33 10*3/MM3 (ref 150–450)
PLATELET # BLD AUTO: 75 10*3/MM3 (ref 150–450)
PMV BLD AUTO: 10.2 FL (ref 8–12)
PO2 BLDA: 66.5 MM HG (ref 80–105)
POIKILOCYTOSIS BLD QL SMEAR: NORMAL
POTASSIUM BLD-SCNC: 4.4 MMOL/L (ref 3.5–5.1)
POTASSIUM BLDA-SCNC: 4.28 MMOL/L (ref 3.6–4.9)
PROTHROMBIN TIME: 15.1 SECONDS (ref 11.1–15.3)
RBC # BLD AUTO: 3.1 10*6/MM3 (ref 4.37–5.74)
RBC # BLD AUTO: 3.51 10*6/MM3 (ref 4.37–5.74)
RH BLD: POSITIVE
SAO2 % BLDCOA: 89.9 %
SMALL PLATELETS BLD QL SMEAR: NORMAL
SODIUM BLD-SCNC: 142 MMOL/L (ref 137–145)
SODIUM BLDA-SCNC: 139.1 MMOL/L (ref 138–146)
TIBC SERPL-MCNC: 146 MCG/DL (ref 261–462)
VIT B12 BLD-MCNC: >1000 PG/ML (ref 239–931)
WBC MORPH BLD: NORMAL
WBC NRBC COR # BLD: 12.53 10*3/MM3 (ref 3.2–9.8)
WBC NRBC COR # BLD: 14.21 10*3/MM3 (ref 3.2–9.8)

## 2017-11-22 PROCEDURE — 25010000002 LORAZEPAM PER 2 MG: Performed by: NURSE PRACTITIONER

## 2017-11-22 PROCEDURE — 82746 ASSAY OF FOLIC ACID SERUM: CPT | Performed by: INTERNAL MEDICINE

## 2017-11-22 PROCEDURE — 25010000002 METHYLPREDNISOLONE PER 125 MG: Performed by: NURSE PRACTITIONER

## 2017-11-22 PROCEDURE — P9035 PLATELET PHERES LEUKOREDUCED: HCPCS

## 2017-11-22 PROCEDURE — 85610 PROTHROMBIN TIME: CPT | Performed by: INTERNAL MEDICINE

## 2017-11-22 PROCEDURE — 83550 IRON BINDING TEST: CPT | Performed by: INTERNAL MEDICINE

## 2017-11-22 PROCEDURE — 82728 ASSAY OF FERRITIN: CPT | Performed by: INTERNAL MEDICINE

## 2017-11-22 PROCEDURE — 82803 BLOOD GASES ANY COMBINATION: CPT | Performed by: INTERNAL MEDICINE

## 2017-11-22 PROCEDURE — 86901 BLOOD TYPING SEROLOGIC RH(D): CPT | Performed by: INTERNAL MEDICINE

## 2017-11-22 PROCEDURE — 85007 BL SMEAR W/DIFF WBC COUNT: CPT | Performed by: NURSE PRACTITIONER

## 2017-11-22 PROCEDURE — 83540 ASSAY OF IRON: CPT | Performed by: INTERNAL MEDICINE

## 2017-11-22 PROCEDURE — 63510000001 EPOETIN ALFA PER 1000 UNITS: Performed by: INTERNAL MEDICINE

## 2017-11-22 PROCEDURE — 25010000002 CEFTRIAXONE PER 250 MG: Performed by: INTERNAL MEDICINE

## 2017-11-22 PROCEDURE — 86850 RBC ANTIBODY SCREEN: CPT | Performed by: INTERNAL MEDICINE

## 2017-11-22 PROCEDURE — 86900 BLOOD TYPING SEROLOGIC ABO: CPT | Performed by: INTERNAL MEDICINE

## 2017-11-22 PROCEDURE — 80048 BASIC METABOLIC PNL TOTAL CA: CPT | Performed by: INTERNAL MEDICINE

## 2017-11-22 PROCEDURE — 85384 FIBRINOGEN ACTIVITY: CPT | Performed by: INTERNAL MEDICINE

## 2017-11-22 PROCEDURE — 85025 COMPLETE CBC W/AUTO DIFF WBC: CPT | Performed by: INTERNAL MEDICINE

## 2017-11-22 PROCEDURE — 99233 SBSQ HOSP IP/OBS HIGH 50: CPT | Performed by: INTERNAL MEDICINE

## 2017-11-22 PROCEDURE — 85027 COMPLETE CBC AUTOMATED: CPT | Performed by: NURSE PRACTITIONER

## 2017-11-22 PROCEDURE — 82607 VITAMIN B-12: CPT | Performed by: INTERNAL MEDICINE

## 2017-11-22 PROCEDURE — 25010000002 HEPARIN LOCK FLUSH 10 UNIT/ML SOLUTION: Performed by: INTERNAL MEDICINE

## 2017-11-22 RX ORDER — BUMETANIDE 0.25 MG/ML
2 INJECTION INTRAMUSCULAR; INTRAVENOUS ONCE
Status: DISCONTINUED | OUTPATIENT
Start: 2017-11-22 | End: 2017-11-23 | Stop reason: HOSPADM

## 2017-11-23 VITALS
RESPIRATION RATE: 18 BRPM | HEART RATE: 86 BPM | DIASTOLIC BLOOD PRESSURE: 94 MMHG | OXYGEN SATURATION: 100 % | SYSTOLIC BLOOD PRESSURE: 137 MMHG | TEMPERATURE: 98.4 F

## 2017-11-23 LAB
ABO + RH BLD: NORMAL
BH BB BLOOD EXPIRATION DATE: NORMAL
BH BB BLOOD TYPE BARCODE: 5100
BH BB DISPENSE STATUS: NORMAL
BH BB PRODUCT CODE: NORMAL
BH BB UNIT NUMBER: NORMAL
HCT VFR BLD AUTO: 29.3 % (ref 39–49)
HGB BLD-MCNC: 9.4 G/DL (ref 13.7–17.3)
UNIT  ABO: NORMAL
UNIT  RH: NORMAL

## 2017-11-23 PROCEDURE — 25010000002 LORAZEPAM PER 2 MG: Performed by: NURSE PRACTITIONER

## 2017-11-23 PROCEDURE — 25010000002 CEFTRIAXONE PER 250 MG: Performed by: INTERNAL MEDICINE

## 2017-11-23 PROCEDURE — 85014 HEMATOCRIT: CPT | Performed by: NURSE PRACTITIONER

## 2017-11-23 PROCEDURE — 85018 HEMOGLOBIN: CPT | Performed by: NURSE PRACTITIONER

## 2017-11-23 PROCEDURE — 25010000002 METHYLPREDNISOLONE PER 125 MG: Performed by: NURSE PRACTITIONER

## 2017-11-23 RX ORDER — LORAZEPAM 2 MG/ML
2 INJECTION INTRAMUSCULAR ONCE
Status: DISCONTINUED | OUTPATIENT
Start: 2017-11-23 | End: 2017-11-23 | Stop reason: HOSPADM

## 2017-11-26 LAB — FUNGUS WND CULT: NORMAL
